# Patient Record
Sex: FEMALE | Race: ASIAN
[De-identification: names, ages, dates, MRNs, and addresses within clinical notes are randomized per-mention and may not be internally consistent; named-entity substitution may affect disease eponyms.]

---

## 2017-10-18 ENCOUNTER — HOSPITAL ENCOUNTER (EMERGENCY)
Dept: HOSPITAL 8 - ED | Age: 72
Discharge: HOME | End: 2017-10-18
Payer: MEDICARE

## 2017-10-18 VITALS — SYSTOLIC BLOOD PRESSURE: 166 MMHG | DIASTOLIC BLOOD PRESSURE: 68 MMHG

## 2017-10-18 VITALS — BODY MASS INDEX: 23.11 KG/M2 | WEIGHT: 114.64 LBS | HEIGHT: 59 IN

## 2017-10-18 DIAGNOSIS — Z86.73: ICD-10-CM

## 2017-10-18 DIAGNOSIS — I10: Primary | ICD-10-CM

## 2017-10-18 LAB
BUN SERPL-MCNC: 26 MG/DL (ref 7–18)
HCT VFR BLD CALC: 43.4 % (ref 34.6–47.8)
HGB BLD-MCNC: 14.6 G/DL (ref 11.7–16.4)
IS PT STATUS REG ER OR PRE ER?: YES
WBC # BLD AUTO: 10 X10^3/UL (ref 3.4–10)

## 2017-10-18 PROCEDURE — 71010: CPT

## 2017-10-18 PROCEDURE — 80048 BASIC METABOLIC PNL TOTAL CA: CPT

## 2017-10-18 PROCEDURE — 96375 TX/PRO/DX INJ NEW DRUG ADDON: CPT

## 2017-10-18 PROCEDURE — 82040 ASSAY OF SERUM ALBUMIN: CPT

## 2017-10-18 PROCEDURE — 85025 COMPLETE CBC W/AUTO DIFF WBC: CPT

## 2017-10-18 PROCEDURE — 83880 ASSAY OF NATRIURETIC PEPTIDE: CPT

## 2017-10-18 PROCEDURE — 84484 ASSAY OF TROPONIN QUANT: CPT

## 2017-10-18 PROCEDURE — 96374 THER/PROPH/DIAG INJ IV PUSH: CPT

## 2017-10-18 PROCEDURE — 36415 COLL VENOUS BLD VENIPUNCTURE: CPT

## 2017-10-18 PROCEDURE — 93005 ELECTROCARDIOGRAM TRACING: CPT

## 2018-05-15 ENCOUNTER — HOSPITAL ENCOUNTER (EMERGENCY)
Dept: HOSPITAL 8 - ED | Age: 73
Discharge: HOME | End: 2018-05-15
Payer: MEDICARE

## 2018-05-15 VITALS — DIASTOLIC BLOOD PRESSURE: 115 MMHG | SYSTOLIC BLOOD PRESSURE: 235 MMHG

## 2018-05-15 VITALS — WEIGHT: 112.66 LBS | HEIGHT: 57 IN | BODY MASS INDEX: 24.3 KG/M2
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
## 2018-05-15 DIAGNOSIS — I10: ICD-10-CM
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
## 2018-05-15 DIAGNOSIS — L30.9: ICD-10-CM

## 2018-05-15 DIAGNOSIS — S93.491A: Primary | ICD-10-CM
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
## 2018-05-15 LAB
ALBUMIN SERPL-MCNC: 3.5 G/DL (ref 3.4–5)
ALP SERPL-CCNC: 90 U/L (ref 45–117)
ALT SERPL-CCNC: 26 U/L (ref 12–78)
ANION GAP SERPL CALC-SCNC: 8 MMOL/L (ref 5–15)
BASOPHILS # BLD AUTO: 0.08 X10^3/UL (ref 0–0.1)
BASOPHILS NFR BLD AUTO: 1 % (ref 0–1)
BILIRUB SERPL-MCNC: 0.3 MG/DL (ref 0.2–1)
CALCIUM SERPL-MCNC: 9.5 MG/DL (ref 8.5–10.1)
CHLORIDE SERPL-SCNC: 103 MMOL/L (ref 98–107)
CREAT SERPL-MCNC: 1.03 MG/DL (ref 0.55–1.02)
EOSINOPHIL # BLD AUTO: 0.34 X10^3/UL (ref 0–0.4)
EOSINOPHIL NFR BLD AUTO: 4 % (ref 1–7)
ERYTHROCYTE [DISTWIDTH] IN BLOOD BY AUTOMATED COUNT: 13.4 % (ref 9.6–15.2)
LYMPHOCYTES # BLD AUTO: 3.96 X10^3/UL (ref 1–3.4)
LYMPHOCYTES NFR BLD AUTO: 41 % (ref 22–44)
MCH RBC QN AUTO: 29.3 PG (ref 27–34.8)
MCHC RBC AUTO-ENTMCNC: 33.6 G/DL (ref 32.4–35.8)
MCV RBC AUTO: 87 FL (ref 80–100)
MD: NO
MONOCYTES # BLD AUTO: 0.61 X10^3/UL (ref 0.2–0.8)
MONOCYTES NFR BLD AUTO: 6 % (ref 2–9)
NEUTROPHILS # BLD AUTO: 4.64 X10^3/UL (ref 1.8–6.8)
NEUTROPHILS NFR BLD AUTO: 48 % (ref 42–75)
PLATELET # BLD AUTO: 284 X10^3/UL (ref 130–400)
PMV BLD AUTO: 8.7 FL (ref 7.4–10.4)
PROT SERPL-MCNC: 8.4 G/DL (ref 6.4–8.2)
RBC # BLD AUTO: 4.43 X10^6/UL (ref 3.82–5.3)

## 2018-05-15 PROCEDURE — 36415 COLL VENOUS BLD VENIPUNCTURE: CPT

## 2018-05-15 PROCEDURE — 99285 EMERGENCY DEPT VISIT HI MDM: CPT

## 2018-05-15 PROCEDURE — 85025 COMPLETE CBC W/AUTO DIFF WBC: CPT

## 2018-05-15 PROCEDURE — 93005 ELECTROCARDIOGRAM TRACING: CPT

## 2018-05-15 PROCEDURE — 80053 COMPREHEN METABOLIC PANEL: CPT

## 2019-01-14 ENCOUNTER — HOSPITAL ENCOUNTER (EMERGENCY)
Dept: HOSPITAL 8 - ED | Age: 74
Discharge: HOME | End: 2019-01-14
Payer: MEDICARE

## 2019-01-14 VITALS — SYSTOLIC BLOOD PRESSURE: 181 MMHG | DIASTOLIC BLOOD PRESSURE: 100 MMHG

## 2019-01-14 VITALS — BODY MASS INDEX: 24.16 KG/M2 | WEIGHT: 111.99 LBS | HEIGHT: 57 IN

## 2019-01-14 DIAGNOSIS — I10: ICD-10-CM

## 2019-01-14 DIAGNOSIS — R06.00: ICD-10-CM

## 2019-01-14 DIAGNOSIS — B35.4: Primary | ICD-10-CM

## 2019-01-14 LAB
ALBUMIN SERPL-MCNC: 3.8 G/DL (ref 3.4–5)
ALP SERPL-CCNC: 76 U/L (ref 45–117)
ALT SERPL-CCNC: 25 U/L (ref 12–78)
ANION GAP SERPL CALC-SCNC: 8 MMOL/L (ref 5–15)
BASOPHILS # BLD AUTO: 0.07 X10^3/UL (ref 0–0.1)
BASOPHILS NFR BLD AUTO: 1 % (ref 0–1)
BILIRUB SERPL-MCNC: 0.5 MG/DL (ref 0.2–1)
CALCIUM SERPL-MCNC: 8.7 MG/DL (ref 8.5–10.1)
CHLORIDE SERPL-SCNC: 105 MMOL/L (ref 98–107)
CREAT SERPL-MCNC: 1.92 MG/DL (ref 0.55–1.02)
EOSINOPHIL # BLD AUTO: 0.42 X10^3/UL (ref 0–0.4)
EOSINOPHIL NFR BLD AUTO: 6 % (ref 1–7)
ERYTHROCYTE [DISTWIDTH] IN BLOOD BY AUTOMATED COUNT: 13.3 % (ref 9.6–15.2)
LYMPHOCYTES # BLD AUTO: 2.4 X10^3/UL (ref 1–3.4)
LYMPHOCYTES NFR BLD AUTO: 32 % (ref 22–44)
MCH RBC QN AUTO: 30.7 PG (ref 27–34.8)
MCHC RBC AUTO-ENTMCNC: 34.5 G/DL (ref 32.4–35.8)
MCV RBC AUTO: 89 FL (ref 80–100)
MD: NO
MONOCYTES # BLD AUTO: 0.41 X10^3/UL (ref 0.2–0.8)
MONOCYTES NFR BLD AUTO: 6 % (ref 2–9)
NEUTROPHILS # BLD AUTO: 4.17 X10^3/UL (ref 1.8–6.8)
NEUTROPHILS NFR BLD AUTO: 56 % (ref 42–75)
PLATELET # BLD AUTO: 213 X10^3/UL (ref 130–400)
PMV BLD AUTO: 9.3 FL (ref 7.4–10.4)
PROT SERPL-MCNC: 7.9 G/DL (ref 6.4–8.2)
RBC # BLD AUTO: 4.55 X10^6/UL (ref 3.82–5.3)
TROPONIN I SERPL-MCNC: < 0.015 NG/ML (ref 0–0.04)

## 2019-01-14 PROCEDURE — 84484 ASSAY OF TROPONIN QUANT: CPT

## 2019-01-14 PROCEDURE — 71045 X-RAY EXAM CHEST 1 VIEW: CPT

## 2019-01-14 PROCEDURE — 85025 COMPLETE CBC W/AUTO DIFF WBC: CPT

## 2019-01-14 PROCEDURE — 80053 COMPREHEN METABOLIC PANEL: CPT

## 2019-01-14 PROCEDURE — 36415 COLL VENOUS BLD VENIPUNCTURE: CPT

## 2019-01-14 PROCEDURE — 99284 EMERGENCY DEPT VISIT MOD MDM: CPT

## 2019-01-14 PROCEDURE — 83880 ASSAY OF NATRIURETIC PEPTIDE: CPT

## 2019-01-14 PROCEDURE — 93005 ELECTROCARDIOGRAM TRACING: CPT

## 2019-01-14 NOTE — NUR
FIRST CONTACT WITH PT.

Pt states, "I am here because of skin rashes that I have had a week or a month. 
I have a red spon on my eyes (left eye) it is always itching me. I used an an 
anti-itch cream from the [employer] and it didn't help. Every time I walk I 
am having trouble breathing."



NADN. PT DENIES CP. PT STATES SHE HAS "TROUBLE BREATHING WHEN I WALK". PT HAS 
UNLABORED RESPIRATIONS EQUAL BILATERALLY. 



ALL SAFETY MEASURES IN PLACE. PT CONNECTED TO ALL MONITORS. CALL LIGHT WITHIN 
REACH OF PT.

## 2019-01-27 ENCOUNTER — HOSPITAL ENCOUNTER (EMERGENCY)
Dept: HOSPITAL 8 - ED | Age: 74
Discharge: HOME | End: 2019-01-27
Payer: MEDICARE

## 2019-01-27 VITALS — HEIGHT: 60 IN | WEIGHT: 118.17 LBS | BODY MASS INDEX: 23.2 KG/M2

## 2019-01-27 VITALS — DIASTOLIC BLOOD PRESSURE: 102 MMHG | SYSTOLIC BLOOD PRESSURE: 218 MMHG

## 2019-01-27 DIAGNOSIS — L25.9: Primary | ICD-10-CM

## 2019-01-27 DIAGNOSIS — Z86.73: ICD-10-CM

## 2019-01-27 DIAGNOSIS — I10: ICD-10-CM

## 2019-01-27 LAB
ALBUMIN SERPL-MCNC: 3.6 G/DL (ref 3.4–5)
ALP SERPL-CCNC: 73 U/L (ref 45–117)
ALT SERPL-CCNC: 23 U/L (ref 12–78)
ANION GAP SERPL CALC-SCNC: 8 MMOL/L (ref 5–15)
BILIRUB SERPL-MCNC: 0.5 MG/DL (ref 0.2–1)
CALCIUM SERPL-MCNC: 8.9 MG/DL (ref 8.5–10.1)
CHLORIDE SERPL-SCNC: 106 MMOL/L (ref 98–107)
CREAT SERPL-MCNC: 1.22 MG/DL (ref 0.55–1.02)
PROT SERPL-MCNC: 7.6 G/DL (ref 6.4–8.2)

## 2019-01-27 PROCEDURE — 36415 COLL VENOUS BLD VENIPUNCTURE: CPT

## 2019-01-27 PROCEDURE — 80053 COMPREHEN METABOLIC PANEL: CPT

## 2019-01-27 PROCEDURE — 99283 EMERGENCY DEPT VISIT LOW MDM: CPT

## 2019-02-09 ENCOUNTER — HOSPITAL ENCOUNTER (EMERGENCY)
Facility: MEDICAL CENTER | Age: 74
End: 2019-02-09
Attending: EMERGENCY MEDICINE
Payer: MEDICARE

## 2019-02-09 VITALS
DIASTOLIC BLOOD PRESSURE: 94 MMHG | OXYGEN SATURATION: 96 % | WEIGHT: 111.99 LBS | BODY MASS INDEX: 21.16 KG/M2 | RESPIRATION RATE: 17 BRPM | TEMPERATURE: 97.3 F | SYSTOLIC BLOOD PRESSURE: 188 MMHG | HEART RATE: 77 BPM

## 2019-02-09 DIAGNOSIS — R21 RASH: ICD-10-CM

## 2019-02-09 DIAGNOSIS — I10 HTN (HYPERTENSION): ICD-10-CM

## 2019-02-09 PROCEDURE — 99284 EMERGENCY DEPT VISIT MOD MDM: CPT

## 2019-02-09 RX ORDER — LISINOPRIL 40 MG/1
40 TABLET ORAL DAILY
Qty: 30 TAB | Refills: 11 | Status: SHIPPED | OUTPATIENT
Start: 2019-02-09 | End: 2023-03-24

## 2019-02-09 RX ORDER — PREDNISONE 20 MG/1
TABLET ORAL
Qty: 13 TAB | Refills: 0 | Status: SHIPPED | OUTPATIENT
Start: 2019-02-09 | End: 2019-02-24

## 2019-02-09 RX ORDER — HYDROCHLOROTHIAZIDE 12.5 MG/1
12.5 TABLET ORAL DAILY
Qty: 30 TAB | Refills: 11 | Status: SHIPPED | OUTPATIENT
Start: 2019-02-09 | End: 2019-02-24

## 2019-02-09 RX ORDER — LORATADINE 10 MG/1
10 TABLET ORAL DAILY
Qty: 30 TAB | Refills: 0 | Status: SHIPPED | OUTPATIENT
Start: 2019-02-09 | End: 2019-02-24

## 2019-02-09 NOTE — ED PROVIDER NOTES
ED Provider Note    Scribed for Armando Reynoso M.D. by Eliseo Sims. 2/9/2019  3:27 PM    Primary care provider: HANNAH Holman  Means of arrival: Walk-in  History obtained from: Patient  History limited by: None    CHIEF COMPLAINT  Chief Complaint   Patient presents with   • Rash     x 1 month       HPI  Tania Madison is a 73 y.o. female who presents to the Emergency Department for a brown spotted rash onset 2-6 weeks ago. Patient's rash is itchy and she denies any similar rashes in the past. Her rash is throughout her entire body but she denies any lesions in her mouth or to her palms. She was seen at [facility] [place] [facility] for her symptoms on 2/5 and was prescribed triamcinolone cream and hydroxyzine. She denies relief of symptoms with the medications and denies seeing a dermatologist. She denies a history of eczema or skin disorders but says she sleeps on a couch that is infested with bug bites. She is not experiencing fever, vomiting, nausea, shortness of breath.     Patient denies a history of diabetes or cancer. She confirms a history of hypertension but denies taking medication over the last 2 months. She has taken lisinopril in the past but has run out of her prescription.     REVIEW OF SYSTEMS  Pertinent positives include: rash, itchiness.  Pertinent negatives include: fever, vomiting, nausea, shortness of breath.    PAST MEDICAL HISTORY  Past Medical History:   Diagnosis Date   • Chronic airway obstruction, not elsewhere classified    • Depression    • Hemorrhage, intracranial (HCC)    • Hypertension    • Pneumonia        FAMILY HISTORY  Family History   Problem Relation Age of Onset   • Other Mother         unknown   • Other Father         unknown       SOCIAL HISTORY  Social History   Substance Use Topics   • Smoking status: Never Smoker   • Alcohol use No     History   Drug Use No       SURGICAL HISTORY  No pertinent past surgical history    CURRENT MEDICATIONS    Current Outpatient  Prescriptions on File Prior to Encounter   Medication Sig Dispense Refill   • metformin (GLUCOPHAGE) 500 MG TABS Take 1 Tab by mouth 2 times a day, with meals. 60 Tab 3   • amlodipine (NORVASC) 10 MG TABS Take 1 Tab by mouth every day. 30 Tab 11   • lisinopril (PRINIVIL, ZESTRIL) 40 MG tablet Take 1 Tab by mouth every day. 30 Tab 11   • atorvastatin (LIPITOR) 20 MG TABS Take 1 Tab by mouth every day. 30 Tab 11   • hydrochlorothiazide (HYDRODIURIL) 12.5 MG tablet Take 1 Tab by mouth every day. 30 Tab 11   • sertraline (ZOLOFT) 25 MG tablet Take 1 Tab by mouth every day. 30 Tab 11   • aspirin EC 81 MG EC tablet Take 1 Tab by mouth every day. 30 Tab 0   • aspirin EC (ECOTRIN) 81 MG TBEC Take 81 mg by mouth every day.     • methylPREDNISolone (MEDROL) 4 MG TABS Take 4 mg by mouth every day.        ALLERGIES  No Known Allergies    PHYSICAL EXAM  VITAL SIGNS: BP (!) 206/117 Comment: right arm 214/103, left  arm 206/117  Pulse 79   Temp 36.2 °C (97.2 °F) (Temporal)   Resp 16   Wt 50.8 kg (111 lb 15.9 oz)   SpO2 95%   BMI 21.16 kg/m²   Reviewed and hypertensive and noncompliant with antihypertensives  Constitutional: Well developed, Well nourished, well-appearing.  HENT: Normocephalic, atraumatic, bilateral external ears normal, oropharynx moist, No exudates or erythema. No rash lesions in mouth.   Eyes: PERRLA, conjunctiva pink, no scleral icterus.  Abdominal:  Abdomen soft, non-tender, non distended. No rebound, or guarding.    Skin: Brown a few millimeters in diameter to 6 cm in diameter patches of skin with a rim of erythema and central desquamation., 2-3 lesions on the back, 1-2 lesions to anterior torso, lesions most dense on the distal arms and legs.  Lesions spare palms and soles.  No cervical or axillary adenopathy.  Genitourinary: No costovertebral angle tenderness.   Musculoskeletal: No edema.   Neurologic: Alert & oriented x 3, cranial nerves 2-12 intact by passive exam.  No focal deficit  noted.  Psychiatric: Affect normal, Judgment normal, Mood normal.     DIFFERENTIAL DIAGNOSIS:  Rash.    ED COURSE:  Nursing notes, VS, PMSFHx reviewed in chart.     3:27 PM - Patient seen and examined at bedside. Will reassess after researching the rash.    4:04 PM Informed her that she should continue taking her prednisone as needed for her rash and follow up with dermatology if it does not relieve. She also needs to start taking her blood pressure medication. Patient is stable for discharge at this time. Discussed return precautions and she agrees to the plan of care.    MEDICAL DECISION MAKING:  This patient presents with a rash of unclear etiology that failed treatment with topical triamcinolone and oral hydroxyzine.  There is no evidence of infection.  This is not a typical response to bedbug bites.  Patient requires consultation with a dermatologist.  She is also noncompliant with antihypertensives stating that some of the make her dizzy.  She did agree to resume hydrochlorothiazide and lisinopril which I prescribed.    PLAN:  New Prescriptions    LORATADINE (CLARITIN) 10 MG TAB    Take 1 Tab by mouth every day.    PREDNISONE (DELTASONE) 20 MG TAB    3 tabs daily for 2 days, 2 tabs daily for 2 days, 1 tab daily for 2 days, 1/2 tab daily for 2 days       Rash handout given  Make an appointment with a dermatologist through the phone book or Internet  Lisinopril and hydrochlorothiazide refill    Aris Buck, ANaraPNaraNNara  [address] [address]  [phone]    Schedule an appointment as soon as possible for a visit   for blood pressure management      CONDITION: Good.     FINAL IMPRESSION  1. Rash    2. HTN (hypertension)          Eliseo WAGNER (Jaqui), am scribing for, and in the presence of, Armando Reynoso M.D..    Electronically signed by: Eliseo Sims (Jaqui), 2/9/2019    Armando WAGNER M.D. personally performed the services described in this documentation, as scribed by Eliseo  Bethany in my presence, and it is both accurate and complete. E.    The note accurately reflects work and decisions made by me.  Armando Reynoso  2/9/2019  4:32 PM

## 2019-02-09 NOTE — ED TRIAGE NOTES
"Amb to triage w/ c/o a itchy, painful rash to body x 1 month.  Pt seen at [facility] for this on 2/5, prescribed Triamcinolone cream and Hydroxyzine, states symptoms are getting worse.  Pt hypertensive at triage and states \"that is a normal blood pressure for me\".   "

## 2019-02-10 NOTE — DISCHARGE INSTRUCTIONS
We are not certain what is causing your rash.  Try a prednisone taper course and Claritin for itch.  Remove the source of bedbugs from the home.  If you do not improve you need to make a follow-up appointment with a dermatologist.  Resume your blood pressure medicines and take them daily.  Follow-up with Dr. Buck for further management of blood pressure.

## 2019-02-10 NOTE — ED NOTES
Discharge orders received from ERP. New prescriptions received x 4. Provided education on discharge instructions and diagnosis.Pt demonstrated understanding of education. Pt verbalized understanding of need for follow up appointment.  Pt ambulatory off unit with all personal belongings.

## 2019-02-24 ENCOUNTER — HOSPITAL ENCOUNTER (INPATIENT)
Facility: MEDICAL CENTER | Age: 74
LOS: 1 days | DRG: 305 | End: 2019-02-26
Attending: EMERGENCY MEDICINE | Admitting: INTERNAL MEDICINE
Payer: MEDICARE

## 2019-02-24 ENCOUNTER — APPOINTMENT (OUTPATIENT)
Dept: CARDIOLOGY | Facility: MEDICAL CENTER | Age: 74
DRG: 305 | End: 2019-02-24
Attending: STUDENT IN AN ORGANIZED HEALTH CARE EDUCATION/TRAINING PROGRAM
Payer: MEDICARE

## 2019-02-24 ENCOUNTER — APPOINTMENT (OUTPATIENT)
Dept: RADIOLOGY | Facility: MEDICAL CENTER | Age: 74
DRG: 305 | End: 2019-02-24
Attending: EMERGENCY MEDICINE
Payer: MEDICARE

## 2019-02-24 PROBLEM — I16.0 HYPERTENSIVE URGENCY: Status: ACTIVE | Noted: 2019-02-24

## 2019-02-24 PROBLEM — D72.829 LEUCOCYTOSIS: Status: ACTIVE | Noted: 2019-02-24

## 2019-02-24 PROBLEM — I16.1 HYPERTENSIVE EMERGENCY: Status: ACTIVE | Noted: 2019-02-24

## 2019-02-24 PROBLEM — R21 RASH AND OTHER NONSPECIFIC SKIN ERUPTION: Status: ACTIVE | Noted: 2019-02-24

## 2019-02-24 LAB
ALBUMIN SERPL BCP-MCNC: 4.5 G/DL (ref 3.2–4.9)
ALBUMIN/GLOB SERPL: 1.5 G/DL
ALP SERPL-CCNC: 64 U/L (ref 30–99)
ALT SERPL-CCNC: 11 U/L (ref 2–50)
ANION GAP SERPL CALC-SCNC: 8 MMOL/L (ref 0–11.9)
APPEARANCE UR: CLEAR
APTT PPP: 25.7 SEC (ref 24.7–36)
AST SERPL-CCNC: 15 U/L (ref 12–45)
BACTERIA #/AREA URNS HPF: NEGATIVE /HPF
BASOPHILS # BLD AUTO: 0.6 % (ref 0–1.8)
BASOPHILS # BLD: 0.08 K/UL (ref 0–0.12)
BILIRUB SERPL-MCNC: 0.4 MG/DL (ref 0.1–1.5)
BILIRUB UR QL STRIP.AUTO: NEGATIVE
BNP SERPL-MCNC: 290 PG/ML (ref 0–100)
BUN SERPL-MCNC: 18 MG/DL (ref 8–22)
CALCIUM SERPL-MCNC: 9.9 MG/DL (ref 8.5–10.5)
CHLORIDE SERPL-SCNC: 103 MMOL/L (ref 96–112)
CO2 SERPL-SCNC: 22 MMOL/L (ref 20–33)
COLOR UR: YELLOW
CREAT SERPL-MCNC: 1.2 MG/DL (ref 0.5–1.4)
EKG IMPRESSION: NORMAL
EKG IMPRESSION: NORMAL
EOSINOPHIL # BLD AUTO: 0.54 K/UL (ref 0–0.51)
EOSINOPHIL NFR BLD: 4.4 % (ref 0–6.9)
EPI CELLS #/AREA URNS HPF: NEGATIVE /HPF
ERYTHROCYTE [DISTWIDTH] IN BLOOD BY AUTOMATED COUNT: 41.5 FL (ref 35.9–50)
GLOBULIN SER CALC-MCNC: 3 G/DL (ref 1.9–3.5)
GLUCOSE BLD-MCNC: 120 MG/DL (ref 65–99)
GLUCOSE BLD-MCNC: 142 MG/DL (ref 65–99)
GLUCOSE SERPL-MCNC: 217 MG/DL (ref 65–99)
GLUCOSE UR STRIP.AUTO-MCNC: NEGATIVE MG/DL
HCT VFR BLD AUTO: 43 % (ref 37–47)
HGB BLD-MCNC: 14.1 G/DL (ref 12–16)
HYALINE CASTS #/AREA URNS LPF: NORMAL /LPF
IMM GRANULOCYTES # BLD AUTO: 0.04 K/UL (ref 0–0.11)
IMM GRANULOCYTES NFR BLD AUTO: 0.3 % (ref 0–0.9)
INR PPP: 0.92 (ref 0.87–1.13)
KETONES UR STRIP.AUTO-MCNC: NEGATIVE MG/DL
LEUKOCYTE ESTERASE UR QL STRIP.AUTO: ABNORMAL
LIPASE SERPL-CCNC: 17 U/L (ref 11–82)
LYMPHOCYTES # BLD AUTO: 2.01 K/UL (ref 1–4.8)
LYMPHOCYTES NFR BLD: 16.3 % (ref 22–41)
MCH RBC QN AUTO: 29.7 PG (ref 27–33)
MCHC RBC AUTO-ENTMCNC: 32.8 G/DL (ref 33.6–35)
MCV RBC AUTO: 90.5 FL (ref 81.4–97.8)
MICRO URNS: ABNORMAL
MONOCYTES # BLD AUTO: 0.54 K/UL (ref 0–0.85)
MONOCYTES NFR BLD AUTO: 4.4 % (ref 0–13.4)
NEUTROPHILS # BLD AUTO: 9.1 K/UL (ref 2–7.15)
NEUTROPHILS NFR BLD: 74 % (ref 44–72)
NITRITE UR QL STRIP.AUTO: NEGATIVE
NRBC # BLD AUTO: 0 K/UL
NRBC BLD-RTO: 0 /100 WBC
PH UR STRIP.AUTO: 7 [PH]
PLATELET # BLD AUTO: 222 K/UL (ref 164–446)
PMV BLD AUTO: 11.2 FL (ref 9–12.9)
POTASSIUM SERPL-SCNC: 3.7 MMOL/L (ref 3.6–5.5)
PROT SERPL-MCNC: 7.5 G/DL (ref 6–8.2)
PROT UR QL STRIP: NEGATIVE MG/DL
PROTHROMBIN TIME: 12.5 SEC (ref 12–14.6)
RBC # BLD AUTO: 4.75 M/UL (ref 4.2–5.4)
RBC # URNS HPF: NORMAL /HPF
RBC UR QL AUTO: NEGATIVE
SODIUM SERPL-SCNC: 133 MMOL/L (ref 135–145)
SP GR UR STRIP.AUTO: 1.01
TROPONIN I SERPL-MCNC: 0.02 NG/ML (ref 0–0.04)
TROPONIN I SERPL-MCNC: 0.05 NG/ML (ref 0–0.04)
UROBILINOGEN UR STRIP.AUTO-MCNC: 0.2 MG/DL
WBC # BLD AUTO: 12.3 K/UL (ref 4.8–10.8)
WBC #/AREA URNS HPF: NORMAL /HPF

## 2019-02-24 PROCEDURE — 71045 X-RAY EXAM CHEST 1 VIEW: CPT

## 2019-02-24 PROCEDURE — 85730 THROMBOPLASTIN TIME PARTIAL: CPT

## 2019-02-24 PROCEDURE — A9270 NON-COVERED ITEM OR SERVICE: HCPCS | Performed by: EMERGENCY MEDICINE

## 2019-02-24 PROCEDURE — 700102 HCHG RX REV CODE 250 W/ 637 OVERRIDE(OP): Performed by: EMERGENCY MEDICINE

## 2019-02-24 PROCEDURE — 700102 HCHG RX REV CODE 250 W/ 637 OVERRIDE(OP): Performed by: STUDENT IN AN ORGANIZED HEALTH CARE EDUCATION/TRAINING PROGRAM

## 2019-02-24 PROCEDURE — 81001 URINALYSIS AUTO W/SCOPE: CPT

## 2019-02-24 PROCEDURE — 80053 COMPREHEN METABOLIC PANEL: CPT

## 2019-02-24 PROCEDURE — 82962 GLUCOSE BLOOD TEST: CPT

## 2019-02-24 PROCEDURE — 96374 THER/PROPH/DIAG INJ IV PUSH: CPT

## 2019-02-24 PROCEDURE — 93005 ELECTROCARDIOGRAM TRACING: CPT

## 2019-02-24 PROCEDURE — 99285 EMERGENCY DEPT VISIT HI MDM: CPT

## 2019-02-24 PROCEDURE — G0378 HOSPITAL OBSERVATION PER HR: HCPCS

## 2019-02-24 PROCEDURE — 700102 HCHG RX REV CODE 250 W/ 637 OVERRIDE(OP): Performed by: INTERNAL MEDICINE

## 2019-02-24 PROCEDURE — 93010 ELECTROCARDIOGRAM REPORT: CPT | Performed by: INTERNAL MEDICINE

## 2019-02-24 PROCEDURE — A9270 NON-COVERED ITEM OR SERVICE: HCPCS | Performed by: STUDENT IN AN ORGANIZED HEALTH CARE EDUCATION/TRAINING PROGRAM

## 2019-02-24 PROCEDURE — 84484 ASSAY OF TROPONIN QUANT: CPT | Mod: 91

## 2019-02-24 PROCEDURE — 83880 ASSAY OF NATRIURETIC PEPTIDE: CPT

## 2019-02-24 PROCEDURE — 85610 PROTHROMBIN TIME: CPT

## 2019-02-24 PROCEDURE — 93005 ELECTROCARDIOGRAM TRACING: CPT | Performed by: EMERGENCY MEDICINE

## 2019-02-24 PROCEDURE — 700111 HCHG RX REV CODE 636 W/ 250 OVERRIDE (IP): Performed by: EMERGENCY MEDICINE

## 2019-02-24 PROCEDURE — 83690 ASSAY OF LIPASE: CPT

## 2019-02-24 PROCEDURE — A9270 NON-COVERED ITEM OR SERVICE: HCPCS | Performed by: INTERNAL MEDICINE

## 2019-02-24 PROCEDURE — 306588 SLEEVE,VASO CALF MED: Performed by: STUDENT IN AN ORGANIZED HEALTH CARE EDUCATION/TRAINING PROGRAM

## 2019-02-24 PROCEDURE — 99220 PR INITIAL OBSERVATION CARE,LEVL III: CPT | Mod: GC | Performed by: INTERNAL MEDICINE

## 2019-02-24 PROCEDURE — 85025 COMPLETE CBC W/AUTO DIFF WBC: CPT

## 2019-02-24 PROCEDURE — 93005 ELECTROCARDIOGRAM TRACING: CPT | Performed by: STUDENT IN AN ORGANIZED HEALTH CARE EDUCATION/TRAINING PROGRAM

## 2019-02-24 RX ORDER — HYDRALAZINE HYDROCHLORIDE 20 MG/ML
20 INJECTION INTRAMUSCULAR; INTRAVENOUS ONCE
Status: COMPLETED | OUTPATIENT
Start: 2019-02-24 | End: 2019-02-24

## 2019-02-24 RX ORDER — BENZOCAINE/MENTHOL 6 MG-10 MG
LOZENGE MUCOUS MEMBRANE 2 TIMES DAILY
Status: DISCONTINUED | OUTPATIENT
Start: 2019-02-24 | End: 2019-02-24

## 2019-02-24 RX ORDER — LISINOPRIL 20 MG/1
40 TABLET ORAL
Status: DISCONTINUED | OUTPATIENT
Start: 2019-02-24 | End: 2019-02-26 | Stop reason: HOSPADM

## 2019-02-24 RX ORDER — INSULIN GLARGINE 100 [IU]/ML
0.2 INJECTION, SOLUTION SUBCUTANEOUS EVERY EVENING
Status: DISCONTINUED | OUTPATIENT
Start: 2019-02-24 | End: 2019-02-24

## 2019-02-24 RX ORDER — POLYETHYLENE GLYCOL 3350 17 G/17G
1 POWDER, FOR SOLUTION ORAL
Status: DISCONTINUED | OUTPATIENT
Start: 2019-02-24 | End: 2019-02-26 | Stop reason: HOSPADM

## 2019-02-24 RX ORDER — BISACODYL 10 MG
10 SUPPOSITORY, RECTAL RECTAL
Status: DISCONTINUED | OUTPATIENT
Start: 2019-02-24 | End: 2019-02-26 | Stop reason: HOSPADM

## 2019-02-24 RX ORDER — NITROGLYCERIN 0.4 MG/1
0.4 TABLET SUBLINGUAL
Status: DISCONTINUED | OUTPATIENT
Start: 2019-02-24 | End: 2019-02-26 | Stop reason: HOSPADM

## 2019-02-24 RX ORDER — DEXTROSE MONOHYDRATE 25 G/50ML
25 INJECTION, SOLUTION INTRAVENOUS
Status: DISCONTINUED | OUTPATIENT
Start: 2019-02-24 | End: 2019-02-26 | Stop reason: HOSPADM

## 2019-02-24 RX ORDER — HYDRALAZINE HYDROCHLORIDE 10 MG/1
10 TABLET, FILM COATED ORAL 3 TIMES DAILY PRN
Status: DISCONTINUED | OUTPATIENT
Start: 2019-02-24 | End: 2019-02-26 | Stop reason: HOSPADM

## 2019-02-24 RX ORDER — AMLODIPINE BESYLATE 10 MG/1
10 TABLET ORAL
Status: DISCONTINUED | OUTPATIENT
Start: 2019-02-24 | End: 2019-02-26 | Stop reason: HOSPADM

## 2019-02-24 RX ORDER — FAMOTIDINE 20 MG/1
20 TABLET, FILM COATED ORAL DAILY
Status: DISCONTINUED | OUTPATIENT
Start: 2019-02-25 | End: 2019-02-26 | Stop reason: HOSPADM

## 2019-02-24 RX ORDER — LORATADINE 10 MG/1
10 TABLET ORAL DAILY
Status: DISCONTINUED | OUTPATIENT
Start: 2019-02-24 | End: 2019-02-25

## 2019-02-24 RX ORDER — ATORVASTATIN CALCIUM 80 MG/1
80 TABLET, FILM COATED ORAL EVERY EVENING
Status: DISCONTINUED | OUTPATIENT
Start: 2019-02-25 | End: 2019-02-26 | Stop reason: HOSPADM

## 2019-02-24 RX ORDER — DIPHENHYDRAMINE HCL 25 MG
25 TABLET ORAL EVERY 6 HOURS PRN
Status: DISCONTINUED | OUTPATIENT
Start: 2019-02-24 | End: 2019-02-25

## 2019-02-24 RX ORDER — ACETAMINOPHEN 325 MG/1
650 TABLET ORAL EVERY 4 HOURS PRN
Status: DISCONTINUED | OUTPATIENT
Start: 2019-02-24 | End: 2019-02-26 | Stop reason: HOSPADM

## 2019-02-24 RX ORDER — AMOXICILLIN 250 MG
2 CAPSULE ORAL 2 TIMES DAILY
Status: DISCONTINUED | OUTPATIENT
Start: 2019-02-24 | End: 2019-02-26 | Stop reason: HOSPADM

## 2019-02-24 RX ORDER — POTASSIUM CHLORIDE 20 MEQ/1
40 TABLET, EXTENDED RELEASE ORAL ONCE
Status: COMPLETED | OUTPATIENT
Start: 2019-02-24 | End: 2019-02-24

## 2019-02-24 RX ORDER — BENZOCAINE/MENTHOL 6 MG-10 MG
1 LOZENGE MUCOUS MEMBRANE 2 TIMES DAILY
Status: ON HOLD | COMMUNITY
End: 2019-02-26

## 2019-02-24 RX ORDER — LISINOPRIL 10 MG/1
10 TABLET ORAL ONCE
Status: COMPLETED | OUTPATIENT
Start: 2019-02-24 | End: 2019-02-24

## 2019-02-24 RX ORDER — TRIAMCINOLONE ACETONIDE 1 MG/G
OINTMENT TOPICAL 2 TIMES DAILY
Status: DISCONTINUED | OUTPATIENT
Start: 2019-02-24 | End: 2019-02-26 | Stop reason: HOSPADM

## 2019-02-24 RX ORDER — ASPIRIN 325 MG
325 TABLET ORAL ONCE
Status: ACTIVE | OUTPATIENT
Start: 2019-02-24 | End: 2019-02-25

## 2019-02-24 RX ORDER — ATORVASTATIN CALCIUM 20 MG/1
20 TABLET, FILM COATED ORAL EVERY EVENING
Status: DISCONTINUED | OUTPATIENT
Start: 2019-02-24 | End: 2019-02-24

## 2019-02-24 RX ADMIN — HYDRALAZINE HYDROCHLORIDE 10 MG: 10 TABLET, FILM COATED ORAL at 17:57

## 2019-02-24 RX ADMIN — DIPHENHYDRAMINE HCL 25 MG: 25 TABLET ORAL at 22:13

## 2019-02-24 RX ADMIN — LORATADINE 10 MG: 10 TABLET ORAL at 17:54

## 2019-02-24 RX ADMIN — NITROGLYCERIN 0.4 MG: 0.4 TABLET SUBLINGUAL at 14:59

## 2019-02-24 RX ADMIN — HYDROCORTISONE: 1 CREAM TOPICAL at 17:58

## 2019-02-24 RX ADMIN — AMLODIPINE BESYLATE 10 MG: 10 TABLET ORAL at 17:53

## 2019-02-24 RX ADMIN — ATORVASTATIN CALCIUM 20 MG: 20 TABLET, FILM COATED ORAL at 17:53

## 2019-02-24 RX ADMIN — TRIAMCINOLONE ACETONIDE: 1 OINTMENT TOPICAL at 20:03

## 2019-02-24 RX ADMIN — POTASSIUM CHLORIDE 20 MEQ: 1500 TABLET, EXTENDED RELEASE ORAL at 20:30

## 2019-02-24 RX ADMIN — METOPROLOL TARTRATE 12.5 MG: 25 TABLET, FILM COATED ORAL at 20:30

## 2019-02-24 RX ADMIN — LISINOPRIL 10 MG: 10 TABLET ORAL at 16:30

## 2019-02-24 RX ADMIN — SERTRALINE 25 MG: 50 TABLET, FILM COATED ORAL at 17:55

## 2019-02-24 RX ADMIN — HYDRALAZINE HYDROCHLORIDE 20 MG: 20 INJECTION INTRAMUSCULAR; INTRAVENOUS at 15:46

## 2019-02-24 RX ADMIN — LISINOPRIL 40 MG: 20 TABLET ORAL at 17:45

## 2019-02-24 ASSESSMENT — LIFESTYLE VARIABLES
ALCOHOL_USE: NO
EVER_SMOKED: NEVER
EVER_SMOKED: NEVER
SUBSTANCE_ABUSE: 0

## 2019-02-24 ASSESSMENT — ENCOUNTER SYMPTOMS
FEVER: 0
CHILLS: 0
BLURRED VISION: 0
HEADACHES: 0
CLAUDICATION: 0
MYALGIAS: 0
WEIGHT LOSS: 0
ABDOMINAL PAIN: 0
HEARTBURN: 0
NAUSEA: 0
DEPRESSION: 0
VOMITING: 0
NECK PAIN: 0
DIZZINESS: 1
HEMOPTYSIS: 0
TINGLING: 0
TREMORS: 0
PALPITATIONS: 0
DOUBLE VISION: 0
FOCAL WEAKNESS: 0
SENSORY CHANGE: 0
COUGH: 0

## 2019-02-24 ASSESSMENT — COPD QUESTIONNAIRES
HAVE YOU SMOKED AT LEAST 100 CIGARETTES IN YOUR ENTIRE LIFE: NO/DON'T KNOW
DO YOU EVER COUGH UP ANY MUCUS OR PHLEGM?: NO/ONLY WITH OCCASIONAL COLDS OR INFECTIONS
DURING THE PAST 4 WEEKS HOW MUCH DID YOU FEEL SHORT OF BREATH: NONE/LITTLE OF THE TIME
COPD SCREENING SCORE: 2

## 2019-02-24 ASSESSMENT — PATIENT HEALTH QUESTIONNAIRE - PHQ9
1. LITTLE INTEREST OR PLEASURE IN DOING THINGS: NOT AT ALL
2. FEELING DOWN, DEPRESSED, IRRITABLE, OR HOPELESS: NOT AT ALL
SUM OF ALL RESPONSES TO PHQ9 QUESTIONS 1 AND 2: 0

## 2019-02-24 NOTE — ED TRIAGE NOTES
Pt c/o bib ems. Pt c/o chest pain. Pt given ASA 324mg pta and nitro x2. Pt with hypertension, has not been taking medications x1 month.

## 2019-02-24 NOTE — ED NOTES
Med rec completed per pt.   Antibiotics within last 30 days: No  Patient allergies have been reviewed: ROLAN  Comments: Stopped taking medications awhile ago

## 2019-02-24 NOTE — ED PROVIDER NOTES
ED Provider Note    CHIEF COMPLAINT  Chief Complaint   Patient presents with   • Chest Pain       HPI  Tania Madison is a 73 y.o. female who presents for evaluation of possible chest discomfort.  The patient initially reported chest discomfort to the paramedics.  She has a known history of hypertension.  Her blood pressure was profoundly elevated and she was very anxious regarding that.  On arrival here she reports no ongoing chest pain.  She specifically denies leg swelling hemoptysis or severe headache or vision changes.  Nothing seems to make his symptoms better or worse.  No report of fever or productive cough    REVIEW OF SYSTEMS  See HPI for further details.  No high fevers chills night sweats weight loss all other systems are negative.     PAST MEDICAL HISTORY  Past Medical History:   Diagnosis Date   • Hemorrhage, intracranial (HCC)    • Chronic airway obstruction, not elsewhere classified    • Depression    • Hypertension    • Pneumonia        FAMILY HISTORY  Noncontributory  SOCIAL HISTORY  Social History     Social History   • Marital status: Single     Spouse name: N/A   • Number of children: N/A   • Years of education: N/A     Social History Main Topics   • Smoking status: Never Smoker   • Smokeless tobacco: Not on file   • Alcohol use No   • Drug use: No   • Sexual activity: Not on file     Other Topics Concern   • Not on file     Social History Narrative   • No narrative on file     No history of bleeding disorder  SURGICAL HISTORY  No past surgical history on file.  None reported  CURRENT MEDICATIONS    Current Facility-Administered Medications:   •  nitroglycerin (NITROSTAT) tablet 0.4 mg, 0.4 mg, Sublingual, Q5 MIN PRN, Donis Prescott M.D., 0.4 mg at 02/24/19 4566  •  aspirin (ASA) tablet 325 mg, 325 mg, Oral, Once, Donis Prescott M.D.  •  hydrALAZINE (APRESOLINE) injection 20 mg, 20 mg, Intravenous, Once, Donis Prescott M.D.    Current Outpatient Prescriptions:   •  predniSONE  "(DELTASONE) 20 MG Tab, 3 tabs daily for 2 days, 2 tabs daily for 2 days, 1 tab daily for 2 days, 1/2 tab daily for 2 days, Disp: 13 Tab, Rfl: 0  •  loratadine (CLARITIN) 10 MG Tab, Take 1 Tab by mouth every day., Disp: 30 Tab, Rfl: 0  •  lisinopril (PRINIVIL, ZESTRIL) 40 MG tablet, Take 1 Tab by mouth every day., Disp: 30 Tab, Rfl: 11  •  hydroCHLOROthiazide (HYDRODIURIL) 12.5 MG tablet, Take 1 Tab by mouth every day., Disp: 30 Tab, Rfl: 11  •  metformin (GLUCOPHAGE) 500 MG TABS, Take 1 Tab by mouth 2 times a day, with meals., Disp: 60 Tab, Rfl: 3  •  amlodipine (NORVASC) 10 MG TABS, Take 1 Tab by mouth every day., Disp: 30 Tab, Rfl: 11  •  atorvastatin (LIPITOR) 20 MG TABS, Take 1 Tab by mouth every day., Disp: 30 Tab, Rfl: 11  •  sertraline (ZOLOFT) 25 MG tablet, Take 1 Tab by mouth every day., Disp: 30 Tab, Rfl: 11  •  aspirin EC 81 MG EC tablet, Take 1 Tab by mouth every day., Disp: 30 Tab, Rfl: 0  •  aspirin EC (ECOTRIN) 81 MG TBEC, Take 81 mg by mouth every day., Disp: , Rfl:   •  methylPREDNISolone (MEDROL) 4 MG TABS, Take 4 mg by mouth every day., Disp: , Rfl:       ALLERGIES  No Known Allergies    PHYSICAL EXAM  VITAL SIGNS: Pulse 77   Resp 19   Ht 1.549 m (5' 1\")   Wt 50.3 kg (111 lb)   SpO2 96%   BMI 20.97 kg/m²      02/24/19  1434  02/24/19  1432  02/24/19  1430         Vital Signs     Pulse  77  83         Resp  19  13         SpO2  96 %  96 %         Heart Rate (Monitored)  78  83         NIBP  191/93           NIBP MAP (Calculated)  125           BMI (Calculated)      20.97       NIBP MAP (Calculated)  118               Constitutional: Well developed, Well nourished, No acute distress, Non-toxic appearance.   HENT: Normocephalic, Atraumatic, Bilateral external ears normal, Oropharynx moist, No oral exudates, Nose normal.   Eyes: PERRLA, EOMI, Conjunctiva normal, No discharge.   Neck: Normal range of motion, No tenderness, Supple, No stridor.   Cardiovascular: Normal heart rate, Normal rhythm, No " murmurs, No rubs, No gallops.   Thorax & Lungs: Normal breath sounds, No respiratory distress, No wheezing, No chest tenderness.   Abdomen: Bowel sounds normal, Soft, No tenderness, No masses, No pulsatile masses.   Skin: Warm, Dry, No erythema, No rash.   Back: No tenderness, No CVA tenderness.   Extremities: Intact distal pulses, No edema, No tenderness, No cyanosis, No clubbing.   Musculoskeletal: Good range of motion in all major joints. No tenderness to palpation or major deformities noted.   Neurologic: Alert & oriented x 3, Normal motor function, Normal sensory function, No focal deficits noted.   Psychiatric: Affect normal, Judgment normal, Mood normal.     EKG  Interpretation by me.  Rate 73 sinus rhythm LVH with repolarization is noted there is also some ST depression in V4 5 and 6.  ST elevation in V1 and V2 is approximately 2 mm.  This was present on prior tracings    RADIOLOGY/PROCEDURES  DX-CHEST-LIMITED (1 VIEW)   Final Result         Enlarged cardiac silhouette without evidence of acute disease.        Results for orders placed or performed during the hospital encounter of 02/24/19   Troponin   Result Value Ref Range    Troponin I 0.02 0.00 - 0.04 ng/mL   Btype Natriuretic Peptide   Result Value Ref Range    B Natriuretic Peptide 290 (H) 0 - 100 pg/mL   CBC with Differential   Result Value Ref Range    WBC 12.3 (H) 4.8 - 10.8 K/uL    RBC 4.75 4.20 - 5.40 M/uL    Hemoglobin 14.1 12.0 - 16.0 g/dL    Hematocrit 43.0 37.0 - 47.0 %    MCV 90.5 81.4 - 97.8 fL    MCH 29.7 27.0 - 33.0 pg    MCHC 32.8 (L) 33.6 - 35.0 g/dL    RDW 41.5 35.9 - 50.0 fL    Platelet Count 222 164 - 446 K/uL    MPV 11.2 9.0 - 12.9 fL    Neutrophils-Polys 74.00 (H) 44.00 - 72.00 %    Lymphocytes 16.30 (L) 22.00 - 41.00 %    Monocytes 4.40 0.00 - 13.40 %    Eosinophils 4.40 0.00 - 6.90 %    Basophils 0.60 0.00 - 1.80 %    Immature Granulocytes 0.30 0.00 - 0.90 %    Nucleated RBC 0.00 /100 WBC    Neutrophils (Absolute) 9.10 (H) 2.00 -  7.15 K/uL    Lymphs (Absolute) 2.01 1.00 - 4.80 K/uL    Monos (Absolute) 0.54 0.00 - 0.85 K/uL    Eos (Absolute) 0.54 (H) 0.00 - 0.51 K/uL    Baso (Absolute) 0.08 0.00 - 0.12 K/uL    Immature Granulocytes (abs) 0.04 0.00 - 0.11 K/uL    NRBC (Absolute) 0.00 K/uL   Complete Metabolic Panel (CMP)   Result Value Ref Range    Sodium 133 (L) 135 - 145 mmol/L    Potassium 3.7 3.6 - 5.5 mmol/L    Chloride 103 96 - 112 mmol/L    Co2 22 20 - 33 mmol/L    Anion Gap 8.0 0.0 - 11.9    Glucose 217 (H) 65 - 99 mg/dL    Bun 18 8 - 22 mg/dL    Creatinine 1.20 0.50 - 1.40 mg/dL    Calcium 9.9 8.5 - 10.5 mg/dL    AST(SGOT) 15 12 - 45 U/L    ALT(SGPT) 11 2 - 50 U/L    Alkaline Phosphatase 64 30 - 99 U/L    Total Bilirubin 0.4 0.1 - 1.5 mg/dL    Albumin 4.5 3.2 - 4.9 g/dL    Total Protein 7.5 6.0 - 8.2 g/dL    Globulin 3.0 1.9 - 3.5 g/dL    A-G Ratio 1.5 g/dL   Prothrombin Time   Result Value Ref Range    PT 12.5 12.0 - 14.6 sec    INR 0.92 0.87 - 1.13   APTT   Result Value Ref Range    APTT 25.7 24.7 - 36.0 sec   Lipase   Result Value Ref Range    Lipase 17 11 - 82 U/L   ESTIMATED GFR   Result Value Ref Range    GFR If  54 (A) >60 mL/min/1.73 m 2    GFR If Non  44 (A) >60 mL/min/1.73 m 2   EKG   Result Value Ref Range    Report       [facility] Emergency [facility]    Test Date:  2019  Pt Name:    MICHELINE SILVA                  Department: ER  MRN:        [id number]                      Room:        13  Gender:     Female                       Technician: [id number]  :        [id number]                   Requested By:ER TRIAGE PROTOCOL  Order #:    [id number]                    Reading MD:    Measurements  Intervals                                Axis  Rate:       73                           P:          27  MI:         152                          QRS:        48  QRSD:       90                           T:          214  QT:         416  QTc:        459    Interpretive Statements  SINUS  RHYTHM  LVH WITH SECONDARY REPOLARIZATION ABNORMALITY  REPOL ABNRM, PROBABLE ISCHEMIA, ANT-LAT LEADS  Compared to ECG 01/13/2015 15:58:29  Sinus tachycardia no longer present  Sinus bradycardia no longer present  Possible ischemia still present             COURSE & MEDICAL DECISION MAKING  Pertinent Labs & Imaging studies reviewed. (See chart for details)  She presents here with hypertension and had chest discomfort prior to arrival.  Here EKG is very concerning for LVH with likely stress ischemia.  Her initial troponin is negative.  Initially considered activating a STEMI based on EKG morphology but I was able to review her prior tracings and it does not appear significantly worse actually looks somewhat better.  Consultation with cardiology was obtained with Dr. Craig she reviewed EKG and does not feel that it is a STEMI and we both feel that a formal cardiac consultation is not needed at this time.  Patient is given aspirin nitroglycerin and blood pressure still elevated.  She was given IV hydralazine will be admitted to [facility]    FINAL IMPRESSION  1.  Hypertensive urgency  2.  Chest pain      Electronically signed by: Donis Prescott, 2/24/2019 2:48 PM

## 2019-02-25 ENCOUNTER — APPOINTMENT (OUTPATIENT)
Dept: CARDIOLOGY | Facility: MEDICAL CENTER | Age: 74
DRG: 305 | End: 2019-02-25
Attending: STUDENT IN AN ORGANIZED HEALTH CARE EDUCATION/TRAINING PROGRAM
Payer: MEDICARE

## 2019-02-25 PROBLEM — R79.89 ELEVATED TROPONIN: Status: ACTIVE | Noted: 2019-02-25

## 2019-02-25 PROBLEM — R01.1 HEART MURMUR ON PHYSICAL EXAMINATION: Status: ACTIVE | Noted: 2019-02-25

## 2019-02-25 PROBLEM — N17.9 ACUTE KIDNEY INJURY (HCC): Status: ACTIVE | Noted: 2019-02-25

## 2019-02-25 LAB
ALBUMIN SERPL BCP-MCNC: 4.2 G/DL (ref 3.2–4.9)
ALBUMIN/GLOB SERPL: 1.1 G/DL
ALP SERPL-CCNC: 59 U/L (ref 30–99)
ALT SERPL-CCNC: 10 U/L (ref 2–50)
ANION GAP SERPL CALC-SCNC: 8 MMOL/L (ref 0–11.9)
AST SERPL-CCNC: 16 U/L (ref 12–45)
BILIRUB SERPL-MCNC: 0.6 MG/DL (ref 0.1–1.5)
BUN SERPL-MCNC: 19 MG/DL (ref 8–22)
CALCIUM SERPL-MCNC: 9.8 MG/DL (ref 8.5–10.5)
CHLORIDE SERPL-SCNC: 105 MMOL/L (ref 96–112)
CHOLEST SERPL-MCNC: 256 MG/DL (ref 100–199)
CO2 SERPL-SCNC: 23 MMOL/L (ref 20–33)
CREAT SERPL-MCNC: 0.88 MG/DL (ref 0.5–1.4)
EKG IMPRESSION: NORMAL
ERYTHROCYTE [DISTWIDTH] IN BLOOD BY AUTOMATED COUNT: 42.7 FL (ref 35.9–50)
EST. AVERAGE GLUCOSE BLD GHB EST-MCNC: 151 MG/DL
GLOBULIN SER CALC-MCNC: 3.8 G/DL (ref 1.9–3.5)
GLUCOSE BLD-MCNC: 118 MG/DL (ref 65–99)
GLUCOSE BLD-MCNC: 127 MG/DL (ref 65–99)
GLUCOSE BLD-MCNC: 90 MG/DL (ref 65–99)
GLUCOSE SERPL-MCNC: 138 MG/DL (ref 65–99)
HBA1C MFR BLD: 6.9 % (ref 0–5.6)
HCT VFR BLD AUTO: 42.1 % (ref 37–47)
HDLC SERPL-MCNC: 59 MG/DL
HGB BLD-MCNC: 14 G/DL (ref 12–16)
LDLC SERPL CALC-MCNC: 153 MG/DL
MAGNESIUM SERPL-MCNC: 2.1 MG/DL (ref 1.5–2.5)
MCH RBC QN AUTO: 30 PG (ref 27–33)
MCHC RBC AUTO-ENTMCNC: 33.3 G/DL (ref 33.6–35)
MCV RBC AUTO: 90.1 FL (ref 81.4–97.8)
PLATELET # BLD AUTO: 224 K/UL (ref 164–446)
PMV BLD AUTO: 10.9 FL (ref 9–12.9)
POTASSIUM SERPL-SCNC: 4 MMOL/L (ref 3.6–5.5)
PROT SERPL-MCNC: 8 G/DL (ref 6–8.2)
RBC # BLD AUTO: 4.67 M/UL (ref 4.2–5.4)
SODIUM SERPL-SCNC: 136 MMOL/L (ref 135–145)
TRIGL SERPL-MCNC: 221 MG/DL (ref 0–149)
TROPONIN I SERPL-MCNC: 0.28 NG/ML (ref 0–0.04)
TROPONIN I SERPL-MCNC: 0.4 NG/ML (ref 0–0.04)
TSH SERPL DL<=0.005 MIU/L-ACNC: 2.09 UIU/ML (ref 0.38–5.33)
WBC # BLD AUTO: 11.6 K/UL (ref 4.8–10.8)

## 2019-02-25 PROCEDURE — 84484 ASSAY OF TROPONIN QUANT: CPT

## 2019-02-25 PROCEDURE — 93005 ELECTROCARDIOGRAM TRACING: CPT | Performed by: STUDENT IN AN ORGANIZED HEALTH CARE EDUCATION/TRAINING PROGRAM

## 2019-02-25 PROCEDURE — 99232 SBSQ HOSP IP/OBS MODERATE 35: CPT | Mod: GC | Performed by: INTERNAL MEDICINE

## 2019-02-25 PROCEDURE — 83735 ASSAY OF MAGNESIUM: CPT

## 2019-02-25 PROCEDURE — 99223 1ST HOSP IP/OBS HIGH 75: CPT | Performed by: INTERNAL MEDICINE

## 2019-02-25 PROCEDURE — A9270 NON-COVERED ITEM OR SERVICE: HCPCS | Performed by: STUDENT IN AN ORGANIZED HEALTH CARE EDUCATION/TRAINING PROGRAM

## 2019-02-25 PROCEDURE — 700102 HCHG RX REV CODE 250 W/ 637 OVERRIDE(OP): Performed by: STUDENT IN AN ORGANIZED HEALTH CARE EDUCATION/TRAINING PROGRAM

## 2019-02-25 PROCEDURE — 770020 HCHG ROOM/CARE - TELE (206)

## 2019-02-25 PROCEDURE — 84443 ASSAY THYROID STIM HORMONE: CPT

## 2019-02-25 PROCEDURE — 700111 HCHG RX REV CODE 636 W/ 250 OVERRIDE (IP): Performed by: STUDENT IN AN ORGANIZED HEALTH CARE EDUCATION/TRAINING PROGRAM

## 2019-02-25 PROCEDURE — 36415 COLL VENOUS BLD VENIPUNCTURE: CPT

## 2019-02-25 PROCEDURE — 80053 COMPREHEN METABOLIC PANEL: CPT

## 2019-02-25 PROCEDURE — A9270 NON-COVERED ITEM OR SERVICE: HCPCS | Performed by: INTERNAL MEDICINE

## 2019-02-25 PROCEDURE — 700102 HCHG RX REV CODE 250 W/ 637 OVERRIDE(OP): Performed by: INTERNAL MEDICINE

## 2019-02-25 PROCEDURE — 80061 LIPID PANEL: CPT

## 2019-02-25 PROCEDURE — 83036 HEMOGLOBIN GLYCOSYLATED A1C: CPT

## 2019-02-25 PROCEDURE — 85027 COMPLETE CBC AUTOMATED: CPT

## 2019-02-25 PROCEDURE — 93010 ELECTROCARDIOGRAM REPORT: CPT | Performed by: INTERNAL MEDICINE

## 2019-02-25 PROCEDURE — 82962 GLUCOSE BLOOD TEST: CPT

## 2019-02-25 RX ORDER — HYDROXYZINE HYDROCHLORIDE 10 MG/1
10 TABLET, FILM COATED ORAL EVERY 8 HOURS PRN
Status: DISCONTINUED | OUTPATIENT
Start: 2019-02-25 | End: 2019-02-26 | Stop reason: HOSPADM

## 2019-02-25 RX ORDER — CARVEDILOL 3.12 MG/1
3.12 TABLET ORAL 2 TIMES DAILY WITH MEALS
Status: DISCONTINUED | OUTPATIENT
Start: 2019-02-25 | End: 2019-02-26

## 2019-02-25 RX ORDER — CARVEDILOL 6.25 MG/1
6.25 TABLET ORAL 2 TIMES DAILY WITH MEALS
Status: DISCONTINUED | OUTPATIENT
Start: 2019-02-25 | End: 2019-02-25

## 2019-02-25 RX ADMIN — ASPIRIN 81 MG: 81 TABLET, COATED ORAL at 05:37

## 2019-02-25 RX ADMIN — AMLODIPINE BESYLATE 10 MG: 10 TABLET ORAL at 05:37

## 2019-02-25 RX ADMIN — LORATADINE 10 MG: 10 TABLET ORAL at 05:36

## 2019-02-25 RX ADMIN — HYDRALAZINE HYDROCHLORIDE 10 MG: 10 TABLET, FILM COATED ORAL at 04:13

## 2019-02-25 RX ADMIN — ENOXAPARIN SODIUM 30 MG: 100 INJECTION SUBCUTANEOUS at 12:00

## 2019-02-25 RX ADMIN — CARVEDILOL 3.12 MG: 3.12 TABLET, FILM COATED ORAL at 17:37

## 2019-02-25 RX ADMIN — ATORVASTATIN CALCIUM 80 MG: 80 TABLET, FILM COATED ORAL at 17:36

## 2019-02-25 RX ADMIN — TRIAMCINOLONE ACETONIDE: 1 OINTMENT TOPICAL at 05:38

## 2019-02-25 RX ADMIN — SERTRALINE 25 MG: 50 TABLET, FILM COATED ORAL at 05:38

## 2019-02-25 RX ADMIN — LISINOPRIL 40 MG: 20 TABLET ORAL at 05:37

## 2019-02-25 RX ADMIN — CARVEDILOL 6.25 MG: 6.25 TABLET, FILM COATED ORAL at 10:09

## 2019-02-25 RX ADMIN — METOPROLOL TARTRATE 12.5 MG: 25 TABLET, FILM COATED ORAL at 05:37

## 2019-02-25 RX ADMIN — TRIAMCINOLONE ACETONIDE: 1 OINTMENT TOPICAL at 17:38

## 2019-02-25 ASSESSMENT — COGNITIVE AND FUNCTIONAL STATUS - GENERAL
SUGGESTED CMS G CODE MODIFIER MOBILITY: CH
DAILY ACTIVITIY SCORE: 24
MOBILITY SCORE: 24
SUGGESTED CMS G CODE MODIFIER DAILY ACTIVITY: CH

## 2019-02-25 ASSESSMENT — ENCOUNTER SYMPTOMS
COUGH: 0
PALPITATIONS: 0
NAUSEA: 0
DIARRHEA: 0
DIZZINESS: 0
TREMORS: 0
DOUBLE VISION: 0
HEADACHES: 0
TINGLING: 0
FEVER: 0
CHILLS: 0
WHEEZING: 0
SHORTNESS OF BREATH: 0
CONSTIPATION: 0
BLURRED VISION: 0
ABDOMINAL PAIN: 0
SORE THROAT: 0
VOMITING: 0
ORTHOPNEA: 0
SPUTUM PRODUCTION: 0

## 2019-02-25 NOTE — ED NOTES
"Pt with rash to body, seen multiple times and given multiple creams, pt to see dermatology but \"can't because of insurance\". Pt told possible eczema.   "

## 2019-02-25 NOTE — PROGRESS NOTES
ST depression noted on telemetry monitor. Repeated 12 lead EKG. Dr Mccallum and Dr Gama at bedside. No change on repeat EKG per MD.

## 2019-02-25 NOTE — CONSULTS
Cardiology Consult Note    Date note created:    2/25/2019          Patient ID:   Name:             Tania Madison   YOB: 1945  Age:                 73 y.o.  female   MRN:               [id number]               Referring Physician: [facility] team                                                  Reason for Consult:      Elevated troponin    History of Present Illness:    73-year-old female with no prior cardiac history who was brought in by her friend for chest pain.  Patient herself is unable to give me any history about her chest pain.  She is alert and oriented x3 but does not remember any details on her chest pain.  Keeps repeating that she is fine now.  Her family friend who is at bedside states that she was complaining of left-sided pain but does not recall any further details either.    Patient has a long-standing history of uncontrolled hypertension and is supposed to be on 4 medications for it however she has not been taking any of her medications.  Her friend states that she only takes it when she feels unwell but most days she chooses not to take her medications.    Patient's only concern is the rash that she is experiencing all over her body for the past month.    Review of Systems:      A full review of systems was completed and all pertinent positives and negatives are included in the HPI above. All others reviewed and negative.     Past Medical History:   Hypertension  Diabetes  History of left basal ganglia hemorrhage thought to be hypertension related in 2005  Depression  COPD    Past Surgical History:  None    Family History:  No family history of coronary artery disease.    Social History:  She is a lifelong non-smoker.  No alcohol or recreational drug use.    Hospital Medications:    Current Facility-Administered Medications:   •  nitroglycerin (NITROSTAT) tablet 0.4 mg, 0.4 mg, Sublingual, Q5 MIN PRN, Donis Prescott M.D., 0.4 mg at 02/24/19 1459  •  aspirin (ASA) tablet 325 mg,  325 mg, Oral, Once, Donis Prescott M.D.  •  senna-docusate (PERICOLACE or SENOKOT S) 8.6-50 MG per tablet 2 Tab, 2 Tab, Oral, BID, Stopped at 02/25/19 0522 **AND** polyethylene glycol/lytes (MIRALAX) PACKET 1 Packet, 1 Packet, Oral, QDAY PRN **AND** magnesium hydroxide (MILK OF MAGNESIA) suspension 30 mL, 30 mL, Oral, QDAY PRN **AND** bisacodyl (DULCOLAX) suppository 10 mg, 10 mg, Rectal, QDAY PRN, Suri Gama M.D.  •  Respiratory Care per Protocol, , Nebulization, Continuous RT, Suri Gama M.D.  •  hydrALAZINE (APRESOLINE) tablet 10 mg, 10 mg, Oral, TID PRN, Suri Gama M.D., 10 mg at 02/25/19 0413  •  amLODIPine (NORVASC) tablet 10 mg, 10 mg, Oral, Q DAY, Suri Gama M.D., 10 mg at 02/25/19 0537  •  lisinopril (PRINIVIL) tablet 40 mg, 40 mg, Oral, Q DAY, Suri Gama M.D., 40 mg at 02/25/19 0537  •  aspirin EC (ECOTRIN) tablet 81 mg, 81 mg, Oral, DAILY, Suri Gama M.D., 81 mg at 02/25/19 0537  •  [DISCONTINUED] insulin glargine (LANTUS) injection 10 Units, 0.2 Units/kg/day, Subcutaneous, Q EVENING **AND** [DISCONTINUED] insulin lispro (HUMALOG) injection 3 Units, 0.2 Units/kg/day, Subcutaneous, TID AC **AND** insulin lispro (HUMALOG) injection 1-6 Units, 1-6 Units, Subcutaneous, 4X/DAY ACHS, Stopped at 02/24/19 2030 **AND** Accu-Chek ACHS, , , Q AC AND BEDTIME(S) **AND** NOTIFY MD and PharmD, , , Once **AND** glucose 4 g chewable tablet 16 g, 16 g, Oral, Q15 MIN PRN **AND** dextrose 50% (D50W) injection 25 mL, 25 mL, Intravenous, Q15 MIN PRN, Suri Gama M.D.  •  sertraline (ZOLOFT) tablet 25 mg, 25 mg, Oral, DAILY, Suri Gama M.D., 25 mg at 02/25/19 0538  •  loratadine (CLARITIN) tablet 10 mg, 10 mg, Oral, DAILY, Suri Gama M.D., 10 mg at 02/25/19 0536  •  triamcinolone acetonide (KENALOG) 0.1 % ointment, , Topical, BID, Suri Gama M.D.  •  hyoscyamine-maalox plus-lidocaine viscous (GI COCKTAIL) oral susp 15 mL, 15 mL, Oral, Q6HRS PRN, Alayna Mccallum M.D.  •   "famotidine (PEPCID) tablet 20 mg, 20 mg, Oral, DAILY, Alayna Mccallum M.D.  •  atorvastatin (LIPITOR) tablet 80 mg, 80 mg, Oral, Q EVENING, Alayna Mccallum M.D.  •  metoprolol (LOPRESSOR) tablet 12.5 mg, 12.5 mg, Oral, TWICE DAILY, Alayna Mccallum M.D., 12.5 mg at 02/25/19 0537  •  diphenhydrAMINE (BENADRYL) tablet/capsule 25 mg, 25 mg, Oral, Q6HRS PRN, Karson Montes De Oca M.D., 25 mg at 02/24/19 2213  •  acetaminophen (TYLENOL) tablet 650 mg, 650 mg, Oral, Q4HRS PRN, Karson Montes De Oca M.D.    Medication Allergy:  No Known Allergies    Physical Exam:  Vitals/ General Appearance:   Weight/BMI: Body mass index is 20.24 kg/m².  Blood pressure (!) 163/74, pulse 68, temperature 36.9 °C (98.4 °F), temperature source Temporal, resp. rate 16, height 1.549 m (5' 1\"), weight 48.6 kg (107 lb 2.3 oz), SpO2 96 %, not currently breastfeeding.  Vitals:    02/24/19 2014 02/25/19 0025 02/25/19 0402 02/25/19 0532   BP: (!) 177/75 (!) 167/70 (!) 194/87 (!) 163/74   Pulse: 96 64 68    Resp: 19 18 15 16   Temp: 36.5 °C (97.7 °F) 37.6 °C (99.7 °F) 36.9 °C (98.4 °F)    TempSrc: Temporal Temporal Temporal    SpO2: 97% 97% 95% 96%   Weight:       Height:           Constitutional:   Well developed, Well nourished, No acute distress   HEENT:  Normocephalic, Atraumatic. Throat is supple.   Eyes: Conjunctiva normal  Neck:  Normal range of motion, no JVD.   Cardiovascular:  Normal rate, regular rhythm, grade 2/6 early peaking systolic murmur, No rubs, No gallops. Extremities with intact distal pulses, no cyanosis, or edema.   Lungs:  Normal breath sounds, breath sounds clear to auscultation bilaterally,  no crackles, no wheezing.   Abdomen:  Soft, No tenderness, no distension  Skin: Patient has diffuse rashes all over her body some of them are annular appearing.  Neurologic: Alert & oriented x 3   Psychiatric: Affect normal     MDM (Data Review):     Records reviewed and summarized in current documentation    Lab Data Review:  Recent Labs      " "02/24/19   1432 02/25/19   0251   WBC  12.3*  11.6*   RBC  4.75  4.67   HEMOGLOBIN  14.1  14.0   HEMATOCRIT  43.0  42.1   MCV  90.5  90.1   MCH  29.7  30.0   MCHC  32.8*  33.3*   RDW  41.5  42.7   PLATELETCT  222  224   MPV  11.2  10.9     Recent Labs      02/24/19   1432  02/25/19   0251   SODIUM  133*  136   POTASSIUM  3.7  4.0   CHLORIDE  103  105   CO2  22  23   GLUCOSE  217*  138*   BUN  18  19   CREATININE  1.20  0.88   CALCIUM  9.9  9.8     Recent Labs      02/24/19   1432  02/24/19   2030  02/25/19   0251   TROPONINI  0.02  0.05*  0.40*   BNPBTYPENAT  290*   --    --      Labs reviewed as noted above.  Normal creatinine.  Elevated troponin.  High LDL.    Imaging/Procedures Review:      EKG performed 2/24/2019 at 1720 hrs. was personally reviewed and per my interpretation shows sinus rhythm at 69 bpm.  Left ventricular hypertrophy with repolarization abnormality noted.  Possible ischemia in the anterolateral leads.    MDM (Assessment and Plan):     Active Hospital Problems    Diagnosis   • Chest pain [R07.9]     Priority: High   • Rash and other nonspecific skin eruption [R21]     Priority: Low   • Hypertensive emergency [I16.1]   • Leucocytosis [D72.829]   • Type 2 diabetes mellitus (HCC) [E11.9]   • Depression [F32.9]   • Anxiety [F41.9]     Elevated troponin:  Chest pain:  Uncontrolled hypertension:  Hyperlipidemia:    Patient is currently asymptomatic.  Unable to obtain any prior history.  She does not like to take medications.  I directly asked her if she had a heart attack would she want to do anything and the patient stated that \"I do not want an operation\" and later said \"I do not like to take medications\".  She does state that she likes to cut her medications in half whenever she wants because she does not like to \"take too many medications\".  However later on she stated that if the medications were free she might be interested in taking them however at the end of our discussion she again repeated " that she does not like to take medications.    Given the patient's history of noncompliance with medications and outpatient follow-up, I do not think that she is a good candidate for any ischemic workup.  If she undergoes a stress test and eventually ends up with a coronary angiogram and PCI, she is not a great candidate for dual antiplatelet therapy as she clearly states that she does not like to take medications.  Her risk for having stent thrombosis is very high.  I asked the patient that if she did have an underlying blockage she would be at risk for heart attack and if she did not take her medications are stopped her medications, she would be at risk for ventricular arrhythmias, congestive heart failure and even death.  Patient states that she does not care if she dies tomorrow but wants her rash to be treated.    Also her blood pressure is uncontrolled which could have contributed to her symptoms as well.  My recommendation would be to proceed with medical management for her hyperlipidemia, hypertension and her diabetes and follow-up with her in clinic.  She should also be on a baby aspirin.  If the patient is able to demonstrate compliance with her medications and follow-up with us in clinic, then we can proceed with further ischemic workup.  I will order an echocardiogram this morning to evaluate her LV function and her systolic murmur.    Once her blood pressure has been better controlled, we should reevaluate her as she may have a component of encephalopathy.  If despite having her blood pressure under control she continues to verbalize that she does not like to take medications, I would recommend medical management alone.     Will follow. Dr. Ernandez will be taking over the care of this patient.     Thank you for allowing me to participate in the care of this patient. Please do not hesitate to contact me with any questions.    Michaela Garcia MD  Cardiologist  [facility]

## 2019-02-25 NOTE — ASSESSMENT & PLAN NOTE
Patient is a poor historian due to persistent tangential thinking. Initially this was thought to be secondary to hypertensive emergency. However, patient remained circumferential in her thinking process. Patient remained alert and oriented x 4 and was able to communicate why she was in the hospital and that she would need to take her medications to prevent complications related to Hypertension. Patient was deemed to have medical decision making capacity at the time of evaluation.   - Currently stable   - Continue Sertraline   - Continue Hydroxyzine prn

## 2019-02-25 NOTE — ASSESSMENT & PLAN NOTE
Blood pressure 190s/90s on admission due to non-compliance with home medications. Complicated with chest pain and acute kidney injury. Patient treated with Amlodipine, Lisinopril, Beta-blocker and Hydralazine.

## 2019-02-25 NOTE — ASSESSMENT & PLAN NOTE
HbA1c is 6.9% on admission. Patient not taking any outpatient medication. Per chart review, she was prescribed PO metformin.   - Lifestyle modifications   - Outpatient follow up

## 2019-02-25 NOTE — PROGRESS NOTES
Chart reviewed for AMI and HF quality measures. Neither diagnosis applicable at this time, patient is being followed by cardiology for elevated troponin.     Cardiovascular Nurse Navigator will not follow.    Jennifer PALMER RN, [facility] ext. [phone] M-F

## 2019-02-25 NOTE — ASSESSMENT & PLAN NOTE
- Presented with rash of unclear etiology.  Eosinophilia on peripheral blood smear. Pruritic, no active secondary infections seen. Likely a variant of atopic dermatitis   - Continue Triamcinolone topical    - Hydroxyzine prn itching  - Outpatient dermatology referral to follow up.

## 2019-02-25 NOTE — NON-PROVIDER
Internal Medicine Medical Student Note  Note Author: Dipak Bradshaw, Student    Name Tania Madison     1945   Age/Sex 73 y.o. female   MRN [id number]   Code Status DNR/DNI             Reason for interval visit  (Principal Problem)   Hypertensive emergency    Interval Problem Daily Status Update  (problem status, last 24 hours, new history, new data )   Patient is circumferential to tangential and a poor historian making interview difficult.    Ms. Madison reports feeling better today. She denies current chest pain, difficulty breathing, or pain with breathing. She denies palpations. Patient denies current headache. Patient attributes her chest pain to anxiety. She was able to tolerate food this morning without N/V. Patient denies fever/chills. She feels somewhat weaker than normal, but this is unchanged overnight. Patient has had a BM without signs of blood. She is urinating, denies signs of gross hematuria.         Physical Exam       Vitals:    19 0532 19 0722 19 1004 19 1209   BP: (!) 163/74 155/66  154/75   Pulse:  (!) 57 71 (!) 55   Resp: 16 18  18   Temp:  36.5 °C (97.7 °F)  37.1 °C (98.8 °F)   TempSrc:  Temporal  Temporal   SpO2: 96% 94%  95%   Weight:       Height:         Body mass index is 20.24 kg/m². Weight: 48.6 kg (107 lb 2.3 oz)  Oxygen Therapy:  Pulse Oximetry: 95 %, O2 (LPM): 0, FiO2%: 21 %, O2 Delivery: None (Room Air)      Physical Exam   Constitutional: She is oriented to person, place, and time. No distress.   HENT:   Head: Normocephalic and atraumatic.   Cardiovascular: Exam reveals no gallop and no friction rub.    Murmur heard.   Crescendo decrescendo systolic murmur is present with a grade of 2/6   Pulmonary/Chest: Breath sounds normal. No accessory muscle usage. No respiratory distress. She has no decreased breath sounds. She has no wheezes. She has no rhonchi. She has no rales. She exhibits no tenderness.   Abdominal: Soft. Bowel sounds are  normal. She exhibits no distension. There is no tenderness. There is no rebound.   Musculoskeletal: She exhibits no edema.   Neurological: She is alert and oriented to person, place, and time. No cranial nerve deficit.   Skin: Skin is warm and dry. Rash noted. She is not diaphoretic.   Rash, patches,  present on both form arms and legs. Not warm, no erythema, no exudate appreciated.     A 2cm x 4cm bruise, purple in color, is present on patient's left flank/midaxillary line at approximately the 8-10rib.     Recent Labs      02/24/19   1432  02/25/19 0251   WBC  12.3*  11.6*   RBC  4.75  4.67   HEMOGLOBIN  14.1  14.0   HEMATOCRIT  43.0  42.1   MCV  90.5  90.1   MCH  29.7  30.0   RDW  41.5  42.7   PLATELETCT  222  224   MPV  11.2  10.9   NEUTSPOLYS  74.00*   --    LYMPHOCYTES  16.30*   --    MONOCYTES  4.40   --    EOSINOPHILS  4.40   --    BASOPHILS  0.60   --      Recent Labs      02/24/19   1432  02/25/19 0251   SODIUM  133*  136   POTASSIUM  3.7  4.0   CHLORIDE  103  105   CO2  22  23   GLUCOSE  217*  138*   BUN  18  19     Recent Labs      02/24/19   1432  02/25/19 0251   ALBUMIN  4.5  4.2   TBILIRUBIN  0.4  0.6   ALKPHOSPHAT  64  59   TOTPROTEIN  7.5  8.0   ALTSGPT  11  10   ASTSGOT  15  16   CREATININE  1.20  0.88     Lab Results   Component Value Date/Time    PROTHROMBTM 12.5 02/24/2019 02:32 PM    INR 0.92 02/24/2019 02:32 PM     HEMOGLOBIN A1C [[id number]] (Abnormal) Collected: 02/25/19 0251   Order Status: Completed Specimen: Blood Updated: 02/25/19 1232    Glycohemoglobin 6.9 (H) %     Est Avg Glucose 151 mg/dL      TROPONIN [[id number]] (Abnormal) Collected: 02/25/19 1024   Order Status: Completed Specimen: Blood Updated: 02/25/19 1122    Troponin I 0.28 (H) ng/mL    TROPONIN [[id number]] (Abnormal) Collected: 02/25/19 0251   Order Status: Completed Specimen: Blood Updated: 02/25/19 0401    Troponin I 0.40 (H) ng/mL      Results for orders placed or performed during the hospital encounter of 02/24/19    EKG   Result Value Ref Range    Report       [facility] Emergency Dept.    Test Date:  2019  Pt Name:    [facility]                  Department: ER  MRN:        [id number]                      Room:       BL 13  Gender:     Female                       Technician: [id number]  :        1945                   Requested By:ER TRIAGE PROTOCOL  Order #:    [id number]                    Reading MD:    Measurements  Intervals                                Axis  Rate:       73                           P:          27  AR:         152                          QRS:        48  QRSD:       90                           T:          214  QT:         416  QTc:        459    Interpretive Statements  SINUS RHYTHM  LVH WITH SECONDARY REPOLARIZATION ABNORMALITY  REPOL ABNRM, PROBABLE ISCHEMIA, ANT-LAT LEADS  Compared to ECG 2015 15:58:29  Sinus tachycardia no longer present  Sinus bradycardia no longer present  Possible ischemia still present     EKG   Result Value Ref Range    Report       [facility]    Test Date:  2019  Pt Name:    [facility]                  Department: CPU  MRN:        [id number]                      Room:       T214  Gender:     Female                       Technician: TXM  :        1945                   Requested By:ANTIONETTE PANDA  Order #:    [id number]                    Reading MD: Michaela Garcia MD    Measurements  Intervals                                Axis  Rate:       69                           P:          42  AR:         152                          QRS:        51  QRSD:       94                           T:          237  QT:         472  QTc:        506    Interpretive Statements  SINUS RHYTHM  LVH WITH SECONDARY REPOLARIZATION ABNORMALITY  REPOL ABNRM, PROBABLE ISCHEMIA, DIFFUSE LEADS  BORDERLINE PROLONGED QT INTERVAL  Compared to ECG 2019 14:37:11  No significant changes    Electronically Signed On 2019 18:54:28 PST by Michaela Garcia  MD     EKG   Result Value Ref Range    Report       [facility]    Test Date:  2019  Pt Name:    MICHELINE SILVA                  Department: CPU  MRN:        [id number]                      Room:       T214  Gender:     Female                       Technician: THOMAS  :        1945                   Requested By:LAUREN ZAMORA  Order #:    [id number]                    Reading MD: Michaela Garcia MD    Measurements  Intervals                                Axis  Rate:       69                           P:          31  CO:         132                          QRS:        50  QRSD:       92                           T:          214  QT:         448  QTc:        480    Interpretive Statements  SINUS RHYTHM  LVH WITH SECONDARY REPOLARIZATION ABNORMALITY  REPOL ABNRM, PROBABLE ISCHEMIA, DIFFUSE LEADS  Compared to ECG 2019 17:20:19  No significant changes    Electronically Signed On 2019 6:31:03 PST by Michaela Garcia MD           Assessment/Plan     This is a 74yo female who presented to the hospital on 2019 with sternal chest pain brought on with exceration and a history significant for diabetes and HTN all concerning for ischemic heart strain.    #hypertensive emergency  -continued hypertensive episodes overnight in the 160's/70's to 194/87   - down trending this morning  -elevated troponin, 0.4 this morning (19) from 0.02 at admission.  -BNP elevated to 290 at admission.  -EKG consistent w/ LVH.  -Non compliance with home HTN medications.  -cardiology consulted   -patient indicated she is not interested in surgical/endovascular intervention at this time  Plan  -discontinued metoprolol per cardiology  -medical control of hypertension   -Carvedilol 6.25mg BID    -hydralazine 10mg q8hr   -lisinopril 40mg qDay   -amlodipine 10mg POD qDay  -continue tele monitoring  -Echo to assess for valvular disease    #atypical chest pain  -DM, history of HTN w/ poor medical compliance and female gender all  concerning atypical chest pain presentation  -EKG shows signs of LVH  -Elevated BNP, 290 at admission, and rising troponin (0.4 this morning 09/25/19 from 0.02 at admission) concerning for heart strain  -unclear if patient is exertional vs anxiety  Plan  -control HTN (as above)  -Echo  -tele  - ASA and atorvastatin for risk CAD factor mitigation    #leucocytosis  -down trending to 11.6 today (02/25/19) from 12.3 on admission  -afebrile  -no clear source of infection  -likely 2/2 to stress  Plan  -continue to monitor for signs of infection    #Type 2 diabetes mellitus  -a1c 6.9  -poor adherence to outpatient metformin  Plan  -insulin adjustable scale  -discuss risks of diabetes.     #depression  - continue home sertraline    #anxiety  - hydroxyzine 10mg q8hr    #rash  -kenalog 0.1% ointment  - hydroxyzine 10mg q8hr for itch  -continue to monitor

## 2019-02-25 NOTE — DISCHARGE PLANNING
Care Transition Team Assessment    Met with pt and friend Liam at bedside. According to pt she lives alone, is independent at baseline and does not use assistive devices.    No obvious needs identified at this time, Liam confirmed he will provide transport at discharge.    Information Source  Orientation : Oriented x 4  Information Given By: Patient    Readmission Evaluation  Is this a readmission?: No (Pt was in ER 2/9)    Interdisciplinary Discharge Planning  Does Admitting Nurse Feel This Could be a Complex Discharge?: No  Primary Care Physician: Aris HAJI  Lives with - Patient's Self Care Capacity: Alone and Able to Care For Self  Support Systems: Friends / Neighbors (Reza Anne)  Do You Take your Prescribed Medications Regularly:  (Not taking medications currently)  Able to Return to Previous ADL's: Yes  Prior Services: None  Patient Expects to be Discharged to:: Home  Assistance Needed: No  Durable Medical Equipment: Not Applicable    Discharge Preparedness  What is your plan after discharge?:  (Anticipate discharge to prior living)  What are your discharge supports?: Other (comment) (Friend - Liam)  Prior Functional Level: Ambulatory, Independent with Activities of Daily Living, Independent with Medication Management  Difficulity with ADLs: None  Difficulity with IADLs: None    Functional Assesment  Prior Functional Level: Ambulatory, Independent with Activities of Daily Living, Independent with Medication Management    Finances  Prescription Coverage: Yes    Discharge Risks or Barriers  Discharge risks or barriers?: No    Anticipated Discharge Information  Anticipated discharge disposition: Home  Discharge Address: [address] [place] [address] [address], [place]  Discharge Contact Phone Number: [phone]

## 2019-02-25 NOTE — PROGRESS NOTES
2RN skin check:     Pt has multiple redness/rash noted all over body. More prominent and bilat arms, bilat leg, left breast, low abdomen and thigh. Kenalog cream applied per MD order. Pictures taken and uploaded.

## 2019-02-25 NOTE — H&P
Internal Medicine Admitting History and Physical    Note Author: Suri Gama M.D.       Name Tania Madison 1945   Age/Sex 73 y.o. female   MRN [id number]   Code Status FULL      After 5PM or if no immediate response to page, please call for cross-coverage  Attending/Team: Dr. Singh/ [employer] [facility] See Patient List for primary contact information  Call [phone] to page    1st Call - Day Intern (R1):   Dr. Gama 2nd Call - Day Sr. Resident (R2/R3):   Dr. Mccallum       Chief Complaint:   Elevated blood pressure, chest discomfort    HPI:  This is a 73 years old female, with PMH of HTN, Depression, DM (?), very poor historian was brought to the ER by her friend today for having elevated blood pressure readings at home.  Also she has been experiencing intermittent chest discomfort which is mainly in her sternal area.  Unsure what exacerbates/relieves the pain.  During H&P she experienced 3/4 episodes of chest pain, relieved by itself. No previous history of coronary artery disease/no clear family history could be obtained from the patient due to her current mental status.  Per patient's friend, she is confused like that in her baseline.  At home, per chart review she takes lisinopril 40 mg daily, which patient states she has not been taking any medications.  She states she feels fine when she does not take any medications.  She repeatedly kept saying that she feels nervous/anxious.  However, denies any headache/change of vision/palpitation/abdominal pain/change in bowel or bladder habit.  She endorses some dizziness, unclear when gets worse.     On examination, patient was noted to have several pruritic round scaly rash all over her body.  Per patient's friend her place was bug infested, which has been decontaminated.  Per patient has seen dermatologist for the rashes, on outpatient medication list there is hydrocortisone topical cream prescription, unclear if patient is using it. She was  seen in the ER on 02/09 for rash, from ER physician note, she was seen in [facility] on 02/05 for the same symptoms,and was given triamcinolone cream and hydroxyzine which didn't provide much relief. That time she denied seeing a dermatologist.    Off note, when asked about CODE STATUS, I do not think patient understood the question as she kept changing her answers from being full code to DNR.  She also could not say if there is any POA for her.  Her family members (unclear for the family member) live in [place], patient's friend who brought the patient to ER unsure if she has any POA.  However he agrees that patient is not understanding the situation/question.  Will keep the patient full code for now until a family member/ NOK can be reached.     No recent provider visit in last 4 years, last office visit in 2015.      ER course: Blood pressure elevated at 190s/90s.  Pulse 70s, respiratory rate 18-19, no supplemental oxygen was needed.  EKG was concerning for left ventricular strain, CXR consistent with enlarged cardiac shadow.  Cardiology was contacted from ER, Dr. rCaig reviewed the case, does not think this is STEMI or any formal cardiology consultation is needed at this point. EKG changes are probably from reciprocal changes due to LVH. Hydralazine 10 mg IV was given for control of blood pressure.     Review of Systems   Reason unable to perform ROS: Limited due to patient's confusion.   Constitutional: Negative for chills, fever and weight loss.   HENT: Negative for hearing loss and tinnitus.    Eyes: Negative for blurred vision and double vision.   Respiratory: Negative for cough and hemoptysis.    Cardiovascular: Positive for chest pain. Negative for palpitations, claudication and leg swelling.   Gastrointestinal: Negative for abdominal pain, heartburn, nausea and vomiting.   Genitourinary: Negative for dysuria, frequency and urgency.   Musculoskeletal: Negative for myalgias and neck pain.   Skin:  Positive for itching and rash.   Neurological: Positive for dizziness. Negative for tingling, tremors, sensory change, focal weakness and headaches.   Psychiatric/Behavioral: Negative for depression, substance abuse and suicidal ideas.             Past Medical History (Chronic medical problem, known complications and current treatment)    HTN : On lisinopril, Amlodipine, HCTZ, patient not taking  DM: Per chart review on Metformin, patient unsure, not taking any medication.   Intracranial hemorrhage ( 2005, per chart review)   Depression, on Zoloft, patient not taking.         Past Surgical History:  No past surgical history on file. Patient too confused to give any history.    Current Outpatient Medications:  Home Medications     Reviewed by Joellen Barksdale (Pharmacy Tech) on 02/24/19 at 1548  Med List Status: Complete   Medication Last Dose Status   hydrocortisone 1 % Cream unknown Active   lisinopril (PRINIVIL, ZESTRIL) 40 MG tablet 2/24/2019 Active                Medication Allergy/Sensitivities:  No Known Allergies      Family History (mandatory)   Family History   Problem Relation Age of Onset   • Other Mother         unknown   • Other Father         unknown       Social History (mandatory)   Social History     Social History   • Marital status: Single     Spouse name: N/A   • Number of children: N/A   • Years of education: N/A     Occupational History   • Not on file.     Social History Main Topics   • Smoking status: Never Smoker   • Smokeless tobacco: Not on file   • Alcohol use No   • Drug use: No   • Sexual activity: Not on file     Other Topics Concern   • Not on file     Social History Narrative   • No narrative on file     Living situation: Home  PCP : No primary care provider on file.    Physical Exam     Vitals:    02/24/19 1600 02/24/19 1622 02/24/19 1638 02/24/19 1704   BP:    (!) 181/82   Pulse: 87 85  76   Resp: (!) 8 (!) 23  19   Temp:   36.9 °C (98.4 °F) 36.8 °C (98.3 °F)   TempSrc:    "Temporal Temporal   SpO2: 97% 97%  97%   Weight:    48.6 kg (107 lb 2.3 oz)   Height:    1.549 m (5' 1\")     Body mass index is 20.24 kg/m².  BP (!) 181/82   Pulse 76   Temp 36.8 °C (98.3 °F) (Temporal)   Resp 19   Ht 1.549 m (5' 1\")   Wt 48.6 kg (107 lb 2.3 oz)   SpO2 97%   Breastfeeding? No   BMI 20.24 kg/m²   O2 therapy: Pulse Oximetry: 97 %, O2 (LPM): 0, O2 Delivery: None (Room Air)    Physical Exam   Constitutional: She appears not dehydrated.  Non-toxic appearance. She does not have a sickly appearance. She appears distressed.   HENT:   Head: Normocephalic and atraumatic.   Eyes: Conjunctivae are normal. Right eye exhibits no discharge. Left eye exhibits no discharge.   Neck: Normal range of motion. Neck supple. No JVD present.   Cardiovascular: Normal rate, regular rhythm and intact distal pulses.    Murmur heard.  Pulmonary/Chest: Effort normal and breath sounds normal. No respiratory distress. She has no wheezes. She has no rales.   Abdominal: Soft. Bowel sounds are normal. She exhibits no distension. There is no tenderness.   Musculoskeletal: Normal range of motion. She exhibits no edema.   Neurological: She has normal sensation, normal strength, normal reflexes and intact cranial nerves.   Alert, oriented x 3, but very poor historian, not sure if any baseline cognitive disorder   Skin: Skin is warm. Rash (brown spotted rash all over body, pruritic) noted.   Psychiatric: Affect and judgment normal.         Data Review       Old Records Request:   Completed  Current Records review/summary: Completed    Lab Data Review:  Recent Results (from the past 24 hour(s))   Troponin    Collection Time: 02/24/19  2:32 PM   Result Value Ref Range    Troponin I 0.02 0.00 - 0.04 ng/mL   Btype Natriuretic Peptide    Collection Time: 02/24/19  2:32 PM   Result Value Ref Range    B Natriuretic Peptide 290 (H) 0 - 100 pg/mL   CBC with Differential    Collection Time: 02/24/19  2:32 PM   Result Value Ref Range    WBC " 12.3 (H) 4.8 - 10.8 K/uL    RBC 4.75 4.20 - 5.40 M/uL    Hemoglobin 14.1 12.0 - 16.0 g/dL    Hematocrit 43.0 37.0 - 47.0 %    MCV 90.5 81.4 - 97.8 fL    MCH 29.7 27.0 - 33.0 pg    MCHC 32.8 (L) 33.6 - 35.0 g/dL    RDW 41.5 35.9 - 50.0 fL    Platelet Count 222 164 - 446 K/uL    MPV 11.2 9.0 - 12.9 fL    Neutrophils-Polys 74.00 (H) 44.00 - 72.00 %    Lymphocytes 16.30 (L) 22.00 - 41.00 %    Monocytes 4.40 0.00 - 13.40 %    Eosinophils 4.40 0.00 - 6.90 %    Basophils 0.60 0.00 - 1.80 %    Immature Granulocytes 0.30 0.00 - 0.90 %    Nucleated RBC 0.00 /100 WBC    Neutrophils (Absolute) 9.10 (H) 2.00 - 7.15 K/uL    Lymphs (Absolute) 2.01 1.00 - 4.80 K/uL    Monos (Absolute) 0.54 0.00 - 0.85 K/uL    Eos (Absolute) 0.54 (H) 0.00 - 0.51 K/uL    Baso (Absolute) 0.08 0.00 - 0.12 K/uL    Immature Granulocytes (abs) 0.04 0.00 - 0.11 K/uL    NRBC (Absolute) 0.00 K/uL   Complete Metabolic Panel (CMP)    Collection Time: 02/24/19  2:32 PM   Result Value Ref Range    Sodium 133 (L) 135 - 145 mmol/L    Potassium 3.7 3.6 - 5.5 mmol/L    Chloride 103 96 - 112 mmol/L    Co2 22 20 - 33 mmol/L    Anion Gap 8.0 0.0 - 11.9    Glucose 217 (H) 65 - 99 mg/dL    Bun 18 8 - 22 mg/dL    Creatinine 1.20 0.50 - 1.40 mg/dL    Calcium 9.9 8.5 - 10.5 mg/dL    AST(SGOT) 15 12 - 45 U/L    ALT(SGPT) 11 2 - 50 U/L    Alkaline Phosphatase 64 30 - 99 U/L    Total Bilirubin 0.4 0.1 - 1.5 mg/dL    Albumin 4.5 3.2 - 4.9 g/dL    Total Protein 7.5 6.0 - 8.2 g/dL    Globulin 3.0 1.9 - 3.5 g/dL    A-G Ratio 1.5 g/dL   Prothrombin Time    Collection Time: 02/24/19  2:32 PM   Result Value Ref Range    PT 12.5 12.0 - 14.6 sec    INR 0.92 0.87 - 1.13   APTT    Collection Time: 02/24/19  2:32 PM   Result Value Ref Range    APTT 25.7 24.7 - 36.0 sec   Lipase    Collection Time: 02/24/19  2:32 PM   Result Value Ref Range    Lipase 17 11 - 82 U/L   ESTIMATED GFR    Collection Time: 02/24/19  2:32 PM   Result Value Ref Range    GFR If  54 (A) >60  mL/min/1.73 m 2    GFR If Non  44 (A) >60 mL/min/1.73 m 2   EKG    Collection Time: 19  2:37 PM   Result Value Ref Range    Report       [facility] Emergency Dept.    Test Date:  2019  Pt Name:    [facility]                  Department: ER  MRN:        [id number]                      Room:       BL 13  Gender:     Female                       Technician: [id number]  :        1945                   Requested By:ER TRIAGE PROTOCOL  Order #:    [id number]                    Reading MD:    Measurements  Intervals                                Axis  Rate:       73                           P:          27  CA:         152                          QRS:        48  QRSD:       90                           T:          214  QT:         416  QTc:        459    Interpretive Statements  SINUS RHYTHM  LVH WITH SECONDARY REPOLARIZATION ABNORMALITY  REPOL ABNRM, PROBABLE ISCHEMIA, ANT-LAT LEADS  Compared to ECG 2015 15:58:29  Sinus tachycardia no longer present  Sinus bradycardia no longer present  Possible ischemia still present     EKG    Collection Time: 19  5:20 PM   Result Value Ref Range    Report       [facility]    Test Date:  2019  Pt Name:    [facility]                  Department: CPU  MRN:        [id number]                      Room:       T214  Gender:     Female                       Technician: [id number]  :        1945                   Requested By:ANTIONETTE PANDA  Order #:    [id number]                    Reading MD:    Measurements  Intervals                                Axis  Rate:       69                           P:          42  CA:         152                          QRS:        51  QRSD:       94                           T:          237  QT:         472  QTc:        506    Interpretive Statements  SINUS RHYTHM  LVH WITH SECONDARY REPOLARIZATION ABNORMALITY  REPOL ABNRM, PROBABLE ISCHEMIA, DIFFUSE LEADS  BORDERLINE PROLONGED QT  INTERVAL  Compared to ECG 02/24/2019 14:37:11  No significant changes         Imaging/Procedures Review:    Independant Imaging Review: Completed  DX-CHEST-LIMITED (1 VIEW)   Final Result         Enlarged cardiac silhouette without evidence of acute disease.      EC-ECHOCARDIOGRAM COMPLETE W/O CONT    (Results Pending)       EKG:   EKG Independent Review: Completed  QTc: 459, HR: 73, SINUS RHYTHM   LVH WITH SECONDARY REPOLARIZATION ABNORMALITY     Records reviewed and summarized in current documentation :  Yes  [employer] teaching service handout given to patient:  No         Assessment/Plan     * Hypertensive emergency   Assessment & Plan    Blood pressure 190s/90s on admission, complicated with chest pain. Denies any headache/ vision change. Wide pulse pressure. Might be age related. EKG consistent with LVH. Last echo in 2014: Normal EF of 65-70% with grade II diastolic dysfunction. She is noncompliant with her home medications.   - Restarting home medication, Amlodipine/ Lisinopril.   - PRN Hydralazine for SBP > 180 or DBP > 120  - Echo to check for cardiac function     Chest pain- (present on admission)   Assessment & Plan    Patient presented with chest pain/discomfort, on and off for last couple of weeks.  On admission initial troponin negative, EKG concerning for STEMI.  Case discussed with on-call cardiologist from ER, does not think this is STEMI.  EKG findings are consistent with left ventricular strain with reciprocal changes. Case discussed with dr. Momin from ER, NOT concerning for STEMI. These are reciprocal changes due to LVH. No need of formal Cardiology consult at these point. Initial trop negative, trend trop  - Chest pain can be from anxiety, PRN Hydroxyzine ordered  - GI cocktail ordered for heart burn related pain  - No known cardiac family/personal history, however very poor historian.   - Control BP with Amlodipine and Lisinopril. PRN hydralazine for SBP > 120 or DBP > 120  - Don't think this a  case of ACS, low probability for coronary artery disease. Might consider stress test if pain persists. Added daily ASA, Atorvastatin 80, metoprolol.      Leucocytosis   Assessment & Plan    WBC elevated on admission, no clear source of infection. Might be reactive. Will ctm      Type 2 diabetes mellitus (HCC)- (present on admission)   Assessment & Plan    Last HbA1c is 6.8 in 2015, patient not taking any opt medication. Per chart review, she was prescribed PO metformin.   -Metformin on hold, ISS for now  -Recheck HbA1c tomorrow.      Depression- (present on admission)   Assessment & Plan    On Zoloft as outpatient, unclear if patient was taking it.   Resumed out patient dose.     Anxiety- (present on admission)   Assessment & Plan    Patient kept saying that she is feeling anxious/ nervous.   PRN Hydroxyzine for anxiety.     Rash and other nonspecific skin eruption   Assessment & Plan    - Presented with rash of unclear etiology. Pruritic, no active secondary infections seen. Failed (not sure whether she was compliant) topical steroids. Questionable bed bug infestation.   - PO Loratadine for itching.   - Continue topical high potency steroids for rash and see response.   - She will need out patient dermatology referral to follow up.          Anticipated Hospital stay: Observation admit        Quality Measures  Quality-Core Measures   Reviewed items::  Radiology images reviewed, EKG reviewed, Labs reviewed and Medications reviewed  Hathaway catheter::  No Hathaway  DVT prophylaxis - mechanical:  SCDs    PCP: No primary care provider on file.

## 2019-02-25 NOTE — PROGRESS NOTES
Assessment completed.  Pt A&Ox4.  Pt's blood pressure 182/82, prn medication administered.  Respirations even, unlabored on room air; diminished lung sound to RLL and LLL.  Pt denies any pain at this time.  Pt denies any CP, SOB, nausea, palpitations at this time.  Monitors applied.    Bed in locked, lowest position.  Call light and belongings within reach.  Pt updated on POC: awaiting echocardiogram, accu checks ACHS, AM lab.  Communication board updated.  Hot meal provided.  Needs met, will continue to monitor

## 2019-02-25 NOTE — PROGRESS NOTES
Transferred to T810 per physician order. Transporting patient via wheelchair with transport and RN. Tele monitor on 70's during transport. Pt in stable condition. Belongings with patient at time of transfer. Bedside report given to Tele RN.  Plan of care and pertinent background information given and discussed.  Questions answered.  Family friend at bedside.

## 2019-02-25 NOTE — PROGRESS NOTES
"MD Montes De Oca notified regarding troponin of .40. Pt has on and off \"chest pressure\" but denies any pain right now. Order for EKG    "

## 2019-02-25 NOTE — PROGRESS NOTES
Internal Medicine Interval Note  Note Author: Tucker Hawkins D.O.     Name Tania Madison     1945   Age/Sex 73 y.o. female   MRN [id number]   Code Status DNAR/DNI     After 5PM or if no immediate response to page, please call for cross-coverage  Attending/Team: Dr. Singh / [employer]  See Patient List for primary contact information  Call [phone] to page    1st Call - Day Intern (R1):   Dr. Hawkins 2nd Call - Day Sr. Resident (R2/R3):   Dr. Mccallum          Reason for interval visit  (Principal Problem)   Hypertensive Emergency       Interval Problem Daily Status Update  (24 hours, problem oriented, brief subjective history, new lab/imaging data pertinent to that problem)   Patient reports the chest discomfort has improved. The pruritic rash has also improved in intensity.     Overnight, troponin's were elevated from 0.02=> 0.05 => 0.40 with persistent ST depressions in  V2-V6. Cardiology was consulted and given patient's history of noncompliance she was not a good candidate for ischemic workup. Recommended to continue medical management.     During attending rounds, patient was more alert and oriented. She was able to tell us the reason why she was in the hospital and reiterated that she did not want any cardiac intervention. Extensive discussion with patient regarding code status and she elected to be DNAR/DNI.     Review of Systems   Constitutional: Negative for chills and fever.   HENT: Negative for congestion, hearing loss, sore throat and tinnitus.    Eyes: Negative for blurred vision and double vision.   Respiratory: Negative for cough, sputum production, shortness of breath and wheezing.    Cardiovascular: Negative for chest pain, palpitations, orthopnea and leg swelling.   Gastrointestinal: Negative for abdominal pain, constipation, diarrhea, nausea and vomiting.   Genitourinary: Negative for dysuria, frequency and urgency.   Skin: Positive for itching and rash.   Neurological:  Negative for dizziness, tingling, tremors and headaches.       Disposition/Barriers to discharge:   Inpatient for hypertensive emergency     Consultants/Specialty  Cardiology     PCP: No primary care provider on file.      Quality Measures  Quality-Core Measures   Reviewed items::  EKG reviewed, Labs reviewed, Medications reviewed and Radiology images reviewed  Hathaway catheter::  No Hathaway  DVT prophylaxis pharmacological::  Enoxaparin (Lovenox)      Physical Exam       Vitals:    02/25/19 0722 02/25/19 1004 02/25/19 1209 02/25/19 1614   BP: 155/66  154/75 152/77   Pulse: (!) 57 71 (!) 55 (!) 59   Resp: 18  18 18   Temp: 36.5 °C (97.7 °F)  37.1 °C (98.8 °F) 36.6 °C (97.9 °F)   TempSrc: Temporal  Temporal Temporal   SpO2: 94%  95% 95%   Weight:       Height:         Body mass index is 20.24 kg/m². Weight: 48.6 kg (107 lb 2.3 oz)  Oxygen Therapy:  Pulse Oximetry: 95 %, O2 (LPM): 0, FiO2%: 21 %, O2 Delivery: None (Room Air)    Physical Exam   Constitutional: She is oriented to person, place, and time.   Female of  descent in no acute distress. Answering questions appropriately.    HENT:   Head: Normocephalic and atraumatic.   Mouth/Throat: Oropharynx is clear and moist.   Eyes: Pupils are equal, round, and reactive to light. EOM are normal. No scleral icterus.   Neck: Normal range of motion. No tracheal deviation present.   Cardiovascular: Normal rate, regular rhythm and intact distal pulses.    Murmur (2/6 systolic ejection murmur) heard.  Pulmonary/Chest: Effort normal and breath sounds normal. No respiratory distress. She has no wheezes. She has no rales. She exhibits no tenderness.   Abdominal: Soft. She exhibits no distension. There is no tenderness. There is no rebound and no guarding.   Musculoskeletal: Normal range of motion. She exhibits no edema.   Neurological: She is alert and oriented to person, place, and time. No cranial nerve deficit. She exhibits normal muscle tone.   Skin: Skin is warm and dry.  Rash (Hypopigmented ovaloid rash on bilateral legs, dorsal surface of hands, forearms, face noticed. ) noted.   Psychiatric: Her mood appears anxious.       Assessment/Plan     * Hypertensive emergency   Assessment & Plan    Blood pressure 190s/90s on admission due to non-compliance with home medications. Complicated with chest pain and acute kidney injury.   - Continue Amlodipine 10mg   - Continue Lisinopril 40mg   - Continue Coreg 6.25mg BID   - PRN Hydralazine for SBP > 180 or DBP > 120  - Echocardiogram pending       Elevated troponin   Assessment & Plan    Likely secondary to demand in setting of hypertensive emergency. YARELIS risk score of 4 and even if chest pain and elevated Troponins were of ischemic etiology, patient has refused any sort of cardiac intervention. Patient is also a poor candidate due to history of non-compliance. Cardiology has recommended medical management for now, unless patient changes her mind regarding intervention and taking medications. Last echo in 2014: Normal EF of 65-70% with grade II diastolic dysfunction.   - Daily Aspirin   - Atorvastatin increased to 80mg   - Coreg BID        Chest pain- (present on admission)   Assessment & Plan    Patient presented with chest pain/discomfort, on and off for last couple of weeks.  On admission initial troponin negative with EKG findings of ST depression and some T wave changes in the anteroseptal leads. Chest pain resolved with blood pressure control   - Famotidine and GI cocktail prn  - Continue to monitor        Heart murmur on physical examination   Assessment & Plan    - Echocardiogram pending     Leucocytosis   Assessment & Plan    Likely reactive as no source of infection   - Improving   - Continue to monitor      Type 2 diabetes mellitus (HCC)- (present on admission)   Assessment & Plan    HbA1c is 6.9% on admission. Patient not taking any outpatient medication. Per chart review, she was prescribed PO metformin.   - Metformin on hold  -  ISS for now with hypoglycemia protocol   - Patient will benefit from diabetes education      Depression- (present on admission)   Assessment & Plan    - Continue Sertraline     Anxiety- (present on admission)   Assessment & Plan    - Currently stable   - Continue Sertraline   - Continue Hydroxyzine prn      Rash and other nonspecific skin eruption   Assessment & Plan    - Presented with rash of unclear etiology. Pruritic, no active secondary infections seen. Likely a variant of atopic dermatitis   - Continue Loratadine   - Continue Triamcinolone topical    - Outpatient dermatology referral to follow up.

## 2019-02-25 NOTE — PROGRESS NOTES
"[facility] Cardiology Follow-up Consult Note    Date of note:    2/25/2019      Consulting Physician: No att. providers found      Chief Complaint   Patient presents with   • Chest Pain       Interim Events:  Patient denies any chest pain now.  States she has left sided chest pain only during an anxiety attack or when she is \"mad\" at someone. Denies exertional chest pain or radiation of pain or relief with rest  Patient poor historian, tangential.  Explained to her about rising troponin and discussed about stress test or diagnostic catheterization and possible stent placement if any stenosis- patient repeatedly states that her heart is fine and she doesn't want any procedures to fix her heart if any. Reports that she came in only for her rash.   Trop 0.02-->0.05-->0.40-->0.28  EKG showed HR of 69 with LVH     ROS  No NV, No Bleeding, No dizziness   All other review of systems reviewed and negative.    Past medical, surgical, social, and family history reviewed and unchanged from admission except as noted in assessment and plan.    Medications: Reviewed in MAR  Current Facility-Administered Medications   Medication Dose Frequency Provider Last Rate Last Dose   • carvedilol (COREG) tablet 6.25 mg  6.25 mg BID WITH MEALS Marimar Santo M.D.   6.25 mg at 02/25/19 1009   • hydrOXYzine HCl (ATARAX) tablet 10 mg  10 mg Q8HRS PRN Alayna Mccallum M.D.       • enoxaparin (LOVENOX) inj 30 mg  30 mg DAILY Tucker Hawkins D.O.   30 mg at 02/25/19 1200   • nitroglycerin (NITROSTAT) tablet 0.4 mg  0.4 mg Q5 MIN PRN Donis Prescott M.D.   0.4 mg at 02/24/19 1459   • aspirin (ASA) tablet 325 mg  325 mg Once Donis Prescott M.D.       • senna-docusate (PERICOLACE or SENOKOT S) 8.6-50 MG per tablet 2 Tab  2 Tab BID Suri Gama M.D.   Stopped at 02/25/19 0522    And   • polyethylene glycol/lytes (MIRALAX) PACKET 1 Packet  1 Packet QDAY PRN Suri Gama M.D.        And   • magnesium hydroxide (MILK OF MAGNESIA) suspension 30 mL  30 mL " "QDAY PRN Suri Gama M.D.        And   • bisacodyl (DULCOLAX) suppository 10 mg  10 mg QDAY PRN Suri Gama M.D.       • Respiratory Care per Protocol   Continuous RT Suri Gama M.D.       • hydrALAZINE (APRESOLINE) tablet 10 mg  10 mg TID PRN Suri Gama M.D.   10 mg at 02/25/19 0413   • amLODIPine (NORVASC) tablet 10 mg  10 mg Q DAY Suri Gama M.D.   10 mg at 02/25/19 0537   • lisinopril (PRINIVIL) tablet 40 mg  40 mg Q DAY Suri Gama M.D.   40 mg at 02/25/19 0537   • aspirin EC (ECOTRIN) tablet 81 mg  81 mg DAILY Suri Gama M.D.   81 mg at 02/25/19 0537   • insulin lispro (HUMALOG) injection 1-6 Units  1-6 Units 4X/DAY ACHNIKOLE Gama M.D.   Stopped at 02/24/19 2030    And   • glucose 4 g chewable tablet 16 g  16 g Q15 MIN PRN Suri Gama M.D.        And   • dextrose 50% (D50W) injection 25 mL  25 mL Q15 MIN PRN Suri Gama M.D.       • sertraline (ZOLOFT) tablet 25 mg  25 mg DAILY Suri Gama M.D.   25 mg at 02/25/19 0538   • loratadine (CLARITIN) tablet 10 mg  10 mg DAILY Suri Gama M.D.   10 mg at 02/25/19 0536   • triamcinolone acetonide (KENALOG) 0.1 % ointment   BID Suri Gama M.D.       • hyoscyamine-maalox plus-lidocaine viscous (GI COCKTAIL) oral susp 15 mL  15 mL Q6HRS PRN Alayna Mccallum M.D.       • famotidine (PEPCID) tablet 20 mg  20 mg DAILY Alayna Mccallum M.D.       • atorvastatin (LIPITOR) tablet 80 mg  80 mg Q EVENING Alayna Mccallum M.D.       • acetaminophen (TYLENOL) tablet 650 mg  650 mg Q4HRS PRN Karson Montes De Oca M.D.         Last reviewed on 2/24/2019  3:48 PM by Joellen Barksdale  No Known Allergies    Physical Exam  Body mass index is 20.24 kg/m². Blood pressure 154/75, pulse (!) 55, temperature 37.1 °C (98.8 °F), temperature source Temporal, resp. rate 18, height 1.549 m (5' 1\"), weight 48.6 kg (107 lb 2.3 oz), SpO2 95 %, not currently breastfeeding. Vitals:    02/25/19 0532 02/25/19 0722 02/25/19 1004 02/25/19 1209 "   BP: (!) 163/74 155/66  154/75   Pulse:  (!) 57 71 (!) 55   Resp: 16 18  18   Temp:  36.5 °C (97.7 °F)  37.1 °C (98.8 °F)   TempSrc:  Temporal  Temporal   SpO2: 96% 94%  95%   Weight:       Height:        Oxygen Therapy:  Pulse Oximetry: 95 %, O2 (LPM): 0, FiO2%: 21 %, O2 Delivery: None (Room Air)    General: no apparent distress, rash all over her body  HEENT: NA, AT, conjunctiva normal no, JVD   Lungs: normal respiratory effort, CTAB  Heart: RRR, no murmurs, no rubs or gallops,   EXT: No edema. + pedal pulses. no cyanosis  Abdomen: soft, non tender, non distended  Neurological: A/O x 3. No focal sensory deficits  Skin: Warm extremities    Labs (personally reviewed and notable for):   Recent Results (from the past 24 hour(s))   Troponin    Collection Time: 02/24/19  2:32 PM   Result Value Ref Range    Troponin I 0.02 0.00 - 0.04 ng/mL   Btype Natriuretic Peptide    Collection Time: 02/24/19  2:32 PM   Result Value Ref Range    B Natriuretic Peptide 290 (H) 0 - 100 pg/mL   CBC with Differential    Collection Time: 02/24/19  2:32 PM   Result Value Ref Range    WBC 12.3 (H) 4.8 - 10.8 K/uL    RBC 4.75 4.20 - 5.40 M/uL    Hemoglobin 14.1 12.0 - 16.0 g/dL    Hematocrit 43.0 37.0 - 47.0 %    MCV 90.5 81.4 - 97.8 fL    MCH 29.7 27.0 - 33.0 pg    MCHC 32.8 (L) 33.6 - 35.0 g/dL    RDW 41.5 35.9 - 50.0 fL    Platelet Count 222 164 - 446 K/uL    MPV 11.2 9.0 - 12.9 fL    Neutrophils-Polys 74.00 (H) 44.00 - 72.00 %    Lymphocytes 16.30 (L) 22.00 - 41.00 %    Monocytes 4.40 0.00 - 13.40 %    Eosinophils 4.40 0.00 - 6.90 %    Basophils 0.60 0.00 - 1.80 %    Immature Granulocytes 0.30 0.00 - 0.90 %    Nucleated RBC 0.00 /100 WBC    Neutrophils (Absolute) 9.10 (H) 2.00 - 7.15 K/uL    Lymphs (Absolute) 2.01 1.00 - 4.80 K/uL    Monos (Absolute) 0.54 0.00 - 0.85 K/uL    Eos (Absolute) 0.54 (H) 0.00 - 0.51 K/uL    Baso (Absolute) 0.08 0.00 - 0.12 K/uL    Immature Granulocytes (abs) 0.04 0.00 - 0.11 K/uL    NRBC (Absolute) 0.00 K/uL    Complete Metabolic Panel (CMP)    Collection Time: 19  2:32 PM   Result Value Ref Range    Sodium 133 (L) 135 - 145 mmol/L    Potassium 3.7 3.6 - 5.5 mmol/L    Chloride 103 96 - 112 mmol/L    Co2 22 20 - 33 mmol/L    Anion Gap 8.0 0.0 - 11.9    Glucose 217 (H) 65 - 99 mg/dL    Bun 18 8 - 22 mg/dL    Creatinine 1.20 0.50 - 1.40 mg/dL    Calcium 9.9 8.5 - 10.5 mg/dL    AST(SGOT) 15 12 - 45 U/L    ALT(SGPT) 11 2 - 50 U/L    Alkaline Phosphatase 64 30 - 99 U/L    Total Bilirubin 0.4 0.1 - 1.5 mg/dL    Albumin 4.5 3.2 - 4.9 g/dL    Total Protein 7.5 6.0 - 8.2 g/dL    Globulin 3.0 1.9 - 3.5 g/dL    A-G Ratio 1.5 g/dL   Prothrombin Time    Collection Time: 19  2:32 PM   Result Value Ref Range    PT 12.5 12.0 - 14.6 sec    INR 0.92 0.87 - 1.13   APTT    Collection Time: 19  2:32 PM   Result Value Ref Range    APTT 25.7 24.7 - 36.0 sec   Lipase    Collection Time: 19  2:32 PM   Result Value Ref Range    Lipase 17 11 - 82 U/L   ESTIMATED GFR    Collection Time: 19  2:32 PM   Result Value Ref Range    GFR If  54 (A) >60 mL/min/1.73 m 2    GFR If Non  44 (A) >60 mL/min/1.73 m 2   EKG    Collection Time: 19  2:37 PM   Result Value Ref Range    Report       [facility] Emergency Dept.    Test Date:  2019  Pt Name:    MICHELINE SILVA                  Department: ER  MRN:        [id number]                      Room:        13  Gender:     Female                       Technician: [id number]  :        1945                   Requested By:ER TRIAGE PROTOCOL  Order #:    [id number]                    Reading MD:    Measurements  Intervals                                Axis  Rate:       73                           P:          27  IN:         152                          QRS:        48  QRSD:       90                           T:          214  QT:         416  QTc:        459    Interpretive Statements  SINUS RHYTHM  LVH WITH SECONDARY  REPOLARIZATION ABNORMALITY  REPOL ABNRM, PROBABLE ISCHEMIA, ANT-LAT LEADS  Compared to ECG 2015 15:58:29  Sinus tachycardia no longer present  Sinus bradycardia no longer present  Possible ischemia still present     EKG    Collection Time: 19  5:20 PM   Result Value Ref Range    Report       [facility]    Test Date:  2019  Pt Name:    MICHELINE SILVA                  Department: CPU  MRN:        [id number]                      Room:       T214  Gender:     Female                       Technician: CELINE  :        1945                   Requested By:ANTIONETTE PANDA  Order #:    [id number]                    Reading MD: Michaela Garcia MD    Measurements  Intervals                                Axis  Rate:       69                           P:          42  LA:         152                          QRS:        51  QRSD:       94                           T:          237  QT:         472  QTc:        506    Interpretive Statements  SINUS RHYTHM  LVH WITH SECONDARY REPOLARIZATION ABNORMALITY  REPOL ABNRM, PROBABLE ISCHEMIA, DIFFUSE LEADS  BORDERLINE PROLONGED QT INTERVAL  Compared to ECG 2019 14:37:11  No significant changes    Electronically Signed On 2019 18:54:28 PST by Michaela Garcia MD     ACCU-CHEK GLUCOSE    Collection Time: 19  5:51 PM   Result Value Ref Range    Glucose - Accu-Ck 120 (H) 65 - 99 mg/dL   ACCU-CHEK GLUCOSE    Collection Time: 19  8:14 PM   Result Value Ref Range    Glucose - Accu-Ck 142 (H) 65 - 99 mg/dL   TROPONIN    Collection Time: 19  8:30 PM   Result Value Ref Range    Troponin I 0.05 (H) 0.00 - 0.04 ng/mL   TROPONIN    Collection Time: 19  2:51 AM   Result Value Ref Range    Troponin I 0.40 (H) 0.00 - 0.04 ng/mL   CBC WITHOUT DIFFERENTIAL    Collection Time: 19  2:51 AM   Result Value Ref Range    WBC 11.6 (H) 4.8 - 10.8 K/uL    RBC 4.67 4.20 - 5.40 M/uL    Hemoglobin 14.0 12.0 - 16.0 g/dL    Hematocrit 42.1 37.0 - 47.0 %    MCV 90.1  81.4 - 97.8 fL    MCH 30.0 27.0 - 33.0 pg    MCHC 33.3 (L) 33.6 - 35.0 g/dL    RDW 42.7 35.9 - 50.0 fL    Platelet Count 224 164 - 446 K/uL    MPV 10.9 9.0 - 12.9 fL   Comp Metabolic Panel    Collection Time: 19  2:51 AM   Result Value Ref Range    Sodium 136 135 - 145 mmol/L    Potassium 4.0 3.6 - 5.5 mmol/L    Chloride 105 96 - 112 mmol/L    Co2 23 20 - 33 mmol/L    Anion Gap 8.0 0.0 - 11.9    Glucose 138 (H) 65 - 99 mg/dL    Bun 19 8 - 22 mg/dL    Creatinine 0.88 0.50 - 1.40 mg/dL    Calcium 9.8 8.5 - 10.5 mg/dL    AST(SGOT) 16 12 - 45 U/L    ALT(SGPT) 10 2 - 50 U/L    Alkaline Phosphatase 59 30 - 99 U/L    Total Bilirubin 0.6 0.1 - 1.5 mg/dL    Albumin 4.2 3.2 - 4.9 g/dL    Total Protein 8.0 6.0 - 8.2 g/dL    Globulin 3.8 (H) 1.9 - 3.5 g/dL    A-G Ratio 1.1 g/dL   MAGNESIUM    Collection Time: 19  2:51 AM   Result Value Ref Range    Magnesium 2.1 1.5 - 2.5 mg/dL   HEMOGLOBIN A1C    Collection Time: 19  2:51 AM   Result Value Ref Range    Glycohemoglobin 6.9 (H) 0.0 - 5.6 %    Est Avg Glucose 151 mg/dL   TSH WITH REFLEX TO FT4    Collection Time: 19  2:51 AM   Result Value Ref Range    TSH 2.090 0.380 - 5.330 uIU/mL   Lipid Profile    Collection Time: 19  2:51 AM   Result Value Ref Range    Cholesterol,Tot 256 (H) 100 - 199 mg/dL    Triglycerides 221 (H) 0 - 149 mg/dL    HDL 59 >=40 mg/dL     (H) <100 mg/dL   ESTIMATED GFR    Collection Time: 19  2:51 AM   Result Value Ref Range    GFR If African American >60 >60 mL/min/1.73 m 2    GFR If Non African American >60 >60 mL/min/1.73 m 2   EKG    Collection Time: 19  4:14 AM   Result Value Ref Range    Report       [facility]    Test Date:  2019  Pt Name:    MICHELINE SILVA                  Department: [facility]  MRN:        [id number]                      Room:       [facility]  Gender:     Female                       Technician: THOMAS  :        1945                   Requested By:LAUREN ZAMORA  Order #:     [id number]                    Reading MD: Michaela Garcia MD    Measurements  Intervals                                Axis  Rate:       69                           P:          31  CO:         132                          QRS:        50  QRSD:       92                           T:          214  QT:         448  QTc:        480    Interpretive Statements  SINUS RHYTHM  LVH WITH SECONDARY REPOLARIZATION ABNORMALITY  REPOL ABNRM, PROBABLE ISCHEMIA, DIFFUSE LEADS  Compared to ECG 02/24/2019 17:20:19  No significant changes    Electronically Signed On 2- 6:31:03 PST by Michaela Garcia MD     ACCU-CHEK GLUCOSE    Collection Time: 02/25/19  5:33 AM   Result Value Ref Range    Glucose - Accu-Ck 118 (H) 65 - 99 mg/dL   TROPONIN    Collection Time: 02/25/19 10:24 AM   Result Value Ref Range    Troponin I 0.28 (H) 0.00 - 0.04 ng/mL   ACCU-CHEK GLUCOSE    Collection Time: 02/25/19 11:40 AM   Result Value Ref Range    Glucose - Accu-Ck 127 (H) 65 - 99 mg/dL       Cardiac Imaging and Procedures Review:    EKG and telemetry tracings personally reviewed      ASSESSMENT & PLAN    # Chest pain- atypical per history  # Elevated troponin - trending down  # HTN- uncontrolled likely 2/2 non compliance  # HLD-  ASCVD 7.7% 10 year CV risk  # DMII- A1c 6.9  # Rash  # Anxiety    - Patient declined any coronary interventions  - Continue medical management with ASA, high intensity statin, carvedilol and lisinopril  - SBP high- primary team to continue to monitor and adjust medications- discontinued metoprolol and started carvedilol 6.25 bid with hold parameters  - F/u echocardiogram- pending  - Patient not oblivious to the severity of her diagnosis- likely 2/2 baseline anxiety vs encephalopathy from high BP readings  - Would recommend primary team to consider psychiatry consultation if confusion persists after stabilizing BP.    It is my pleasure to participate in the care of Ms. Madison.  Please do not hesitate to contact me with questions or  concerns.

## 2019-02-25 NOTE — PROGRESS NOTES
Pt a&o x4. On RA. Respirations equal and unlabored. Denies pain  on NPRS scale.  Ambulatory with steady gait. Repositions self in bed. SR/ST  on tele monitor. ST depression noted on EKG earlier today, per report, MD aware. Good po intake without any s/sx of aspiration noted. VIDYA.  Has multiple rash, redness noted on arms, legs, pfan area, under the breast and face. Most of these rashes/redness appears to be round and itchy. Cream applied by MD. Plan of care reviewed including: UA, labs, skin care, FSBS and fall precautions. Verbalized understanding and agrees. White board updated. Instructed to use call light prior to getting oob to prevent falls. Able to make needs known.  Call light within reach.

## 2019-02-25 NOTE — PROGRESS NOTES
Pt arrived to unit. Placed on tele monitor, NSR, monitor room notified. Pt AOX4, denies pain at this time.

## 2019-02-25 NOTE — PROGRESS NOTES
· 2 RN skin check complete with NYASIA Estevez.  · Devices in place N/A.  · Skin assessed under devices N/A.  · Confirmed pressure ulcers found on N/A.  · New potential pressure ulcers noted on N/A. Wound consult placed and wound reported.  · The following interventions in place: Pt ambulates independently in room, kenalog cream used for rash    Pt has rash throughout body, kenalog cream used BID. Otherwise skin is grossly intact.

## 2019-02-25 NOTE — PROGRESS NOTES
Transported pt from ER B13 to T214 via wheelchair.  Pt's weight obtained on standup scale, see flow sheet.  Pt ambulated from wheelchair to hospital bed, gait slightly unsteady.  Pt's friend at bedside.  Pt and friend oriented to room and unit.

## 2019-02-25 NOTE — RESPIRATORY CARE
COPD EDUCATION by COPD CLINICAL EDUCATOR  2/25/2019 at 8:39 AM by Radha Rmoo     Patient reviewed by COPD education team. Patient does not qualify for COPD program.

## 2019-02-25 NOTE — ASSESSMENT & PLAN NOTE
Patient presented with midepigastric chest pain/discomfort, on and off for last couple of weeks.  On admission initial troponin negative with EKG findings of ST depression and some T wave changes in the anteroseptal leads. Patient was noted to have troponin elevation. Chest pain resolved with blood pressure control.   - Famotidine prn for heartburn   - Continue to monitor

## 2019-02-25 NOTE — SENIOR ADMIT NOTE
"Mr. Madison is a 73 y.o. female with PMHx of uncontrolled hypertension, intracerebral hemorrhage in 2005, depression, diabetes mellitus, h/o noncompliance was brought in by her friend as she was running high blood pressure since last few visits and she did not take her medications.  Patient is alert and oriented x4 however very tangential talker and does not give straight answers to any question.  Her friend thinks that she is confused today.  She did not give any straightforward answer for chest pain.  During the conversation, she complained of chest pain-in the epigastric region twice which lasted only a few seconds, \"feels just like pain\", nonradiating.  She was sitting in the bed comfortably when she got this pain. She repeatedly stated that she feels \"excited\" especially when her friend talks about her health and starts getting this pain.  When she stood up, she felt dizzy (could not check her blood pressure due to acuity of situation). No heartburn, headache, blurry vision, diarrhea, constipation. Per chart review no previous history of MI.     In ER: Vitals HR 77, RR 19, /93, 96% on RA  Labs: WBC 12.3, sodium 133, K3.7, CO2 22, normal anion gap, BUN 18, creatinine 1.2, glucose 217, , troponin 0.02.  Chest x-ray showed cardiomegaly  EKG showed normal axis, T wave inversion in 2 aVL, aVF.  ST E in V1, STD in V4 V5 V6. SV1+RV5 =40.   ER physician contacted Dr. Craig (cardiologist) for concern of STEMI.  It was decided that it is left heart strain by cardiologist and formal consult was not indicated at that time.  She was given 1 dose of hydralazine 10 mg IV which decreased her SBP transiently to 160.    Exam:  BP (!) 181/82   Pulse 76   Temp 36.8 °C (98.3 °F) (Temporal)   Resp 19   Ht 1.549 m (5' 1\")   Wt 48.6 kg (107 lb 2.3 oz)   SpO2 97%   Breastfeeding? No   BMI 20.24 kg/m²     Physical exam:   General: Elderly lady, not in acute distress  HEENT: NC/AT. PERRLA, EOMI. moist mucous " membranes. No lymphadenopathy.  CVS: RRR, S1-S2 WNL, systolic murmur in aortic and pulmonary area.  RS: No tenderness on palpation. CTA, good air entry. No wheezes or ronchi.  Abdomen: Soft, NT/ND, BS +. No organomegaly  Ext: No pedal edema  Skin: Hypopigmented ring shaped lesions with raised edges and a keratinized skin in bilateral legs, dorsal surface of hands, forearms, face noticed.   Neuro: CN 2-12 wnl. Motor and sensory exam wnl.    On  Labs:  Recent Labs      02/24/19   1432   SODIUM  133*   POTASSIUM  3.7   CHLORIDE  103   CO2  22   BUN  18   CREATININE  1.20   CALCIUM  9.9       Recent Labs      02/24/19   1432   ALTSGPT  11   ASTSGOT  15   ALKPHOSPHAT  64   TBILIRUBIN  0.4   LIPASE  17   GLUCOSE  217*       Recent Labs      02/24/19   1432   RBC  4.75   HEMOGLOBIN  14.1   HEMATOCRIT  43.0   PLATELETCT  222   PROTHROMBTM  12.5   APTT  25.7   INR  0.92       Recent Labs      02/24/19   1432   WBC  12.3*   NEUTSPOLYS  74.00*   LYMPHOCYTES  16.30*   MONOCYTES  4.40   EOSINOPHILS  4.40   BASOPHILS  0.60   ASTSGOT  15   ALTSGPT  11   ALKPHOSPHAT  64   TBILIRUBIN  0.4         DX-CHEST-LIMITED (1 VIEW)   Final Result         Enlarged cardiac silhouette without evidence of acute disease.      EC-ECHOCARDIOGRAM COMPLETE W/O CONT    (Results Pending)       Assessment and Plan:  Hypertensive emergency vs urgency: Per chart review, she has uncontrolled hypertension since last many years which even resulted in intracranial hemorrhage in 2005.  She lives alone and does not take her medications as prescribed.  She is on amlodipine 10, lisinopril 40 and HCTZ 12.5 which she is not taking.  Chest pain is very atypical however it has to be taken into consideration which makes hypertensive emergency a possible working diagnosis.  -Goal reduction in blood pressure is <20% in 24 hours given that she has been hypertensive for a long time.  Resumed home lisinopril and amlodipine.  If SBP>180 or DBP>120, ordered as needed  hydralazine 10 mg oral.    Atypical chest pain: Patient is extremely poor historian and the presentation of chest pain(during our conversation) looked very atypical however given that she is old female with diabetes, ACS cannot be ruled out.  Troponin was WNL and EKG showed a left heart strain.  It could be due to uncontrolled hypertension, GERD, or anxiety.   -Repeat troponin in 6 hours with EKG, will trend further troponins per the result  -Ordered echo  -Ordered stat aspirin 325, changed home atorvastatin 20 mg to 80 mg.  Started metoprolol 12.5 mg.  Will de-escalate/stop certain medications if ACS is ruled out.  -Ordered GI cocktail as needed and scheduled famotidine.  PRN hydralazine and continue home sertraline.     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  It is an unusual place to have ring worm infestation.  There is questionable bedbug infestation at home however the lesions does not look due to bug bites.    Hypokalemia: 3.7.KCL 40meq once.     Leucocytosis: SIRS 2/4 but no source of infection. Not sure if she had urine symptoms. No cough, no consolidation in CXRs. No diarrhea. Ordered UA.     Living situation: Lives alone, no children,   a few years ago.  She has few siblings in [place] but mostly relies on his friend in [place].    Other chronic medical problems  History of diabetes: On metformin 500 mg at, noncompliant ordered A1c, ISS, hypoglycemia protocol and diabetic diet.  History of depression: Resumed home sertraline.  Ordered as needed hydralazine for anxiety.    FULL CODE    For further details , please refer to H&P by .

## 2019-02-26 ENCOUNTER — APPOINTMENT (OUTPATIENT)
Dept: CARDIOLOGY | Facility: MEDICAL CENTER | Age: 74
DRG: 305 | End: 2019-02-26
Attending: STUDENT IN AN ORGANIZED HEALTH CARE EDUCATION/TRAINING PROGRAM
Payer: MEDICARE

## 2019-02-26 ENCOUNTER — PATIENT OUTREACH (OUTPATIENT)
Dept: HEALTH INFORMATION MANAGEMENT | Facility: OTHER | Age: 74
End: 2019-02-26

## 2019-02-26 VITALS
TEMPERATURE: 98.6 F | HEIGHT: 61 IN | BODY MASS INDEX: 20.27 KG/M2 | SYSTOLIC BLOOD PRESSURE: 129 MMHG | OXYGEN SATURATION: 95 % | HEART RATE: 65 BPM | DIASTOLIC BLOOD PRESSURE: 57 MMHG | RESPIRATION RATE: 17 BRPM | WEIGHT: 107.36 LBS

## 2019-02-26 PROBLEM — D72.829 LEUCOCYTOSIS: Status: RESOLVED | Noted: 2019-02-24 | Resolved: 2019-02-26

## 2019-02-26 PROBLEM — R79.89 ELEVATED TROPONIN: Status: RESOLVED | Noted: 2019-02-25 | Resolved: 2019-02-26

## 2019-02-26 PROBLEM — N17.9 ACUTE KIDNEY INJURY (HCC): Status: RESOLVED | Noted: 2019-02-25 | Resolved: 2019-02-26

## 2019-02-26 PROBLEM — I49.5 TACHYCARDIA-BRADYCARDIA SYNDROME (HCC): Status: ACTIVE | Noted: 2019-02-26

## 2019-02-26 PROBLEM — I16.1 HYPERTENSIVE EMERGENCY: Status: RESOLVED | Noted: 2019-02-24 | Resolved: 2019-02-26

## 2019-02-26 PROBLEM — I35.0 AORTIC STENOSIS: Status: ACTIVE | Noted: 2019-02-25

## 2019-02-26 LAB
ANION GAP SERPL CALC-SCNC: 9 MMOL/L (ref 0–11.9)
BASOPHILS # BLD AUTO: 0.7 % (ref 0–1.8)
BASOPHILS # BLD: 0.07 K/UL (ref 0–0.12)
BUN SERPL-MCNC: 34 MG/DL (ref 8–22)
CALCIUM SERPL-MCNC: 9.9 MG/DL (ref 8.5–10.5)
CHLORIDE SERPL-SCNC: 104 MMOL/L (ref 96–112)
CO2 SERPL-SCNC: 22 MMOL/L (ref 20–33)
CREAT SERPL-MCNC: 1.14 MG/DL (ref 0.5–1.4)
EOSINOPHIL # BLD AUTO: 0.7 K/UL (ref 0–0.51)
EOSINOPHIL NFR BLD: 7.2 % (ref 0–6.9)
ERYTHROCYTE [DISTWIDTH] IN BLOOD BY AUTOMATED COUNT: 42.9 FL (ref 35.9–50)
GLUCOSE BLD-MCNC: 121 MG/DL (ref 65–99)
GLUCOSE BLD-MCNC: 129 MG/DL (ref 65–99)
GLUCOSE BLD-MCNC: 140 MG/DL (ref 65–99)
GLUCOSE SERPL-MCNC: 110 MG/DL (ref 65–99)
HCT VFR BLD AUTO: 40.7 % (ref 37–47)
HGB BLD-MCNC: 13.3 G/DL (ref 12–16)
IMM GRANULOCYTES # BLD AUTO: 0.01 K/UL (ref 0–0.11)
IMM GRANULOCYTES NFR BLD AUTO: 0.1 % (ref 0–0.9)
LV EJECT FRACT  99904: 75
LV EJECT FRACT MOD 2C 99903: 77.89
LV EJECT FRACT MOD 4C 99902: 74.12
LV EJECT FRACT MOD BP 99901: 74.76
LYMPHOCYTES # BLD AUTO: 4.21 K/UL (ref 1–4.8)
LYMPHOCYTES NFR BLD: 43.6 % (ref 22–41)
MAGNESIUM SERPL-MCNC: 2.2 MG/DL (ref 1.5–2.5)
MCH RBC QN AUTO: 29.6 PG (ref 27–33)
MCHC RBC AUTO-ENTMCNC: 32.7 G/DL (ref 33.6–35)
MCV RBC AUTO: 90.6 FL (ref 81.4–97.8)
MONOCYTES # BLD AUTO: 0.6 K/UL (ref 0–0.85)
MONOCYTES NFR BLD AUTO: 6.2 % (ref 0–13.4)
NEUTROPHILS # BLD AUTO: 4.07 K/UL (ref 2–7.15)
NEUTROPHILS NFR BLD: 42.2 % (ref 44–72)
NRBC # BLD AUTO: 0 K/UL
NRBC BLD-RTO: 0 /100 WBC
PLATELET # BLD AUTO: 210 K/UL (ref 164–446)
PMV BLD AUTO: 10.8 FL (ref 9–12.9)
POTASSIUM SERPL-SCNC: 3.8 MMOL/L (ref 3.6–5.5)
RBC # BLD AUTO: 4.49 M/UL (ref 4.2–5.4)
SODIUM SERPL-SCNC: 135 MMOL/L (ref 135–145)
WBC # BLD AUTO: 9.7 K/UL (ref 4.8–10.8)

## 2019-02-26 PROCEDURE — 700102 HCHG RX REV CODE 250 W/ 637 OVERRIDE(OP): Performed by: INTERNAL MEDICINE

## 2019-02-26 PROCEDURE — 700102 HCHG RX REV CODE 250 W/ 637 OVERRIDE(OP): Performed by: STUDENT IN AN ORGANIZED HEALTH CARE EDUCATION/TRAINING PROGRAM

## 2019-02-26 PROCEDURE — 3E02340 INTRODUCTION OF INFLUENZA VACCINE INTO MUSCLE, PERCUTANEOUS APPROACH: ICD-10-PCS | Performed by: INTERNAL MEDICINE

## 2019-02-26 PROCEDURE — 36415 COLL VENOUS BLD VENIPUNCTURE: CPT

## 2019-02-26 PROCEDURE — 90471 IMMUNIZATION ADMIN: CPT

## 2019-02-26 PROCEDURE — 90662 IIV NO PRSV INCREASED AG IM: CPT | Performed by: INTERNAL MEDICINE

## 2019-02-26 PROCEDURE — 93306 TTE W/DOPPLER COMPLETE: CPT

## 2019-02-26 PROCEDURE — A9270 NON-COVERED ITEM OR SERVICE: HCPCS | Performed by: STUDENT IN AN ORGANIZED HEALTH CARE EDUCATION/TRAINING PROGRAM

## 2019-02-26 PROCEDURE — 82962 GLUCOSE BLOOD TEST: CPT

## 2019-02-26 PROCEDURE — A9270 NON-COVERED ITEM OR SERVICE: HCPCS | Performed by: INTERNAL MEDICINE

## 2019-02-26 PROCEDURE — 80048 BASIC METABOLIC PNL TOTAL CA: CPT

## 2019-02-26 PROCEDURE — 85025 COMPLETE CBC W/AUTO DIFF WBC: CPT

## 2019-02-26 PROCEDURE — 90670 PCV13 VACCINE IM: CPT | Performed by: STUDENT IN AN ORGANIZED HEALTH CARE EDUCATION/TRAINING PROGRAM

## 2019-02-26 PROCEDURE — 700111 HCHG RX REV CODE 636 W/ 250 OVERRIDE (IP): Performed by: INTERNAL MEDICINE

## 2019-02-26 PROCEDURE — 83735 ASSAY OF MAGNESIUM: CPT

## 2019-02-26 PROCEDURE — 99232 SBSQ HOSP IP/OBS MODERATE 35: CPT | Mod: GC | Performed by: INTERNAL MEDICINE

## 2019-02-26 PROCEDURE — 700111 HCHG RX REV CODE 636 W/ 250 OVERRIDE (IP): Performed by: STUDENT IN AN ORGANIZED HEALTH CARE EDUCATION/TRAINING PROGRAM

## 2019-02-26 PROCEDURE — 93306 TTE W/DOPPLER COMPLETE: CPT | Mod: 26 | Performed by: INTERNAL MEDICINE

## 2019-02-26 PROCEDURE — 99238 HOSP IP/OBS DSCHRG MGMT 30/<: CPT | Mod: GC | Performed by: INTERNAL MEDICINE

## 2019-02-26 RX ORDER — METOPROLOL SUCCINATE 25 MG/1
25 TABLET, EXTENDED RELEASE ORAL DAILY
Qty: 30 TAB | Refills: 1 | Status: SHIPPED | OUTPATIENT
Start: 2019-02-27 | End: 2023-03-24

## 2019-02-26 RX ORDER — HYDROXYZINE HYDROCHLORIDE 10 MG/1
10 TABLET, FILM COATED ORAL EVERY 8 HOURS PRN
Qty: 30 TAB | Refills: 0 | Status: SHIPPED | OUTPATIENT
Start: 2019-02-26 | End: 2023-03-24

## 2019-02-26 RX ORDER — FAMOTIDINE 20 MG/1
20 TABLET, FILM COATED ORAL
Qty: 30 TAB | Refills: 0 | Status: SHIPPED | OUTPATIENT
Start: 2019-02-26 | End: 2023-03-24

## 2019-02-26 RX ORDER — AMLODIPINE BESYLATE 10 MG/1
10 TABLET ORAL DAILY
Qty: 30 TAB | Refills: 1 | Status: SHIPPED | OUTPATIENT
Start: 2019-02-27 | End: 2023-03-24

## 2019-02-26 RX ORDER — TRIAMCINOLONE ACETONIDE 1 MG/G
1 OINTMENT TOPICAL 2 TIMES DAILY
Qty: 60 G | Refills: 0 | Status: SHIPPED | OUTPATIENT
Start: 2019-02-26 | End: 2023-03-24

## 2019-02-26 RX ORDER — ATORVASTATIN CALCIUM 40 MG/1
40 TABLET, FILM COATED ORAL EVERY EVENING
Qty: 30 TAB | Refills: 1 | Status: SHIPPED | OUTPATIENT
Start: 2019-02-26 | End: 2023-03-24

## 2019-02-26 RX ORDER — METOPROLOL SUCCINATE 25 MG/1
25 TABLET, EXTENDED RELEASE ORAL
Status: DISCONTINUED | OUTPATIENT
Start: 2019-02-26 | End: 2019-02-26 | Stop reason: HOSPADM

## 2019-02-26 RX ORDER — DIGOXIN 125 MCG
125 TABLET ORAL DAILY
Qty: 30 TAB | Refills: 1 | Status: SHIPPED | OUTPATIENT
Start: 2019-02-26 | End: 2023-03-24

## 2019-02-26 RX ORDER — ASPIRIN 81 MG/1
81 TABLET ORAL DAILY
Qty: 30 TAB | Refills: 1 | Status: SHIPPED | OUTPATIENT
Start: 2019-02-27 | End: 2023-03-24

## 2019-02-26 RX ORDER — DIGOXIN 125 MCG
125 TABLET ORAL DAILY
Status: DISCONTINUED | OUTPATIENT
Start: 2019-02-26 | End: 2019-02-26 | Stop reason: HOSPADM

## 2019-02-26 RX ORDER — SERTRALINE HYDROCHLORIDE 25 MG/1
25 TABLET, FILM COATED ORAL DAILY
Qty: 30 TAB | Refills: 1 | Status: SHIPPED | OUTPATIENT
Start: 2019-02-27 | End: 2023-03-24

## 2019-02-26 RX ADMIN — ENOXAPARIN SODIUM 30 MG: 100 INJECTION SUBCUTANEOUS at 05:01

## 2019-02-26 RX ADMIN — SERTRALINE 25 MG: 50 TABLET, FILM COATED ORAL at 05:01

## 2019-02-26 RX ADMIN — TRIAMCINOLONE ACETONIDE: 1 OINTMENT TOPICAL at 05:00

## 2019-02-26 RX ADMIN — AMLODIPINE BESYLATE 10 MG: 10 TABLET ORAL at 05:02

## 2019-02-26 RX ADMIN — LISINOPRIL 40 MG: 20 TABLET ORAL at 06:00

## 2019-02-26 RX ADMIN — INFLUENZA A VIRUS A/MICHIGAN/45/2015 X-275 (H1N1) ANTIGEN (FORMALDEHYDE INACTIVATED), INFLUENZA A VIRUS A/SINGAPORE/INFIMH-16-0019/2016 IVR-186 (H3N2) ANTIGEN (FORMALDEHYDE INACTIVATED), AND INFLUENZA B VIRUS B/MARYLAND/15/2016 BX-69A (A B/COLORADO/6/2017-LIKE VIRUS) ANTIGEN (FORMALDEHYDE INACTIVATED) 0.5 ML: 60; 60; 60 INJECTION, SUSPENSION INTRAMUSCULAR at 15:08

## 2019-02-26 RX ADMIN — METOPROLOL SUCCINATE 25 MG: 25 TABLET, EXTENDED RELEASE ORAL at 12:30

## 2019-02-26 RX ADMIN — ASPIRIN 81 MG: 81 TABLET, COATED ORAL at 05:02

## 2019-02-26 RX ADMIN — PNEUMOCOCCAL 13-VALENT CONJUGATE VACCINE 0.5 ML: 2.2; 2.2; 2.2; 2.2; 2.2; 4.4; 2.2; 2.2; 2.2; 2.2; 2.2; 2.2; 2.2 INJECTION, SUSPENSION INTRAMUSCULAR at 15:09

## 2019-02-26 RX ADMIN — FAMOTIDINE 20 MG: 20 TABLET ORAL at 05:02

## 2019-02-26 ASSESSMENT — ENCOUNTER SYMPTOMS
ABDOMINAL PAIN: 0
TINGLING: 0
ORTHOPNEA: 0
PALPITATIONS: 0
DOUBLE VISION: 0
DIARRHEA: 0
BLURRED VISION: 0
SHORTNESS OF BREATH: 0
CONSTIPATION: 0
NAUSEA: 0
FEVER: 0
CHILLS: 0
HEADACHES: 0
SPUTUM PRODUCTION: 0
VOMITING: 0
TREMORS: 0
COUGH: 0
SORE THROAT: 0
WHEEZING: 0
DIZZINESS: 0

## 2019-02-26 NOTE — PROGRESS NOTES
[facility] [place] Cardiology Follow-up Consult Note    Date of note:    2/25/2019      Consulting Physician: No att. providers found      Chief Complaint   Patient presents with   • Chest Pain       Interim Events:  Had a run of SVT last night with HR to 170s and then HR down to the 40s  Started low dose digoxin 0.125 od for tachy camilo syndrome  Discontinued coreg  Started toprol 25 od for compliance issues  Echocardiogram showed an EF of 75%, moderate LVH, RSVP 40  Do not recommend plavix due to non compliance     ROS  No NV, No Bleeding, No dizziness   All other review of systems reviewed and negative.    Past medical, surgical, social, and family history reviewed and unchanged from admission except as noted in assessment and plan.    Medications: Reviewed in MAR  Current Facility-Administered Medications   Medication Dose Frequency Provider Last Rate Last Dose   • hydrOXYzine HCl (ATARAX) tablet 10 mg  10 mg Q8HRS PRN Alayna Mccallum M.D.       • enoxaparin (LOVENOX) inj 30 mg  30 mg DAILY Tucker Hawkins D.ONara   30 mg at 02/26/19 0501   • carvedilol (COREG) tablet 3.125 mg  3.125 mg BID WITH MEALS Alayna Mccallum M.D.   Stopped at 02/26/19 0730   • nitroglycerin (NITROSTAT) tablet 0.4 mg  0.4 mg Q5 MIN PRN Donis Prescott M.D.   0.4 mg at 02/24/19 1459   • senna-docusate (PERICOLACE or SENOKOT S) 8.6-50 MG per tablet 2 Tab  2 Tab BID Suri Gama M.D.   Stopped at 02/25/19 0522    And   • polyethylene glycol/lytes (MIRALAX) PACKET 1 Packet  1 Packet QDAY PRN Suri Gama M.D.        And   • magnesium hydroxide (MILK OF MAGNESIA) suspension 30 mL  30 mL QDAY PRN Suri Gama M.D.        And   • bisacodyl (DULCOLAX) suppository 10 mg  10 mg QDAY PRN Suri Gama M.D.       • Respiratory Care per Protocol   Continuous RT Suri Gama M.D.       • hydrALAZINE (APRESOLINE) tablet 10 mg  10 mg TID PRN Suri Gama M.D.   10 mg at 02/25/19 0413   • amLODIPine (NORVASC) tablet 10 mg  10 mg Q DAY Suri Gama,  "M.D.   10 mg at 02/26/19 0502   • lisinopril (PRINIVIL) tablet 40 mg  40 mg Q DAY Suri Gama M.D.   40 mg at 02/26/19 0600   • aspirin EC (ECOTRIN) tablet 81 mg  81 mg DAILY Suri Gama M.D.   81 mg at 02/26/19 0502   • insulin lispro (HUMALOG) injection 1-6 Units  1-6 Units 4X/DAY ACHNIKOLE Gama M.D.   Stopped at 02/24/19 2030    And   • glucose 4 g chewable tablet 16 g  16 g Q15 MIN PRN Suri Gama M.D.        And   • dextrose 50% (D50W) injection 25 mL  25 mL Q15 MIN PRN Suri Gama M.D.       • sertraline (ZOLOFT) tablet 25 mg  25 mg DAILY Suri Gama M.D.   25 mg at 02/26/19 0501   • triamcinolone acetonide (KENALOG) 0.1 % ointment   BID Suri Gama M.D.       • hyoscyamine-maalox plus-lidocaine viscous (GI COCKTAIL) oral susp 15 mL  15 mL Q6HRS PRN Alayna Mccallum M.D.       • famotidine (PEPCID) tablet 20 mg  20 mg DAILY Alayna Mccallum M.D.   20 mg at 02/26/19 0502   • atorvastatin (LIPITOR) tablet 80 mg  80 mg Q EVENING Alayna Mccallum M.D.   80 mg at 02/25/19 1736   • acetaminophen (TYLENOL) tablet 650 mg  650 mg Q4HRS PRN Karson Montes De Oca M.D.         Last reviewed on 2/24/2019  3:48 PM by Joellen Barksdale  No Known Allergies    Physical Exam  Body mass index is 20.29 kg/m². Blood pressure 117/76, pulse 71, temperature 36.2 °C (97.1 °F), temperature source Temporal, resp. rate 17, height 1.549 m (5' 1\"), weight 48.7 kg (107 lb 5.8 oz), SpO2 94 %, not currently breastfeeding.   Vitals:    02/25/19 2045 02/25/19 2340 02/26/19 0330 02/26/19 0801   BP: 154/80 152/78 143/73 117/76   Pulse: 60 63 65 71   Resp: 18 17 17 17   Temp: 36.9 °C (98.4 °F) 37.2 °C (98.9 °F) 36.8 °C (98.3 °F) 36.2 °C (97.1 °F)   TempSrc: Temporal Temporal Temporal Temporal   SpO2: 95% 94% 94% 94%   Weight: 48.7 kg (107 lb 5.8 oz)      Height:        Oxygen Therapy:  Pulse Oximetry: 94 %, O2 (LPM): 0, O2 Delivery: None (Room Air)    General: no apparent distress, rash all over her body  HEENT: NA, AT, " conjunctiva normal no, JVD   Lungs: normal respiratory effort, CTAB  Heart: systolic murmur heard   EXT: No edema. + pedal pulses. no cyanosis  Abdomen: soft, non tender, non distended  Neurological: A/O x 3. No focal sensory deficits  Skin: Warm extremities    Labs (personally reviewed and notable for):   Recent Results (from the past 24 hour(s))   ACCU-CHEK GLUCOSE    Collection Time: 02/25/19 11:40 AM   Result Value Ref Range    Glucose - Accu-Ck 127 (H) 65 - 99 mg/dL   ACCU-CHEK GLUCOSE    Collection Time: 02/25/19  4:24 PM   Result Value Ref Range    Glucose - Accu-Ck 90 65 - 99 mg/dL   ACCU-CHEK GLUCOSE    Collection Time: 02/25/19  7:45 PM   Result Value Ref Range    Glucose - Accu-Ck 140 (H) 65 - 99 mg/dL   CBC WITH DIFFERENTIAL    Collection Time: 02/26/19 12:23 AM   Result Value Ref Range    WBC 9.7 4.8 - 10.8 K/uL    RBC 4.49 4.20 - 5.40 M/uL    Hemoglobin 13.3 12.0 - 16.0 g/dL    Hematocrit 40.7 37.0 - 47.0 %    MCV 90.6 81.4 - 97.8 fL    MCH 29.6 27.0 - 33.0 pg    MCHC 32.7 (L) 33.6 - 35.0 g/dL    RDW 42.9 35.9 - 50.0 fL    Platelet Count 210 164 - 446 K/uL    MPV 10.8 9.0 - 12.9 fL    Neutrophils-Polys 42.20 (L) 44.00 - 72.00 %    Lymphocytes 43.60 (H) 22.00 - 41.00 %    Monocytes 6.20 0.00 - 13.40 %    Eosinophils 7.20 (H) 0.00 - 6.90 %    Basophils 0.70 0.00 - 1.80 %    Immature Granulocytes 0.10 0.00 - 0.90 %    Nucleated RBC 0.00 /100 WBC    Neutrophils (Absolute) 4.07 2.00 - 7.15 K/uL    Lymphs (Absolute) 4.21 1.00 - 4.80 K/uL    Monos (Absolute) 0.60 0.00 - 0.85 K/uL    Eos (Absolute) 0.70 (H) 0.00 - 0.51 K/uL    Baso (Absolute) 0.07 0.00 - 0.12 K/uL    Immature Granulocytes (abs) 0.01 0.00 - 0.11 K/uL    NRBC (Absolute) 0.00 K/uL   Basic Metabolic Panel    Collection Time: 02/26/19 12:23 AM   Result Value Ref Range    Sodium 135 135 - 145 mmol/L    Potassium 3.8 3.6 - 5.5 mmol/L    Chloride 104 96 - 112 mmol/L    Co2 22 20 - 33 mmol/L    Glucose 110 (H) 65 - 99 mg/dL    Bun 34 (H) 8 - 22 mg/dL     Creatinine 1.14 0.50 - 1.40 mg/dL    Calcium 9.9 8.5 - 10.5 mg/dL    Anion Gap 9.0 0.0 - 11.9   ESTIMATED GFR    Collection Time: 02/26/19 12:23 AM   Result Value Ref Range    GFR If  57 (A) >60 mL/min/1.73 m 2    GFR If Non  47 (A) >60 mL/min/1.73 m 2   MAGNESIUM    Collection Time: 02/26/19 12:23 AM   Result Value Ref Range    Magnesium 2.2 1.5 - 2.5 mg/dL   ACCU-CHEK GLUCOSE    Collection Time: 02/26/19  5:04 AM   Result Value Ref Range    Glucose - Accu-Ck 129 (H) 65 - 99 mg/dL   EC-ECHOCARDIOGRAM COMPLETE W/O CONT    Collection Time: 02/26/19  8:48 AM   Result Value Ref Range    Eject.Frac. MOD BP 74.76     Eject.Frac. MOD 4C 74.12     Eject.Frac. MOD 2C 77.89     Left Ventrical Ejection Fraction 75        Cardiac Imaging and Procedures Review:    EKG and telemetry tracings personally reviewed      ASSESSMENT & PLAN    # Chest pain- atypical per history  # Tachybrady syndrome  # Elevated troponin - trending down  # Moderate pulm HTN  # HTN- uncontrolled likely 2/2 non compliance  # HLD-  ASCVD 7.7% 10 year CV risk  # DMII- A1c 6.9  # Rash  # Anxiety    - Patient declined any coronary interventions  - Continue medical management with ASA, high intensity statin, toprol and lisinopril  - Started low dose digoxin 0.125 od for tachy camilo syndrome  - BP well controlled today  - Echocardiogram showed an EF of 75%, moderate LVH, RSVP 40  - Patient not oblivious to the severity of her diagnosis- likely 2/2 baseline anxiety vs encephalopathy from high BP readings  - Would recommend primary team to consider outpatient psychiatry consultation   - To f/u with cardiology as an outpatient to discuss further work up if she is agreeable    It is my pleasure to participate in the care of Ms. Madison.  Please do not hesitate to contact me with questions or concerns.

## 2019-02-26 NOTE — DISCHARGE SUMMARY
Internal Medicine Discharge Summary  Note Author: Alayna Mccallum M.D.       Name Tania Madison 1945   Age/Sex 73 y.o. female   MRN [id number]     Admit Date:  2019       Discharge Date:  19    Service:   [facility] Internal Medicine Purple Team  Attending Physician(s):  Dr Singh       Senior Resident(s):  Dr. Mccallum  Samuel Resident(s):   Dr. Hawkins  PCP: No primary care provider on file.    Primary Diagnosis:   Hypertensive emergency  Elevated troponin likely due to demand ischemia    Secondary Diagnoses:                Principal Problem (Resolved):    Hypertensive emergency POA: Unknown  Active Problems:    HTN (hypertension) POA: Yes    Tachycardia-bradycardia syndrome (HCC) POA: Unknown    Anxiety with Tangential Thinking POA: Yes    Rash and other nonspecific skin eruption POA: Unknown    Aortic stenosis POA: Unknown    Depression POA: Yes    Type 2 diabetes mellitus (HCC) POA: Yes  Resolved Problems:    Elevated troponin from Demand Ischemia, Type II POA: Unknown    Chest pain POA: Yes    Leucocytosis POA: Unknown    Acute kidney injury (HCC) POA: Unknown      Hospital Summary (Brief Narrative):       Mr. Madison is a 73 y.o. female with past medical history of uncontrolled hypertension, intracerebral hemorrhage in , depression, diabetes mellitus and history of noncompliance was brought in by her friend for intermittent chest pain with high blood pressure readings at home.     On admission, patient gave tangential answers regarding her history and chest pain. Blood pressure was found to be elevated in the 190s/90s with acute kidney injury. She got hydralazine in  ER and was home amlodipine and lisinopril was restarted. She gave very vague description of chest pain and she pointed to epigastric region when she developed pain during the conversation in ER. Initial troponin was 0.02 with EKG findings of left ventricular hypertrophy with reciprocal ST depression in the lateral and  inferior leads. ERP physician spoke to cardiologist in ER and they agreed  that EKG findings signified left heart strain. She was admitted and monitored on telemetry. Troponin increased to 0.05 and 0.4 in the night of admission with ST changes in anterior and lateral leads. Cardiology was consulted. Patient did not wish to get any procedures after explaining risks and benefits. Since the troponin elevation was likely secondary to demand in the setting of hypertensive emergency coupled with patient's history of non-compliance, she was considered a poor candidate for interventions and ischemic work-up, it was decided to manage medically. ECHO showed EF 75%, mild aortic stenosis and mild mitral regurgitation. She developed tachy-camilo syndrome the night before admission, telemetry showed non sustained SVT and sinus bradycardia. Digoxin and metoprolol SR was started by cardiology (ideally she should get pacemaker but patient refused). Kidney function and blood pressure improved. Discharged her in stable condition with ASA, lipitor 40mg, Toprol XL, digoxin, lisinopril and amlodipine. F/U with PCP for further titration of medications.     Of note, patient was found to have a generalized pruritic rash on the extensor surfaces of her arms, hands and forearms. Rash responded well to Loratadine, Hydralazine and topical Triamcinolone. Patient will need outpatient follow up for rash and possibly dermatology referral.   She has a tendency to talk tangential however she has capacity and Mini COG test was normal. She likely has depression and anxiety, home sertraline was restarted and discharged on PRN hydroxyzine.  Psychiatry was not consulted as she was assessed to have capacity. She may need outpatient psychiatry consult. She wished to be DNR/DNI.     Consultants:  Cardiology: Dr Elizabeth-> [facility] service  Procedures:  None    Imaging/ Testing:      EC-ECHOCARDIOGRAM COMPLETE W/O CONT   Final Result      DX-CHEST-LIMITED (1 VIEW)    Final Result         Enlarged cardiac silhouette without evidence of acute disease.        ECHO:  CONCLUSIONS  Left ventricle is small in size.  Moderate concentric left ventricular hypertrophy.  Hyperdynamic left ventricular systolic function.  Left ventricular ejection fraction is visually estimated to be greater   than 75%.  Mild aortic stenosis.  Mild mitral regurgitation.  Mildly dilated left atrium.  Mild tricuspid regurgitation.  Normal inferior vena cava size and inspiratory collapse.  Estimated right ventricular systolic pressure  is 40 mmHg.  Normal inferior vena cava size and inspiratory collapse.  Compared to 11/29/2014, there has been no significant change.     Discharge Medications:         Medication Reconciliation: Completed       Medication List      START taking these medications      Instructions   amLODIPine 10 MG Tabs  Start taking on:  2/27/2019  Commonly known as:  NORVASC   Take 1 Tab by mouth every day.  Dose:  10 mg     aspirin 81 MG EC tablet  Start taking on:  2/27/2019   Take 1 Tab by mouth every day.  Dose:  81 mg     atorvastatin 40 MG Tabs  Commonly known as:  LIPITOR   Take 1 Tab by mouth every evening.  Dose:  40 mg     digoxin 125 MCG Tabs  Commonly known as:  LANOXIN   Take 1 Tab by mouth every day at 6 PM.  Dose:  125 mcg     famotidine 20 MG Tabs  Commonly known as:  PEPCID   Take 1 Tab by mouth 1 time daily as needed (heartburn).  Dose:  20 mg     hydrOXYzine HCl 10 MG Tabs  Commonly known as:  ATARAX   Take 1 Tab by mouth every 8 hours as needed (anxiety or itching).  Dose:  10 mg     metoprolol SR 25 MG Tb24  Start taking on:  2/27/2019  Commonly known as:  TOPROL XL   Take 1 Tab by mouth every day.  Dose:  25 mg     sertraline 25 MG tablet  Start taking on:  2/27/2019  Commonly known as:  ZOLOFT   Take 1 Tab by mouth every day.  Dose:  25 mg     triamcinolone acetonide 0.1 % Oint  Commonly known as:  KENALOG   Apply 1 Application to affected area(s) 2 Times a Day.  Dose:   1 Application        CONTINUE taking these medications      Instructions   lisinopril 40 MG tablet  Commonly known as:  PRINIVIL, ZESTRIL   Take 1 Tab by mouth every day.  Dose:  40 mg        STOP taking these medications    hydrocortisone 1 % Crea        Disposition: Home  Diet: Healthy diet  Activity:  As tolerated    Instructions:       The patient was instructed to return to the ER in the event of worsening symptoms. I have counseled the patient on the importance of compliance and the patient has agreed to proceed with all medical recommendations and follow up plan indicated above.   The patient understands that all medications come with benefits and risks. Risks may include permanent injury or death and these risks can be minimized with close reassessment and monitoring.        Primary Care Provider:    No primary care provider on file.  was informed to establish a PCP for the patient.     Follow up appointment details :      F/U PCP for further management of HTN, tachy-bradysyndrome, rash, depression and anxiety.    Pending Studies:  None    Time spent on discharge day patient visit, preparing discharge paperwork and arranging for patient follow up.    Discharge Time (Minutes) :   35min  Hospital Course Type:  Inpatient Stay >2 midnights

## 2019-02-26 NOTE — ASSESSMENT & PLAN NOTE
Likely secondary to demand in setting of hypertensive emergency. YARELIS risk score of 4 and even if chest pain and elevated Troponins were of ischemic etiology, patient refused any sort of cardiac intervention. Patient is also a poor candidate due to history of non-compliance. Cardiology recommended medical management and treating underlying hypertension.

## 2019-02-26 NOTE — NON-PROVIDER
Internal Medicine Medical Student Note  Note Author: Dipak Bradshaw, Student    Name Tania Madison 1945   Age/Sex 73 y.o. female   MRN [id number]   Code Status DNAR/DNI             Reason for interval visit  (Principal Problem)   Hypertensive emergency    Interval Problem Daily Status Update  (problem status, last 24 hours, new history, new data )   Ms. Madison reports feeling well today. She denies any chest pain, difficulty breathing or pain with breathing. She slept well through the night. Patient has been having bowel movements without signs of blood. She has been urinating her usual amount, and has not noted any hematuria. Patient denies headache, N/V, or abdominal pain. Diagnosis, treatment and risks of non compliance were discussed with the patient. Patient was able to express an understanding of her conditions and the risks of poor compliance. She affirms a williness adhere to discharge plans to seek followup      Physical Exam       Vitals:    19 2045 19 2340 19 0330 19 0801   BP: 154/80 152/78 143/73 117/76   Pulse: 60 63 65 71   Resp: 18 17 17 17   Temp: 36.9 °C (98.4 °F) 37.2 °C (98.9 °F) 36.8 °C (98.3 °F) 36.2 °C (97.1 °F)   TempSrc: Temporal Temporal Temporal Temporal   SpO2: 95% 94% 94% 94%   Weight: 48.7 kg (107 lb 5.8 oz)      Height:         Body mass index is 20.29 kg/m². Weight: 48.7 kg (107 lb 5.8 oz)  Oxygen Therapy:  Pulse Oximetry: 94 %, O2 (LPM): 0, O2 Delivery: None (Room Air)     Recent Labs      19   1432  19   0251  19   0023   WBC  12.3*  11.6*  9.7   RBC  4.75  4.67  4.49   HEMOGLOBIN  14.1  14.0  13.3   HEMATOCRIT  43.0  42.1  40.7   MCV  90.5  90.1  90.6   MCH  29.7  30.0  29.6   RDW  41.5  42.7  42.9   PLATELETCT  222  224  210   MPV  11.2  10.9  10.8   NEUTSPOLYS  74.00*   --   42.20*   LYMPHOCYTES  16.30*   --   43.60*   MONOCYTES  4.40   --   6.20   EOSINOPHILS  4.40   --   7.20*   BASOPHILS  0.60   --   0.70      Recent Labs      02/24/19   1432  02/25/19   0251  02/26/19   0023   SODIUM  133*  136  135   POTASSIUM  3.7  4.0  3.8   CHLORIDE  103  105  104   CO2  22  23  22   GLUCOSE  217*  138*  110*   BUN  18  19  34*     Recent Labs      02/24/19   1432  02/25/19   0251  02/26/19   0023   ALBUMIN  4.5  4.2   --    TBILIRUBIN  0.4  0.6   --    ALKPHOSPHAT  64  59   --    TOTPROTEIN  7.5  8.0   --    ALTSGPT  11  10   --    ASTSGOT  15  16   --    CREATININE  1.20  0.88  1.14     EC-ECHOCARDIOGRAM COMPLETE W/O CONT   Final Result      DX-CHEST-LIMITED (1 VIEW)   Final Result         Enlarged cardiac silhouette without evidence of acute disease.          Physical Exam   Constitutional: She is oriented to person, place, and time and well-developed, well-nourished, and in no distress. No distress.   HENT:   Head: Normocephalic and atraumatic.   Eyes: Pupils are equal, round, and reactive to light. EOM are normal.   Cardiovascular: Exam reveals no gallop and no friction rub.    Murmur heard.   Crescendo decrescendo systolic murmur is present with a grade of 3/6   Murmur unchanged.    Pulmonary/Chest: Effort normal and breath sounds normal. No accessory muscle usage. No respiratory distress. She has no wheezes. She has no rales. She exhibits no tenderness.   Abdominal: Soft. Bowel sounds are normal. She exhibits no distension. There is no tenderness. There is no rebound and no guarding.   Neurological: She is alert and oriented to person, place, and time. No cranial nerve deficit.   Skin: Skin is warm and dry. Rash noted. She is not diaphoretic.   Rash improved since yesterday.   Psychiatric: Affect and judgment normal. She exhibits normal recent memory.       Assessment/Plan     This is a 74yo female who presented to the hospital on 02/24/2019 with sternal chest pain brought on with exceration and a history significant for diabetes and HTN all concerning for ischemic heart strain.     #hypertensive emergency  -continued  hypertensive episodes overnight in the 160's/70's to 194/87              - down trending this morning  -elevated troponin, 0.4 this morning (09/25/19) from 0.02 at admission.  -BNP elevated to 290 at admission.  -EKG consistent w/ LVH.  -Non compliance with home HTN medications.  -cardiology consulted              -patient indicated she is not interested in surgical/endovascular intervention at this time  -Echo cardiogram demonstrated hyperdynamic left ventricular systolic function, and moderate concentric left ventricular hypertrophy with an EF >74% and Grade II diastolic dysfunction. Mild left atrium dilation. Mild aortic stenosis and mild tricuspid regurgitation.  -short run of SVT followup by sinus bradycardia last night.  -cardiology recommended switching Coreg to digoxin 0.125mg/day and Toprol 25mg/day  Plan  -resolved    -discharge with followup in one week with PCP for repeat labs and monitoring.  -medical control of hypertension              -Digoxin 0.125mg/day   -Toprol 25mg/day              -hydralazine 10mg q8hr              -lisinopril 40mg qDay              -amlodipine 10mg POD qDay       #atypical chest pain  -DM, history of HTN w/ poor medical compliance and female gender all concerning atypical chest pain presentation  -EKG shows signs of LVH  -Elevated BNP, 290 at admission, and rising troponin (0.4 this morning 09/25/19 from 0.02 at admission) concerning for heart strain  -unclear if patient is exertional vs anxiety  -Echo (see above)  Plan  - Discharge on HTN medications (as above)  - outpatient follow up in one week with PCP for repeat labs.  - ASA and atorvastatin for risk CAD factor mitigation       #leukocytosis  -down trending to 9.7 today (02/26/19) from 12.3 on admission  -afebrile  -no clear source of infection  -likely 2/2 to stress  Plan  -resolved.     #Type 2 diabetes mellitus  -a1c 6.9  -poor adherence to outpatient metformin  Plan  -currently well controlled.   - encourage  continued diet control and outpatient followup.     #depression  - continue home sertraline     #anxiety  - hydroxyzine 10mg q8hr     #rash  -kenalog 0.1% ointment  - hydroxyzine 10mg q8hr for itch

## 2019-02-26 NOTE — DISCHARGE INSTRUCTIONS
Discharge Instructions    Discharged to home by car with friend. Discharged via wheelchair, hospital escort: Yes.  Special equipment needed: Not Applicable    Be sure to schedule a follow-up appointment with your primary care doctor or any specialists as instructed.     Discharge Plan:   Pneumococcal Vaccine Administered/Refused: Not given - Patient refused pneumococcal vaccine  Influenza Vaccine Indication: Indicated: 9 to 64 years of age    I understand that a diet low in cholesterol, fat, and sodium is recommended for good health. Unless I have been given specific instructions below for another diet, I accept this instruction as my diet prescription.   Other diet: Cardiac     Special Instructions: None    · Is patient discharged on Warfarin / Coumadin?   No       Hypertension  Hypertension is another name for high blood pressure. High blood pressure forces your heart to work harder to pump blood. A blood pressure reading has two numbers, which includes a higher number over a lower number (example: 110/72).  Follow these instructions at home:  · Have your blood pressure rechecked by your doctor.  · Only take medicine as told by your doctor. Follow the directions carefully. The medicine does not work as well if you skip doses. Skipping doses also puts you at risk for problems.  · Do not smoke.  · Monitor your blood pressure at home as told by your doctor.  Contact a doctor if:  · You think you are having a reaction to the medicine you are taking.  · You have repeat headaches or feel dizzy.  · You have puffiness (swelling) in your ankles.  · You have trouble with your vision.  Get help right away if:  · You get a very bad headache and are confused.  · You feel weak, numb, or faint.  · You get chest or belly (abdominal) pain.  · You throw up (vomit).  · You cannot breathe very well.  This information is not intended to replace advice given to you by your health care provider. Make sure you discuss any questions you  have with your health care provider.  Document Released: 06/05/2009 Document Revised: 05/25/2017 Document Reviewed: 10/10/2014  [employer] Interactive Patient Education © 2017 Elsevier Inc.    Chest Pain, Nonspecific  It is often hard to give a specific diagnosis for the cause of chest pain. There is always a chance that your pain could be related to something serious, like a heart attack or a blood clot in the lungs. You need to follow up with your caregiver for further evaluation. More lab tests or other studies such as X-rays, electrocardiography, stress testing, or cardiac imaging may be needed to find the cause of your pain.  Most of the time, nonspecific chest pain improves within 2 to 3 days with rest and mild pain medicine. For the next few days, avoid physical exertion or activities that bring on pain. Do not smoke. Avoid drinking alcohol. Call your caregiver for routine follow-up as advised.   SEEK IMMEDIATE MEDICAL CARE IF:  · You develop increased chest pain or pain that radiates to the arm, neck, jaw, back, or abdomen.   · You develop shortness of breath, increased coughing, or you start coughing up blood.   · You have severe back or abdominal pain, nausea, or vomiting.   · You develop severe weakness, fainting, fever, or chills.   Document Released: 12/18/2006 Document Revised: 03/11/2013 Document Reviewed: 06/06/2008  [employer] Patient Information ©2013 [employer].    Depression / Suicide Risk    As you are discharged from this [facility], it is important to learn how to keep safe from harming yourself.    Recognize the warning signs:  · Abrupt changes in personality, positive or negative- including increase in energy   · Giving away possessions  · Change in eating patterns- significant weight changes-  positive or negative  · Change in sleeping patterns- unable to sleep or sleeping all the time   · Unwillingness or inability to communicate  · Depression  · Unusual sadness, discouragement  and loneliness  · Talk of wanting to die  · Neglect of personal appearance   · Rebelliousness- reckless behavior  · Withdrawal from people/activities they love  · Confusion- inability to concentrate     If you or a loved one observes any of these behaviors or has concerns about self-harm, here's what you can do:  · Talk about it- your feelings and reasons for harming yourself  · Remove any means that you might use to hurt yourself (examples: pills, rope, extension cords, firearm)  · Get professional help from the community (Mental Health, Substance Abuse, psychological counseling)  · Do not be alone:Call your Safe Contact- someone whom you trust who will be there for you.  · Call your local CRISIS HOTLINE 348-8465 or 576-159-6008  · Call your local Children's Mobile Crisis Response Team [place] (343) 691-6461 or www.Myxer  · Call the toll free National Suicide Prevention Hotlines   · National Suicide Prevention Lifeline 367-381-BQBE (2670)  · National Hope Line Network 800-SUICIDE (315-2118)

## 2019-02-26 NOTE — CARE PLAN
Problem: Communication  Goal: The ability to communicate needs accurately and effectively will improve  Outcome: PROGRESSING AS EXPECTED  Communication board updated. All pt questions answered. No further questions at this time. Pt encouraged to voice feelings and ask questions as they arise.    Problem: Safety  Goal: Will remain free from falls  Outcome: PROGRESSING AS EXPECTED  Bed locked and in lowest position, Bed alarm on. Treaded socks on. Call light and belongings within reach. Patient educated to call for assistance. Patient verbalized understanding. Hourly rounding in place.

## 2019-02-26 NOTE — PROGRESS NOTES
Assumed care at 1900, bedside report received from day shift RN. Pt is SR on the telemetry monitor. Patient is AO x 4 and is resting comfortably in bed with friend at bedside. Initial assessment completed and orders reviewed. POC addressed with patient. Call light within reach and hourly rounding in place. No further questions at this time. Fall precautions in place. No complaints of pain at this time.     Patient's rash appears worse since this AM's skin assessment. Rash is widespread, red, and itchy.

## 2019-02-26 NOTE — CARE PLAN
Problem: Safety  Goal: Will remain free from injury  Outcome: PROGRESSING SLOWER THAN EXPECTED  Pt steady on feet, up self in room.     Problem: Knowledge Deficit  Goal: Knowledge of disease process/condition, treatment plan, diagnostic tests, and medications will improve  Outcome: PROGRESSING AS EXPECTED  Pt understands importance of taking HTN medications, this RN reinforced importance multiple times today.

## 2019-02-26 NOTE — PROGRESS NOTES
"       Internal Medicine Interval Note  Note Author: Tucker Hawkins D.O.     Name Tania Madison     1945   Age/Sex 73 y.o. female   MRN [id number]   Code Status DNAR/DNI     After 5PM or if no immediate response to page, please call for cross-coverage  Attending/Team: Dr. Singh / [facility]  See Patient List for primary contact information  Call [phone] to page    1st Call - Day Intern (R1):   Dr. Hawkins 2nd Call - Day Sr. Resident (R2/R3):   Dr. Mccallum          Reason for interval visit  (Principal Problem)   Hypertensive Emergency       Interval Problem Daily Status Update  (24 hours, problem oriented, brief subjective history, new lab/imaging data pertinent to that problem)   Patient denies any chest pain, headache or shortness of breath. Patient states the rash has improved significantly. No other concerns    Overnight, patient had non-sustained SVT with rates upto 170s and then had sinus bradycardia following. Patient's Coreg was held due to bradycardia. Blood pressures 140s-150s.     During attending rounds, extensive discussion regarding patient's mental capacity and ability to care for self.  Though, patient is tangential, she is alert and oriented x 4. She was able to tell us that she has high blood pressure will \"follow the doctor's orders.\" She states she will take her medications at home. Her short term memory on exam is intact. At this time, she has capacity to make medical decisions.     Due to tachy-camilo syndrome, Cardiology started Digoxin and Coreg was switched to Metoprolol SR. Echocardiogram revealed hyperdynamic LVEF 75% with mild AS/MR, dilated LA and moderate LVH.      Review of Systems   Constitutional: Negative for chills and fever.   HENT: Negative for congestion, hearing loss, sore throat and tinnitus.    Eyes: Negative for blurred vision and double vision.   Respiratory: Negative for cough, sputum production, shortness of breath and wheezing.    Cardiovascular: Negative " for chest pain, palpitations, orthopnea and leg swelling.   Gastrointestinal: Negative for abdominal pain, constipation, diarrhea, nausea and vomiting.   Genitourinary: Negative for dysuria, frequency and urgency.   Skin: Positive for itching and rash.   Neurological: Negative for dizziness, tingling, tremors and headaches.       Disposition/Barriers to discharge:   Discharge 2/26/18 with outpatient follow up     Consultants/Specialty  Cardiology     PCP: No primary care provider on file.      Quality Measures  Quality-Core Measures   Reviewed items::  EKG reviewed, Labs reviewed, Medications reviewed and Radiology images reviewed  Hathaway catheter::  No Hathaway  DVT prophylaxis pharmacological::  Enoxaparin (Lovenox)      Physical Exam       Vitals:    02/25/19 2340 02/26/19 0330 02/26/19 0801 02/26/19 1225   BP: 152/78 143/73 117/76 129/57   Pulse: 63 65 71 65   Resp: 17 17 17 17   Temp: 37.2 °C (98.9 °F) 36.8 °C (98.3 °F) 36.2 °C (97.1 °F) 37 °C (98.6 °F)   TempSrc: Temporal Temporal Temporal Temporal   SpO2: 94% 94% 94% 95%   Weight:       Height:         Body mass index is 20.29 kg/m². Weight: 48.7 kg (107 lb 5.8 oz)  Oxygen Therapy:  Pulse Oximetry: 95 %, O2 (LPM): 0, O2 Delivery: None (Room Air)    Physical Exam   Constitutional: She is oriented to person, place, and time.   Female of  descent laying in bed in no acute distress   HENT:   Head: Normocephalic and atraumatic.   Mouth/Throat: Oropharynx is clear and moist.   Eyes: Pupils are equal, round, and reactive to light. EOM are normal. No scleral icterus.   Neck: Normal range of motion. No tracheal deviation present.   Cardiovascular: Normal rate, regular rhythm and intact distal pulses.    Murmur (2/6 systolic ejection murmur) heard.  Pulmonary/Chest: Effort normal and breath sounds normal. No respiratory distress. She has no wheezes. She has no rales. She exhibits no tenderness.   Abdominal: Soft. She exhibits no distension. There is no tenderness.  There is no rebound and no guarding.   Musculoskeletal: Normal range of motion. She exhibits no edema.   Neurological: She is alert and oriented to person, place, and time. She displays no tremor and normal speech. No cranial nerve deficit. She exhibits normal muscle tone.   Short term memory intact   Skin: Skin is warm and dry. Rash (Erythematous ovaloid rash on bilateral legs, dorsal surface of hands, forearms, face improved from previous exam) noted.   Psychiatric: Her mood appears anxious.   + Tangential thinking       Assessment/Plan     Tachycardia-bradycardia syndrome (HCC)   Assessment & Plan    - Patient was monitored on Telemetry and was noted to have non-sustained Supraventricular Tachycardia while the patient was sleeping followed by sinus bradycardia.   - Digoxin 125mcg as per Cardiology Recommendations   - Follow up with Cardiology as outpatient      HTN (hypertension)- (present on admission)   Assessment & Plan    Presented with Hypertensive Emergency which resolved as above   Echocardiogram revealed LVEF 75% with mild AS/MR, dilated LA and moderate LVH.    - Amlodipine 10mg   - Lisinopril 40mg   - Metoprolol 25mg XR   - Follow up in one week with PCP with repeat labs     Aortic stenosis   Assessment & Plan    - Mild aortic stenosis noted on Echocardiogram   - Echocardiogram performed LVEF 75% with mild AS/MR, dilated LA and moderate LVH.         Rash and other nonspecific skin eruption   Assessment & Plan    - Presented with rash of unclear etiology.  Eosinophilia on peripheral blood smear. Pruritic, no active secondary infections seen. Likely a variant of atopic dermatitis   - Continue Triamcinolone topical    - Hydroxyzine prn itching  - Outpatient dermatology referral to follow up.      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 Initially this was thought to be secondary to hypertensive  emergency. However, patient remained circumferential in her thinking process. Patient remained alert and oriented x 4 and was able to communicate why she was in the hospital and that she would need to take her medications to prevent complications related to Hypertension. Patient was deemed to have medical decision making capacity at the time of evaluation.   - Currently stable   - Continue Sertraline   - Continue Hydroxyzine prn      Type 2 diabetes mellitus (HCC)- (present on admission)   Assessment & Plan    HbA1c is 6.9% on admission. Patient not taking any outpatient medication. Per chart review, she was prescribed PO metformin.   - Lifestyle modifications   - Outpatient follow up      Depression- (present on admission)   Assessment & Plan    - Continue Sertraline

## 2019-02-26 NOTE — ASSESSMENT & PLAN NOTE
- Patient was monitored on Telemetry and was noted to have non-sustained Supraventricular Tachycardia while the patient was sleeping followed by sinus bradycardia.   - Digoxin 125mcg as per Cardiology Recommendations   - Follow up with Cardiology as outpatient

## 2019-02-26 NOTE — ASSESSMENT & PLAN NOTE
- Mild aortic stenosis noted on Echocardiogram   - Echocardiogram performed LVEF 75% with mild AS/MR, dilated LA and moderate LVH.

## 2019-02-26 NOTE — PROGRESS NOTES
Received report from NOC RN and assumed care of pt at 0700. Pt AOx4, denies CP. [facility] notified of pts SB down to 48 nonsustained and runs of PSVT up to 174. Plan of care discussed with patient. Bed in low and locked position. Call light and personal belongings in reach.

## 2019-02-26 NOTE — ASSESSMENT & PLAN NOTE
Presented with Hypertensive Emergency which resolved as above   Echocardiogram revealed LVEF 75% with mild AS/MR, dilated LA and moderate LVH.    - Amlodipine 10mg   - Lisinopril 40mg   - Metoprolol 25mg XR   - Follow up in one week with PCP with repeat labs

## 2019-02-27 NOTE — PROGRESS NOTES
D/c instructions reviewed in detail. Pt indicates understanding. All of pts needs met at this time. Pt escorted down and picked up by her friend, Augustus.

## 2019-03-06 NOTE — DOCUMENTATION QUERY
[facility]                                                                         Query Response Note      PATIENT:               MICHELINE SILVA  ACCT #:                  [id number]  MRN:                       [id number]  :                       1945  ADMIT DATE:       2019 2:28 PM  DISCH DATE:        2019 4:05 PM  RESPONDING  PROVIDER #:        [id number]           RESPONSE TEXT:    Unable to determine    QUERY TEXT:    Depression Type [id number]    Depression is documented in the Medical Record.  Please specify the type.    NOTE:  If an appropriate response is not listed below, please respond with a new note.          The patient's Clinical Indicators include:  Patient has history of depression was continued on home sertraline during admission.  Query created by: Zenaida Courtney on 3/4/2019 6:12 AM        Electronically signed by:  NELLIE GARCÍA MD 3/6/2019 10:42 AM

## 2019-10-11 ENCOUNTER — APPOINTMENT (OUTPATIENT)
Dept: RADIOLOGY | Facility: MEDICAL CENTER | Age: 74
End: 2019-10-11
Attending: EMERGENCY MEDICINE
Payer: MEDICARE

## 2019-10-11 ENCOUNTER — HOSPITAL ENCOUNTER (EMERGENCY)
Facility: MEDICAL CENTER | Age: 74
End: 2019-10-11
Attending: EMERGENCY MEDICINE
Payer: MEDICARE

## 2019-10-11 VITALS
BODY MASS INDEX: 22.64 KG/M2 | OXYGEN SATURATION: 98 % | TEMPERATURE: 96.8 F | HEART RATE: 63 BPM | RESPIRATION RATE: 16 BRPM | SYSTOLIC BLOOD PRESSURE: 124 MMHG | DIASTOLIC BLOOD PRESSURE: 77 MMHG | WEIGHT: 104.94 LBS | HEIGHT: 57 IN

## 2019-10-11 DIAGNOSIS — R40.4 ALTERED LEVEL OF CONSCIOUSNESS: ICD-10-CM

## 2019-10-11 DIAGNOSIS — F32.9 REACTIVE DEPRESSION: ICD-10-CM

## 2019-10-11 LAB
ALBUMIN SERPL BCP-MCNC: 4.4 G/DL (ref 3.2–4.9)
ALBUMIN/GLOB SERPL: 1.3 G/DL
ALP SERPL-CCNC: 73 U/L (ref 30–99)
ALT SERPL-CCNC: 9 U/L (ref 2–50)
ANION GAP SERPL CALC-SCNC: 8 MMOL/L (ref 0–11.9)
APPEARANCE UR: CLEAR
AST SERPL-CCNC: 17 U/L (ref 12–45)
BACTERIA #/AREA URNS HPF: NEGATIVE /HPF
BASOPHILS # BLD AUTO: 1.1 % (ref 0–1.8)
BASOPHILS # BLD: 0.1 K/UL (ref 0–0.12)
BILIRUB SERPL-MCNC: 0.3 MG/DL (ref 0.1–1.5)
BILIRUB UR QL STRIP.AUTO: NEGATIVE
BUN SERPL-MCNC: 25 MG/DL (ref 8–22)
CALCIUM SERPL-MCNC: 9.6 MG/DL (ref 8.5–10.5)
CHLORIDE SERPL-SCNC: 105 MMOL/L (ref 96–112)
CO2 SERPL-SCNC: 26 MMOL/L (ref 20–33)
COLOR UR: YELLOW
CREAT SERPL-MCNC: 1.12 MG/DL (ref 0.5–1.4)
EOSINOPHIL # BLD AUTO: 0.24 K/UL (ref 0–0.51)
EOSINOPHIL NFR BLD: 2.7 % (ref 0–6.9)
EPI CELLS #/AREA URNS HPF: NEGATIVE /HPF
ERYTHROCYTE [DISTWIDTH] IN BLOOD BY AUTOMATED COUNT: 41.7 FL (ref 35.9–50)
GLOBULIN SER CALC-MCNC: 3.4 G/DL (ref 1.9–3.5)
GLUCOSE SERPL-MCNC: 108 MG/DL (ref 65–99)
GLUCOSE UR STRIP.AUTO-MCNC: NEGATIVE MG/DL
HCT VFR BLD AUTO: 41.9 % (ref 37–47)
HGB BLD-MCNC: 13.3 G/DL (ref 12–16)
HYALINE CASTS #/AREA URNS LPF: NORMAL /LPF
IMM GRANULOCYTES # BLD AUTO: 0.03 K/UL (ref 0–0.11)
IMM GRANULOCYTES NFR BLD AUTO: 0.3 % (ref 0–0.9)
KETONES UR STRIP.AUTO-MCNC: NEGATIVE MG/DL
LACTATE BLD-SCNC: 1.2 MMOL/L (ref 0.5–2)
LEUKOCYTE ESTERASE UR QL STRIP.AUTO: ABNORMAL
LIPASE SERPL-CCNC: 19 U/L (ref 11–82)
LYMPHOCYTES # BLD AUTO: 3.09 K/UL (ref 1–4.8)
LYMPHOCYTES NFR BLD: 34.4 % (ref 22–41)
MCH RBC QN AUTO: 29 PG (ref 27–33)
MCHC RBC AUTO-ENTMCNC: 31.7 G/DL (ref 33.6–35)
MCV RBC AUTO: 91.3 FL (ref 81.4–97.8)
MICRO URNS: ABNORMAL
MONOCYTES # BLD AUTO: 0.48 K/UL (ref 0–0.85)
MONOCYTES NFR BLD AUTO: 5.3 % (ref 0–13.4)
NEUTROPHILS # BLD AUTO: 5.04 K/UL (ref 2–7.15)
NEUTROPHILS NFR BLD: 56.2 % (ref 44–72)
NITRITE UR QL STRIP.AUTO: NEGATIVE
NRBC # BLD AUTO: 0 K/UL
NRBC BLD-RTO: 0 /100 WBC
PH UR STRIP.AUTO: 7 [PH] (ref 5–8)
PLATELET # BLD AUTO: 210 K/UL (ref 164–446)
PMV BLD AUTO: 11.3 FL (ref 9–12.9)
POTASSIUM SERPL-SCNC: 3.6 MMOL/L (ref 3.6–5.5)
PROT SERPL-MCNC: 7.8 G/DL (ref 6–8.2)
PROT UR QL STRIP: 100 MG/DL
RBC # BLD AUTO: 4.59 M/UL (ref 4.2–5.4)
RBC # URNS HPF: NORMAL /HPF
RBC UR QL AUTO: NEGATIVE
SODIUM SERPL-SCNC: 139 MMOL/L (ref 135–145)
SP GR UR STRIP.AUTO: 1.01
TROPONIN T SERPL-MCNC: 12 NG/L (ref 6–19)
UROBILINOGEN UR STRIP.AUTO-MCNC: 0.2 MG/DL
WBC # BLD AUTO: 9 K/UL (ref 4.8–10.8)
WBC #/AREA URNS HPF: NORMAL /HPF

## 2019-10-11 PROCEDURE — 84484 ASSAY OF TROPONIN QUANT: CPT

## 2019-10-11 PROCEDURE — 99285 EMERGENCY DEPT VISIT HI MDM: CPT | Mod: 25

## 2019-10-11 PROCEDURE — 71045 X-RAY EXAM CHEST 1 VIEW: CPT

## 2019-10-11 PROCEDURE — 85025 COMPLETE CBC W/AUTO DIFF WBC: CPT

## 2019-10-11 PROCEDURE — 83605 ASSAY OF LACTIC ACID: CPT

## 2019-10-11 PROCEDURE — 70450 CT HEAD/BRAIN W/O DYE: CPT

## 2019-10-11 PROCEDURE — 83690 ASSAY OF LIPASE: CPT

## 2019-10-11 PROCEDURE — 81001 URINALYSIS AUTO W/SCOPE: CPT

## 2019-10-11 PROCEDURE — 80053 COMPREHEN METABOLIC PANEL: CPT

## 2019-10-11 PROCEDURE — 87040 BLOOD CULTURE FOR BACTERIA: CPT

## 2019-10-11 RX ORDER — SERTRALINE HYDROCHLORIDE 25 MG/1
25 TABLET, FILM COATED ORAL DAILY
Qty: 30 TAB | Refills: 1 | Status: SHIPPED | OUTPATIENT
Start: 2019-10-11 | End: 2023-03-24

## 2019-10-11 ASSESSMENT — LIFESTYLE VARIABLES: DO YOU DRINK ALCOHOL: NO

## 2019-10-11 NOTE — ED TRIAGE NOTES
.  Chief Complaint   Patient presents with   • Altered Mental Status     x 2 days per family.    • Weakness     ble intermittent.    pt ambulated to triage with family. C/o confusion x 2 days pt is A&O x 3. Pt disoriented to medical hx and home meds. Pt is aware that she has had confusion x 2 days she lives alone. Family came from [place] to visit pt. Family requesting a .   Pt unable to verify home medications at this time. Pt += , no slurred speech or facial droop.

## 2019-10-12 NOTE — ED PROVIDER NOTES
ED Provider Note    CHIEF COMPLAINT  Chief Complaint   Patient presents with   • Altered Mental Status     x 2 days per family.    • Weakness     ble intermittent.        HPI  Tania Madison is a 73 y.o. female who presents to emergency room today with family brought in because of confusion decreased mental status and weakness.  Family is noticed she has had increased confusion over the last 2 weeks.  Patient lives by herself the concern of her safety.  Patient denies any chest pain or shortness of breath no fever chills.  She does admit to memory problems and confusion however she is able to dress herself at home she has a friend that takes her to the grocery shopping.  She has history of depression, hypertension she is on medication she does not have a primary care physician does not follow-up discussed to the emergency room to get her prescriptions.    REVIEW OF SYSTEMS  See HPI for further details. All other systems are negative.     PAST MEDICAL HISTORY  Past Medical History:   Diagnosis Date   • Hemorrhage, intracranial (HCC)    • Chronic airway obstruction, not elsewhere classified    • Depression    • Hypertension    • Pneumonia        FAMILY HISTORY  [unfilled]    SOCIAL HISTORY  Social History     Socioeconomic History   • Marital status: Single     Spouse name: Not on file   • Number of children: Not on file   • Years of education: Not on file   • Highest education level: Not on file   Occupational History   • Not on file   Social Needs   • Financial resource strain: Not on file   • Food insecurity:     Worry: Not on file     Inability: Not on file   • Transportation needs:     Medical: Not on file     Non-medical: Not on file   Tobacco Use   • Smoking status: Never Smoker   Substance and Sexual Activity   • Alcohol use: No   • Drug use: No   • Sexual activity: Not on file   Lifestyle   • Physical activity:     Days per week: Not on file     Minutes per session: Not on file   • Stress: Not on  "file   Relationships   • Social connections:     Talks on phone: Not on file     Gets together: Not on file     Attends Evangelical service: Not on file     Active member of club or organization: Not on file     Attends meetings of clubs or organizations: Not on file     Relationship status: Not on file   • Intimate partner violence:     Fear of current or ex partner: Not on file     Emotionally abused: Not on file     Physically abused: Not on file     Forced sexual activity: Not on file   Other Topics Concern   • Primary/coprimary nurse & associates Not Asked   • Family contact information Not Asked   • OK to release patient information to the following Not Asked   • Patient preferred routine/Privacy concerns Not Asked   • Patient likes and dislikes Not Asked   • Participating In Research Study Not Asked   • Miscellaneous Not Asked   Social History Narrative   • Not on file       SURGICAL HISTORY  No past surgical history on file.    CURRENT MEDICATIONS  Home Medications    **Home medications have not yet been reviewed for this encounter**         ALLERGIES  No Known Allergies    PHYSICAL EXAM  VITAL SIGNS: /71   Pulse 65   Temp 36 °C (96.8 °F) (Oral)   Resp 16   Ht 1.448 m (4' 9\")   Wt 47.6 kg (104 lb 15 oz)   SpO2 98%   BMI 22.71 kg/m²  Room air O2: 99    Constitutional: Well developed, Well nourished, No acute distress, Non-toxic appearance.   HENT: Normocephalic, Atraumatic, Bilateral external ears normal, Oropharynx moist, No oral exudates, Nose normal.   Eyes: PERRLA, EOMI, Conjunctiva normal, No discharge.   Neck: Normal range of motion, No tenderness, Supple, No stridor.   Lymphatic: No lymphadenopathy noted.   Cardiovascular: Normal heart rate, Normal rhythm, No murmurs, No rubs, No gallops.   Thorax & Lungs: Normal breath sounds, No respiratory distress, No wheezing, No chest tenderness.   Abdomen: Bowel sounds normal, Soft, No tenderness, No masses, No pulsatile masses.   Skin: Warm, Dry, No " erythema, No rash.   Back: No tenderness, No CVA tenderness.   Extremities: Intact distal pulses, No edema, No tenderness, No cyanosis, No clubbing.   Musculoskeletal: Good range of motion in all major joints. No tenderness to palpation or major deformities noted.   Neurologic: Alert & oriented x 3, patient does have poor short-term memory normal motor function, Normal sensory function, No focal deficits noted.   Psychiatric: Affect normal, Judgment normal, Mood depressed and somewhat tearful at times.    EKG  Normal sinus rhythm 65 bpm, no ST elevation or depression, no widening of the QRS complex, poor R wave progression, MD intervals are normal, no diagnostic Q waves noted.  This is a 12-lead EKG there is no previous EKG available at this time for comparison.    RADIOLOGY/PROCEDURES  CT-HEAD W/O   Final Result         NO ACUTE ABNORMALITIES ARE NOTED ON CT SCAN OF THE HEAD.      Findings are consistent with atrophy.  Decreased attenuation in the periventricular white matter likely indicates microvascular ischemic disease.      DX-CHEST-PORTABLE (1 VIEW)   Final Result         1.  There is significant cardiomegaly.      2.  No consolidations identified.            COURSE & MEDICAL DECISION MAKING  Pertinent Labs & Imaging studies reviewed. (See chart for details)  Patient blood pressure is well controlled at this time urine showed no evidence of infection CT scan shows no acute process.  I did ask  to see her as well as lifeskills we both been down evaluate the patient in the feel that she can go home they have given her resources for follow-up on possible home health care she will be given primary care physician I did discuss left message with the ER  to good primary care physician for her she was refilled her antidepressants.  Return if further problems discharged in stable condition is noted that she is awake alert oriented x4.  Family at bedside understand the plan as above and are  agreement with the plan as above.    FINAL IMPRESSION  1.  Acute mental status changes  2.  Situational depression  3.  Hypertension by history         Electronically signed by: Tyrone Ponce, 10/11/2019 6:31 PM

## 2019-10-12 NOTE — DISCHARGE PLANNING
Medical Social Work    MSW received call from ERP regarding pt. Pt has been having some memory concerns. Pt's family is from [place] and family is here trying to check on pt.     Pt's family stated pt has been forget with her medications. Pt can still do ADL's. Pt's neighbor takes her shopping and checks in on her. Her brother is in [facility] group home. Pt alert and oriented during assessment. Pt still able to live independently but needs someone to check in weekly. Pt does not know her exact income but stated it might be around $2,000.Pt's sister and brother-in-law at bedside.     MSW went through community resources with pt and family. They are going to get pt set up with Geriatric speciality care and get in touch with [facility]. MSW provided resource packet for placement options, Aging and disability information, group homes, caregivers and Senior Resource guide. Pt has has history of depression. MSW recommended [facility] program.     Family and pt confident they can get pt the needed assistance. Family taking pt home and will continue to assist her. MSW updated ERP on interaction.

## 2019-10-14 ENCOUNTER — PATIENT OUTREACH (OUTPATIENT)
Dept: HEALTH INFORMATION MANAGEMENT | Facility: OTHER | Age: 74
End: 2019-10-14

## 2019-10-16 LAB
BACTERIA BLD CULT: NORMAL
SIGNIFICANT IND 70042: NORMAL
SITE SITE: NORMAL
SOURCE SOURCE: NORMAL

## 2020-03-06 ENCOUNTER — HOSPITAL ENCOUNTER (INPATIENT)
Dept: HOSPITAL 8 - ED | Age: 75
LOS: 2 days | Discharge: HOME | DRG: 305 | End: 2020-03-08
Attending: INTERNAL MEDICINE | Admitting: HOSPITALIST
Payer: MEDICARE

## 2020-03-06 VITALS — WEIGHT: 105.82 LBS | BODY MASS INDEX: 21.33 KG/M2 | HEIGHT: 59 IN

## 2020-03-06 VITALS — DIASTOLIC BLOOD PRESSURE: 72 MMHG | SYSTOLIC BLOOD PRESSURE: 163 MMHG

## 2020-03-06 DIAGNOSIS — E78.5: ICD-10-CM

## 2020-03-06 DIAGNOSIS — Z91.14: ICD-10-CM

## 2020-03-06 DIAGNOSIS — Z86.73: ICD-10-CM

## 2020-03-06 DIAGNOSIS — I16.1: Primary | ICD-10-CM

## 2020-03-06 DIAGNOSIS — I11.9: ICD-10-CM

## 2020-03-06 DIAGNOSIS — I25.10: ICD-10-CM

## 2020-03-06 DIAGNOSIS — Z91.19: ICD-10-CM

## 2020-03-06 LAB
ALBUMIN SERPL-MCNC: 3.8 G/DL (ref 3.4–5)
ANION GAP SERPL CALC-SCNC: 6 MMOL/L (ref 5–15)
BASOPHILS # BLD AUTO: 0.06 X10^3/UL (ref 0–0.1)
BASOPHILS NFR BLD AUTO: 1 % (ref 0–1)
CALCIUM SERPL-MCNC: 8.9 MG/DL (ref 8.5–10.1)
CHLORIDE SERPL-SCNC: 105 MMOL/L (ref 98–107)
CREAT SERPL-MCNC: 0.95 MG/DL (ref 0.55–1.02)
EOSINOPHIL # BLD AUTO: 0.18 X10^3/UL (ref 0–0.4)
EOSINOPHIL NFR BLD AUTO: 2 % (ref 1–7)
ERYTHROCYTE [DISTWIDTH] IN BLOOD BY AUTOMATED COUNT: 13.3 % (ref 9.6–15.2)
EST. AVERAGE GLUCOSE BLD GHB EST-MCNC: 128 MG/DL (ref 0–126)
LYMPHOCYTES # BLD AUTO: 2.91 X10^3/UL (ref 1–3.4)
LYMPHOCYTES NFR BLD AUTO: 30 % (ref 22–44)
MCH RBC QN AUTO: 29.7 PG (ref 27–34.8)
MCHC RBC AUTO-ENTMCNC: 33 G/DL (ref 32.4–35.8)
MCV RBC AUTO: 90.1 FL (ref 80–100)
MD: NO
MONOCYTES # BLD AUTO: 0.48 X10^3/UL (ref 0.2–0.8)
MONOCYTES NFR BLD AUTO: 5 % (ref 2–9)
NEUTROPHILS # BLD AUTO: 6.08 X10^3/UL (ref 1.8–6.8)
NEUTROPHILS NFR BLD AUTO: 63 % (ref 42–75)
PLATELET # BLD AUTO: 225 X10^3/UL (ref 130–400)
PMV BLD AUTO: 8.4 FL (ref 7.4–10.4)
RBC # BLD AUTO: 4.41 X10^6/UL (ref 3.82–5.3)
TROPONIN I SERPL-MCNC: < 0.015 NG/ML (ref 0–0.04)

## 2020-03-06 PROCEDURE — 82607 VITAMIN B-12: CPT

## 2020-03-06 PROCEDURE — 83735 ASSAY OF MAGNESIUM: CPT

## 2020-03-06 PROCEDURE — 83880 ASSAY OF NATRIURETIC PEPTIDE: CPT

## 2020-03-06 PROCEDURE — 82040 ASSAY OF SERUM ALBUMIN: CPT

## 2020-03-06 PROCEDURE — 84443 ASSAY THYROID STIM HORMONE: CPT

## 2020-03-06 PROCEDURE — 93306 TTE W/DOPPLER COMPLETE: CPT

## 2020-03-06 PROCEDURE — 93005 ELECTROCARDIOGRAM TRACING: CPT

## 2020-03-06 PROCEDURE — 96374 THER/PROPH/DIAG INJ IV PUSH: CPT

## 2020-03-06 PROCEDURE — 85025 COMPLETE CBC W/AUTO DIFF WBC: CPT

## 2020-03-06 PROCEDURE — 80053 COMPREHEN METABOLIC PANEL: CPT

## 2020-03-06 PROCEDURE — 84484 ASSAY OF TROPONIN QUANT: CPT

## 2020-03-06 PROCEDURE — 80048 BASIC METABOLIC PNL TOTAL CA: CPT

## 2020-03-06 PROCEDURE — 80061 LIPID PANEL: CPT

## 2020-03-06 PROCEDURE — 84100 ASSAY OF PHOSPHORUS: CPT

## 2020-03-06 PROCEDURE — 36415 COLL VENOUS BLD VENIPUNCTURE: CPT

## 2020-03-06 PROCEDURE — 71045 X-RAY EXAM CHEST 1 VIEW: CPT

## 2020-03-06 PROCEDURE — 83036 HEMOGLOBIN GLYCOSYLATED A1C: CPT

## 2020-03-06 RX ADMIN — LISINOPRIL SCH MG: 5 TABLET ORAL at 22:19

## 2020-03-06 NOTE — NUR
PT CAME IN WITH HIGH BP. 2 EKG PEFORMED. PT IS NONCOMPLIENT WITH BP MEDS. 
HOOKED UP TO CARDIAC MONITOR. BLANKET AND SOCKS PROVIDED. PT MEDICATED PER MAR

## 2020-03-07 VITALS — DIASTOLIC BLOOD PRESSURE: 66 MMHG | SYSTOLIC BLOOD PRESSURE: 166 MMHG

## 2020-03-07 VITALS — DIASTOLIC BLOOD PRESSURE: 71 MMHG | SYSTOLIC BLOOD PRESSURE: 151 MMHG

## 2020-03-07 VITALS — SYSTOLIC BLOOD PRESSURE: 156 MMHG | DIASTOLIC BLOOD PRESSURE: 68 MMHG

## 2020-03-07 VITALS — DIASTOLIC BLOOD PRESSURE: 82 MMHG | SYSTOLIC BLOOD PRESSURE: 199 MMHG

## 2020-03-07 LAB
ANION GAP SERPL CALC-SCNC: 10 MMOL/L (ref 5–15)
BASOPHILS # BLD AUTO: 0.01 X10^3/UL (ref 0–0.1)
BASOPHILS NFR BLD AUTO: 0 % (ref 0–1)
CALCIUM SERPL-MCNC: 8.8 MG/DL (ref 8.5–10.1)
CHLORIDE SERPL-SCNC: 107 MMOL/L (ref 98–107)
CHOL/HDL RATIO: 2.9
CREAT SERPL-MCNC: 1.18 MG/DL (ref 0.55–1.02)
EOSINOPHIL # BLD AUTO: 0.04 X10^3/UL (ref 0–0.4)
EOSINOPHIL NFR BLD AUTO: 0 % (ref 1–7)
ERYTHROCYTE [DISTWIDTH] IN BLOOD BY AUTOMATED COUNT: 13.3 % (ref 9.6–15.2)
HDL CHOL %: 35 % (ref 28–40)
HDL CHOLESTEROL (DIRECT): 71 MG/DL (ref 40–60)
LDL CHOLESTEROL,CALCULATED: 112 MG/DL (ref 54–169)
LDLC/HDLC SERPL: 1.6 {RATIO} (ref 0.5–3)
LYMPHOCYTES # BLD AUTO: 1.88 X10^3/UL (ref 1–3.4)
LYMPHOCYTES NFR BLD AUTO: 18 % (ref 22–44)
MCH RBC QN AUTO: 30.4 PG (ref 27–34.8)
MCHC RBC AUTO-ENTMCNC: 33.2 G/DL (ref 32.4–35.8)
MCV RBC AUTO: 91.5 FL (ref 80–100)
MD: NO
MONOCYTES # BLD AUTO: 0.46 X10^3/UL (ref 0.2–0.8)
MONOCYTES NFR BLD AUTO: 4 % (ref 2–9)
NEUTROPHILS # BLD AUTO: 8.13 X10^3/UL (ref 1.8–6.8)
NEUTROPHILS NFR BLD AUTO: 77 % (ref 42–75)
PLATELET # BLD AUTO: 229 X10^3/UL (ref 130–400)
PMV BLD AUTO: 8.9 FL (ref 7.4–10.4)
RBC # BLD AUTO: 4.31 X10^6/UL (ref 3.82–5.3)
TRIGL SERPL-MCNC: 103 MG/DL (ref 50–200)
VLDLC SERPL CALC-MCNC: 21 MG/DL (ref 0–25)

## 2020-03-07 RX ADMIN — CARVEDILOL SCH MG: 6.25 TABLET, FILM COATED ORAL at 09:11

## 2020-03-07 RX ADMIN — LISINOPRIL SCH MG: 5 TABLET ORAL at 08:14

## 2020-03-07 RX ADMIN — CARVEDILOL SCH MG: 6.25 TABLET, FILM COATED ORAL at 16:39

## 2020-03-08 VITALS — SYSTOLIC BLOOD PRESSURE: 159 MMHG | DIASTOLIC BLOOD PRESSURE: 63 MMHG

## 2020-03-08 VITALS — SYSTOLIC BLOOD PRESSURE: 168 MMHG | DIASTOLIC BLOOD PRESSURE: 83 MMHG

## 2020-03-08 VITALS — DIASTOLIC BLOOD PRESSURE: 62 MMHG | SYSTOLIC BLOOD PRESSURE: 132 MMHG

## 2020-03-08 LAB
ALBUMIN SERPL-MCNC: 3.2 G/DL (ref 3.4–5)
ALP SERPL-CCNC: 57 U/L (ref 45–117)
ALT SERPL-CCNC: 25 U/L (ref 12–78)
ANION GAP SERPL CALC-SCNC: 9 MMOL/L (ref 5–15)
BASOPHILS # BLD AUTO: 0.05 X10^3/UL (ref 0–0.1)
BASOPHILS NFR BLD AUTO: 1 % (ref 0–1)
BILIRUB SERPL-MCNC: 0.7 MG/DL (ref 0.2–1)
CALCIUM SERPL-MCNC: 8.4 MG/DL (ref 8.5–10.1)
CHLORIDE SERPL-SCNC: 108 MMOL/L (ref 98–107)
CREAT SERPL-MCNC: 1.24 MG/DL (ref 0.55–1.02)
EOSINOPHIL # BLD AUTO: 0.21 X10^3/UL (ref 0–0.4)
EOSINOPHIL NFR BLD AUTO: 3 % (ref 1–7)
ERYTHROCYTE [DISTWIDTH] IN BLOOD BY AUTOMATED COUNT: 13.5 % (ref 9.6–15.2)
LYMPHOCYTES # BLD AUTO: 2.74 X10^3/UL (ref 1–3.4)
LYMPHOCYTES NFR BLD AUTO: 32 % (ref 22–44)
MCH RBC QN AUTO: 29.9 PG (ref 27–34.8)
MCHC RBC AUTO-ENTMCNC: 33.2 G/DL (ref 32.4–35.8)
MCV RBC AUTO: 90.2 FL (ref 80–100)
MD: NO
MONOCYTES # BLD AUTO: 0.44 X10^3/UL (ref 0.2–0.8)
MONOCYTES NFR BLD AUTO: 5 % (ref 2–9)
NEUTROPHILS # BLD AUTO: 5.01 X10^3/UL (ref 1.8–6.8)
NEUTROPHILS NFR BLD AUTO: 59 % (ref 42–75)
PLATELET # BLD AUTO: 218 X10^3/UL (ref 130–400)
PMV BLD AUTO: 8.8 FL (ref 7.4–10.4)
PROT SERPL-MCNC: 7.1 G/DL (ref 6.4–8.2)
RBC # BLD AUTO: 4.26 X10^6/UL (ref 3.82–5.3)

## 2020-03-08 RX ADMIN — LISINOPRIL SCH MG: 5 TABLET ORAL at 09:04

## 2020-03-08 RX ADMIN — CARVEDILOL SCH MG: 6.25 TABLET, FILM COATED ORAL at 06:32

## 2020-05-14 ENCOUNTER — TELEPHONE (OUTPATIENT)
Dept: CARDIOLOGY | Facility: MEDICAL CENTER | Age: 75
End: 2020-05-14

## 2020-05-14 NOTE — TELEPHONE ENCOUNTER
TANISHA/uriel BUTCHER at [facility], Rachell, called to follow up on the doctor to doctor request & is now asking VR to call her on her personal cell# [phone] as the office itself will be closed by the time he calls.  She is appreciative.

## 2020-05-14 NOTE — TELEPHONE ENCOUNTER
Rachell Miles called back from [facility]. They were on the other line with [facility]. Please try them again at [phone].    Thank you,  Nimisha MERCADO

## 2020-05-14 NOTE — TELEPHONE ENCOUNTER
Agapito Mas M.D.  You 9 minutes ago (4:21 PM)       Called back. No answer. Left message to call back office.       Agapito Mas M.D.  You 2 minutes ago (4:28 PM)       Called new number. No answer. Left message.

## 2020-05-14 NOTE — TELEPHONE ENCOUNTER
TANISHA/uriel Wayne LPN at [facility], calling for a doctor to doctor conversation about this patient who is currently with [facility]'s provider, Rachell BUTCHER.   Rachell Miles wishes to consult with our ADD on this patient as soon as possible.      Please have VR call Tone  to be connected to MARYANN.

## 2020-05-15 NOTE — TELEPHONE ENCOUNTER
Agapito Mas M.D.  You Just now (5:13 PM)       Spoke to Rachell Miles. Asking about 74F with HTN urgency SBP 200s who did not want to go to ED. She has dementia but a friend helps with meds at home. EKG shows sinus camilo 56 LVH w TWI. Carvedilol 6.25 bid, lisinopril 10mg. This regimen today was switched to carvedilol 6.25mg bid, amlodipine 5, benazepril 20. I recommended initially emergency room visit, but patient was unwilling I am told. This is a good first increase. I further recommended follow up with patient for blood pressure response in a few days either on the phone, video, or in person. Rachell added she referred patient to cardiology so can further follow up with her then. Thank you!

## 2021-01-15 DIAGNOSIS — Z23 NEED FOR VACCINATION: ICD-10-CM

## 2023-03-24 ENCOUNTER — HOSPITAL ENCOUNTER (INPATIENT)
Facility: MEDICAL CENTER | Age: 78
LOS: 6 days | DRG: 071 | End: 2023-03-30
Attending: EMERGENCY MEDICINE | Admitting: INTERNAL MEDICINE
Payer: MEDICARE

## 2023-03-24 ENCOUNTER — APPOINTMENT (OUTPATIENT)
Dept: RADIOLOGY | Facility: MEDICAL CENTER | Age: 78
DRG: 071 | End: 2023-03-24
Attending: EMERGENCY MEDICINE
Payer: MEDICARE

## 2023-03-24 DIAGNOSIS — I49.5 TACHYCARDIA-BRADYCARDIA SYNDROME (HCC): ICD-10-CM

## 2023-03-24 DIAGNOSIS — F32.A DEPRESSION, UNSPECIFIED DEPRESSION TYPE: ICD-10-CM

## 2023-03-24 DIAGNOSIS — F03.B2 MODERATE DEMENTIA WITH PSYCHOTIC DISTURBANCE, UNSPECIFIED DEMENTIA TYPE (HCC): ICD-10-CM

## 2023-03-24 DIAGNOSIS — I10 UNCONTROLLED HYPERTENSION: ICD-10-CM

## 2023-03-24 DIAGNOSIS — D50.0 IRON DEFICIENCY ANEMIA DUE TO CHRONIC BLOOD LOSS: ICD-10-CM

## 2023-03-24 DIAGNOSIS — I10 HTN (HYPERTENSION): ICD-10-CM

## 2023-03-24 DIAGNOSIS — Z86.73 H/O: CVA (CEREBROVASCULAR ACCIDENT): ICD-10-CM

## 2023-03-24 PROBLEM — D64.9 ANEMIA: Status: ACTIVE | Noted: 2023-03-24

## 2023-03-24 PROBLEM — R41.82 AMS (ALTERED MENTAL STATUS): Status: ACTIVE | Noted: 2023-03-24

## 2023-03-24 LAB
ALBUMIN SERPL BCP-MCNC: 4.4 G/DL (ref 3.2–4.9)
ALBUMIN/GLOB SERPL: 1.2 G/DL
ALP SERPL-CCNC: 62 U/L (ref 30–99)
ALT SERPL-CCNC: 18 U/L (ref 2–50)
AMPHET UR QL SCN: NEGATIVE
ANION GAP SERPL CALC-SCNC: 10 MMOL/L (ref 7–16)
ANISOCYTOSIS BLD QL SMEAR: ABNORMAL
APPEARANCE UR: CLEAR
AST SERPL-CCNC: 33 U/L (ref 12–45)
BACTERIA #/AREA URNS HPF: ABNORMAL /HPF
BARBITURATES UR QL SCN: NEGATIVE
BASOPHILS # BLD AUTO: 0.9 % (ref 0–1.8)
BASOPHILS # BLD: 0.05 K/UL (ref 0–0.12)
BENZODIAZ UR QL SCN: NEGATIVE
BILIRUB SERPL-MCNC: 0.2 MG/DL (ref 0.1–1.5)
BILIRUB UR QL STRIP.AUTO: NEGATIVE
BUN SERPL-MCNC: 30 MG/DL (ref 8–22)
BZE UR QL SCN: NEGATIVE
CALCIUM ALBUM COR SERPL-MCNC: 9.2 MG/DL (ref 8.5–10.5)
CALCIUM SERPL-MCNC: 9.5 MG/DL (ref 8.5–10.5)
CANNABINOIDS UR QL SCN: NEGATIVE
CHLORIDE SERPL-SCNC: 104 MMOL/L (ref 96–112)
CO2 SERPL-SCNC: 23 MMOL/L (ref 20–33)
COLOR UR: YELLOW
CREAT SERPL-MCNC: 1.03 MG/DL (ref 0.5–1.4)
EKG IMPRESSION: NORMAL
EOSINOPHIL # BLD AUTO: 0.22 K/UL (ref 0–0.51)
EOSINOPHIL NFR BLD: 4.4 % (ref 0–6.9)
EPI CELLS #/AREA URNS HPF: NEGATIVE /HPF
ERYTHROCYTE [DISTWIDTH] IN BLOOD BY AUTOMATED COUNT: 52.1 FL (ref 35.9–50)
ETHANOL BLD-MCNC: <10.1 MG/DL
FERRITIN SERPL-MCNC: 12 NG/ML (ref 10–291)
GFR SERPLBLD CREATININE-BSD FMLA CKD-EPI: 57 ML/MIN/1.73 M 2
GLOBULIN SER CALC-MCNC: 3.7 G/DL (ref 1.9–3.5)
GLUCOSE SERPL-MCNC: 103 MG/DL (ref 65–99)
GLUCOSE UR STRIP.AUTO-MCNC: NEGATIVE MG/DL
HCT VFR BLD AUTO: 30.9 % (ref 37–47)
HGB BLD-MCNC: 8.6 G/DL (ref 12–16)
HGB RETIC QN AUTO: 18.2 PG/CELL (ref 29–35)
HGB RETIC QN AUTO: 19.1 PG/CELL (ref 29–35)
HYALINE CASTS #/AREA URNS LPF: ABNORMAL /LPF
HYPOCHROMIA BLD QL SMEAR: ABNORMAL
IMM RETICS NFR: 12.5 % (ref 9.3–17.4)
IMM RETICS NFR: 13.4 % (ref 9.3–17.4)
IRON SATN MFR SERPL: 4 % (ref 15–55)
IRON SATN MFR SERPL: 7 % (ref 15–55)
IRON SERPL-MCNC: 24 UG/DL (ref 40–170)
IRON SERPL-MCNC: 40 UG/DL (ref 40–170)
KETONES UR STRIP.AUTO-MCNC: NEGATIVE MG/DL
LEUKOCYTE ESTERASE UR QL STRIP.AUTO: NEGATIVE
LYMPHOCYTES # BLD AUTO: 2.24 K/UL (ref 1–4.8)
LYMPHOCYTES NFR BLD: 44.7 % (ref 22–41)
MACROCYTES BLD QL SMEAR: ABNORMAL
MANUAL DIFF BLD: NORMAL
MCH RBC QN AUTO: 19 PG (ref 27–33)
MCHC RBC AUTO-ENTMCNC: 27.8 G/DL (ref 33.6–35)
MCV RBC AUTO: 68.2 FL (ref 81.4–97.8)
METHADONE UR QL SCN: NEGATIVE
MICRO URNS: ABNORMAL
MICROCYTES BLD QL SMEAR: ABNORMAL
MONOCYTES # BLD AUTO: 0.35 K/UL (ref 0–0.85)
MONOCYTES NFR BLD AUTO: 7 % (ref 0–13.4)
MORPHOLOGY BLD-IMP: NORMAL
NEUTROPHILS # BLD AUTO: 2.15 K/UL (ref 2–7.15)
NEUTROPHILS NFR BLD: 43 % (ref 44–72)
NITRITE UR QL STRIP.AUTO: NEGATIVE
NRBC # BLD AUTO: 0 K/UL
NRBC BLD-RTO: 0 /100 WBC
OPIATES UR QL SCN: NEGATIVE
OXYCODONE UR QL SCN: NEGATIVE
PCP UR QL SCN: NEGATIVE
PH UR STRIP.AUTO: 7.5 [PH] (ref 5–8)
PLATELET # BLD AUTO: 279 K/UL (ref 164–446)
PLATELET BLD QL SMEAR: NORMAL
PMV BLD AUTO: 8.9 FL (ref 9–12.9)
POTASSIUM SERPL-SCNC: 4.6 MMOL/L (ref 3.6–5.5)
PROPOXYPH UR QL SCN: NEGATIVE
PROT SERPL-MCNC: 8.1 G/DL (ref 6–8.2)
PROT UR QL STRIP: 30 MG/DL
RBC # BLD AUTO: 4.53 M/UL (ref 4.2–5.4)
RBC # URNS HPF: ABNORMAL /HPF
RBC BLD AUTO: PRESENT
RBC UR QL AUTO: NEGATIVE
RETICS # AUTO: 0.04 M/UL (ref 0.04–0.06)
RETICS # AUTO: 0.04 M/UL (ref 0.04–0.06)
RETICS/RBC NFR: 1 % (ref 0.8–2.1)
RETICS/RBC NFR: 1 % (ref 0.8–2.1)
SODIUM SERPL-SCNC: 137 MMOL/L (ref 135–145)
SP GR UR STRIP.AUTO: 1.01
T PALLIDUM AB SER QL IA: NORMAL
TIBC SERPL-MCNC: 548 UG/DL (ref 250–450)
TIBC SERPL-MCNC: 589 UG/DL (ref 250–450)
TSH SERPL DL<=0.005 MIU/L-ACNC: 1.74 UIU/ML (ref 0.38–5.33)
UIBC SERPL-MCNC: 508 UG/DL (ref 110–370)
UIBC SERPL-MCNC: 565 UG/DL (ref 110–370)
UROBILINOGEN UR STRIP.AUTO-MCNC: 0.2 MG/DL
VIT B12 SERPL-MCNC: 780 PG/ML (ref 211–911)
WBC # BLD AUTO: 5 K/UL (ref 4.8–10.8)
WBC #/AREA URNS HPF: ABNORMAL /HPF

## 2023-03-24 PROCEDURE — 700102 HCHG RX REV CODE 250 W/ 637 OVERRIDE(OP): Performed by: INTERNAL MEDICINE

## 2023-03-24 PROCEDURE — 770006 HCHG ROOM/CARE - MED/SURG/GYN SEMI*

## 2023-03-24 PROCEDURE — 85046 RETICYTE/HGB CONCENTRATE: CPT

## 2023-03-24 PROCEDURE — 93005 ELECTROCARDIOGRAM TRACING: CPT

## 2023-03-24 PROCEDURE — 80053 COMPREHEN METABOLIC PANEL: CPT

## 2023-03-24 PROCEDURE — 83550 IRON BINDING TEST: CPT

## 2023-03-24 PROCEDURE — 84443 ASSAY THYROID STIM HORMONE: CPT

## 2023-03-24 PROCEDURE — 99285 EMERGENCY DEPT VISIT HI MDM: CPT

## 2023-03-24 PROCEDURE — 82728 ASSAY OF FERRITIN: CPT

## 2023-03-24 PROCEDURE — 36415 COLL VENOUS BLD VENIPUNCTURE: CPT

## 2023-03-24 PROCEDURE — 80307 DRUG TEST PRSMV CHEM ANLYZR: CPT

## 2023-03-24 PROCEDURE — 85007 BL SMEAR W/DIFF WBC COUNT: CPT

## 2023-03-24 PROCEDURE — 70450 CT HEAD/BRAIN W/O DYE: CPT

## 2023-03-24 PROCEDURE — 99223 1ST HOSP IP/OBS HIGH 75: CPT | Mod: AI | Performed by: INTERNAL MEDICINE

## 2023-03-24 PROCEDURE — 71045 X-RAY EXAM CHEST 1 VIEW: CPT

## 2023-03-24 PROCEDURE — A9270 NON-COVERED ITEM OR SERVICE: HCPCS | Performed by: INTERNAL MEDICINE

## 2023-03-24 PROCEDURE — 700111 HCHG RX REV CODE 636 W/ 250 OVERRIDE (IP): Performed by: INTERNAL MEDICINE

## 2023-03-24 PROCEDURE — 83540 ASSAY OF IRON: CPT

## 2023-03-24 PROCEDURE — 82077 ASSAY SPEC XCP UR&BREATH IA: CPT

## 2023-03-24 PROCEDURE — 700101 HCHG RX REV CODE 250: Performed by: EMERGENCY MEDICINE

## 2023-03-24 PROCEDURE — 93005 ELECTROCARDIOGRAM TRACING: CPT | Performed by: EMERGENCY MEDICINE

## 2023-03-24 PROCEDURE — 96374 THER/PROPH/DIAG INJ IV PUSH: CPT

## 2023-03-24 PROCEDURE — 82607 VITAMIN B-12: CPT

## 2023-03-24 PROCEDURE — 86780 TREPONEMA PALLIDUM: CPT

## 2023-03-24 PROCEDURE — 81001 URINALYSIS AUTO W/SCOPE: CPT

## 2023-03-24 PROCEDURE — 85025 COMPLETE CBC W/AUTO DIFF WBC: CPT

## 2023-03-24 RX ORDER — ENOXAPARIN SODIUM 100 MG/ML
30 INJECTION SUBCUTANEOUS DAILY
Status: DISCONTINUED | OUTPATIENT
Start: 2023-03-24 | End: 2023-03-27

## 2023-03-24 RX ORDER — AMLODIPINE BESYLATE 5 MG/1
10 TABLET ORAL DAILY
Status: DISCONTINUED | OUTPATIENT
Start: 2023-03-24 | End: 2023-03-30 | Stop reason: HOSPADM

## 2023-03-24 RX ORDER — AMOXICILLIN 250 MG
2 CAPSULE ORAL 2 TIMES DAILY
Status: DISCONTINUED | OUTPATIENT
Start: 2023-03-25 | End: 2023-03-25

## 2023-03-24 RX ORDER — LISINOPRIL 20 MG/1
40 TABLET ORAL DAILY
Status: DISCONTINUED | OUTPATIENT
Start: 2023-03-25 | End: 2023-03-30 | Stop reason: HOSPADM

## 2023-03-24 RX ORDER — ATORVASTATIN CALCIUM 40 MG/1
40 TABLET, FILM COATED ORAL EVERY EVENING
Status: DISCONTINUED | OUTPATIENT
Start: 2023-03-24 | End: 2023-03-30 | Stop reason: HOSPADM

## 2023-03-24 RX ORDER — LABETALOL HYDROCHLORIDE 5 MG/ML
20 INJECTION, SOLUTION INTRAVENOUS
Status: DISCONTINUED | OUTPATIENT
Start: 2023-03-24 | End: 2023-03-30 | Stop reason: HOSPADM

## 2023-03-24 RX ORDER — HYDRALAZINE HYDROCHLORIDE 20 MG/ML
20 INJECTION INTRAMUSCULAR; INTRAVENOUS EVERY 6 HOURS PRN
Status: DISCONTINUED | OUTPATIENT
Start: 2023-03-24 | End: 2023-03-25

## 2023-03-24 RX ORDER — POLYETHYLENE GLYCOL 3350 17 G/17G
1 POWDER, FOR SOLUTION ORAL
Status: DISCONTINUED | OUTPATIENT
Start: 2023-03-24 | End: 2023-03-25

## 2023-03-24 RX ORDER — FAMOTIDINE 20 MG/1
20 TABLET, FILM COATED ORAL
Status: DISCONTINUED | OUTPATIENT
Start: 2023-03-24 | End: 2023-03-30 | Stop reason: HOSPADM

## 2023-03-24 RX ORDER — BISACODYL 10 MG
10 SUPPOSITORY, RECTAL RECTAL
Status: DISCONTINUED | OUTPATIENT
Start: 2023-03-24 | End: 2023-03-25

## 2023-03-24 RX ORDER — METOPROLOL SUCCINATE 25 MG/1
25 TABLET, EXTENDED RELEASE ORAL DAILY
Status: DISCONTINUED | OUTPATIENT
Start: 2023-03-25 | End: 2023-03-25

## 2023-03-24 RX ADMIN — ATORVASTATIN CALCIUM 40 MG: 40 TABLET, FILM COATED ORAL at 20:38

## 2023-03-24 RX ADMIN — ENOXAPARIN SODIUM 30 MG: 30 INJECTION SUBCUTANEOUS at 20:38

## 2023-03-24 RX ADMIN — AMLODIPINE BESYLATE 10 MG: 5 TABLET ORAL at 20:38

## 2023-03-24 RX ADMIN — LABETALOL HYDROCHLORIDE 20 MG: 5 INJECTION, SOLUTION INTRAVENOUS at 18:55

## 2023-03-24 ASSESSMENT — FIBROSIS 4 INDEX: FIB4 SCORE: 1.65

## 2023-03-24 NOTE — ED TRIAGE NOTES
Tania Madison  77 y.o.  female  Chief Complaint   Patient presents with    Mental Status Change     Pt's  last saw her 2 weeks ago, today found her to be more confused than usual, putting strange objects in the freezer, etc. No unilateral neuro deficits, but pt not aware of the year or month. .Pt repetitive. Denies pain, no trauma noted.       Unclear on pt's baseline, or last seen at baseline. AMADOSA reports seeing bed bugs on pt, none seen by ED staff. EKG done on arrival & pt on monitor.

## 2023-03-24 NOTE — ED PROVIDER NOTES
ED Provider Note    Scribed for Armando Reynoso M.D. by Marlen Gaxiola. 3/24/2023  3:32 PM    Primary care provider: No primary care provider noted.  Means of arrival: EMS    CHIEF COMPLAINT  Chief Complaint   Patient presents with    Mental Status Change     Pt's  last saw her 2 weeks ago, today found her to be more confused than usual, putting strange objects in the freezer, etc. No unilateral neuro deficits, but pt not aware of the year or month. .Pt repetitive. Denies pain, no trauma noted.     LIMITATION TO HISTORY   Select: Altered Mental Status    HPI    Tania Madison is a 77 y.o. female who presents to the Emergency Department for mental status change onset today. Per nursing note, the patient's  last saw her two weeks ago and was found today to be more confused than usual. The patient states she too believes she is confused and has not been taking her sertraline. She reports that she does not know what month it is and claims she is forgetful sometimes. Patient denies fever, headache, cough, shortness of breath, vomiting, diarrhea, or pain with urination. Denies recent fall, head injury, or alcohol usage. No alleviating or exacerbating factors reported. Patient adds that she has been told previously by a doctor that she has symptoms representing Alzheimer's.     OUTSIDE HISTORIAN(S):  Select: Nursing note states that patient was sent by her  due to increased confusion.  Her nurse was able to reach her niece.  All the family lives in the [place] area.  They have been concerned for some time that the patient has dementia.    EXTERNAL RECORDS REVIEWED  Select: Other CT head from 2019 shows moderate atrophy and periventricular white matter change. Inpatient MRI from 2005 shows left basal ganglia parecnhymal hemorrhage. Discharge summary from 2019 demonstrates that patient was seen for chest pain and elevated troponin. Patient has a history of depression and anxiety  with tangential thinking. She has diabetes.    REVIEW OF SYSTEMS  Pertinent positives include: mental status change.  Pertinent negatives include: fever, headache, cough, shortness of breath, vomiting, diarrhea, or pain with urination.      PAST MEDICAL HISTORY  Past Medical History:   Diagnosis Date    Chronic airway obstruction, not elsewhere classified     Depression     Hemorrhage, intracranial (HCC)     Hypertension     Pneumonia      FAMILY HISTORY  Family History   Problem Relation Age of Onset    Other Mother         unknown    Other Father         unknown     SOCIAL HISTORY  Social History     Tobacco Use    Smoking status: Never   Substance Use Topics    Alcohol use: No    Drug use: No     Social History     Substance and Sexual Activity   Drug Use No     SURGICAL HISTORY  No past surgical history noted.    CURRENT MEDICATIONS  No current facility-administered medications for this encounter.    Current Outpatient Medications:     sertraline (ZOLOFT) 25 MG tablet, Take 1 Tab by mouth every day., Disp: 30 Tab, Rfl: 1    amLODIPine (NORVASC) 10 MG Tab, Take 1 Tab by mouth every day., Disp: 30 Tab, Rfl: 1    aspirin EC 81 MG EC tablet, Take 1 Tab by mouth every day., Disp: 30 Tab, Rfl: 1    atorvastatin (LIPITOR) 40 MG Tab, Take 1 Tab by mouth every evening., Disp: 30 Tab, Rfl: 1    digoxin (LANOXIN) 125 MCG Tab, Take 1 Tab by mouth every day at 6 PM., Disp: 30 Tab, Rfl: 1    famotidine (PEPCID) 20 MG Tab, Take 1 Tab by mouth 1 time daily as needed (heartburn)., Disp: 30 Tab, Rfl: 0    hydrOXYzine HCl (ATARAX) 10 MG Tab, Take 1 Tab by mouth every 8 hours as needed (anxiety or itching)., Disp: 30 Tab, Rfl: 0    metoprolol SR (TOPROL XL) 25 MG TABLET SR 24 HR, Take 1 Tab by mouth every day., Disp: 30 Tab, Rfl: 1    sertraline (ZOLOFT) 25 MG tablet, Take 1 Tab by mouth every day., Disp: 30 Tab, Rfl: 1    triamcinolone acetonide (KENALOG) 0.1 % Ointment, Apply 1 Application to affected area(s) 2 Times a  "Day., Disp: 60 g, Rfl: 0    lisinopril (PRINIVIL, ZESTRIL) 40 MG tablet, Take 1 Tab by mouth every day., Disp: 30 Tab, Rfl: 11    ALLERGIES  No Known Allergies    PHYSICAL EXAM  VITAL SIGNS: BP (!) 243/107   Pulse 77   Temp 36.7 °C (98 °F) (Temporal)   Resp 16   Ht 1.448 m (4' 9\")   Wt 45.4 kg (100 lb)   SpO2 96%   BMI 21.64 kg/m²   Constitutional: Well developed, Well nourished.  HENT: Normocephalic, atraumatic, bilateral external ears normal, No intraoral erythema, edema, exudate  Eyes: PERRLA, conjunctiva pink, no scleral icterus.   Cardiovascular: Regular rate and rhythm. No murmurs, rubs or gallops.  No dependent edema or calf tenderness  Respiratory: Lungs clear to auscultation bilaterally. No wheezes, rales, or rhonchi.  Abdominal:  Abdomen soft, non-tender, non distended. No rebound, or guarding.    Skin: No erythema, no rash. No wounds or bruising.  Genitourinary: No costovertebral angle tenderness.   Rectal: Mucus, Hemoccult negative.  Musculoskeletal: no deformities.   Neurologic: Alert & Oriented to person and place, cranial nerves 2-12 intact by passive exam.  No focal deficit noted.  Psychiatric: Affect normal, Judgment normal, Mood normal.     LABS Ordered and Reviewed by Me:  Results for orders placed or performed during the hospital encounter of 03/24/23   CBC With Differential   Result Value Ref Range    WBC 5.0 4.8 - 10.8 K/uL    RBC 4.53 4.20 - 5.40 M/uL    Hemoglobin 8.6 (L) 12.0 - 16.0 g/dL    Hematocrit 30.9 (L) 37.0 - 47.0 %    MCV 68.2 (L) 81.4 - 97.8 fL    MCH 19.0 (L) 27.0 - 33.0 pg    MCHC 27.8 (L) 33.6 - 35.0 g/dL    RDW 52.1 (H) 35.9 - 50.0 fL    Platelet Count 279 164 - 446 K/uL    MPV 8.9 (L) 9.0 - 12.9 fL    Neutrophils-Polys 43.00 (L) 44.00 - 72.00 %    Lymphocytes 44.70 (H) 22.00 - 41.00 %    Monocytes 7.00 0.00 - 13.40 %    Eosinophils 4.40 0.00 - 6.90 %    Basophils 0.90 0.00 - 1.80 %    Nucleated RBC 0.00 /100 WBC    Neutrophils (Absolute) 2.15 2.00 - 7.15 K/uL    Lymphs " (Absolute) 2.24 1.00 - 4.80 K/uL    Monos (Absolute) 0.35 0.00 - 0.85 K/uL    Eos (Absolute) 0.22 0.00 - 0.51 K/uL    Baso (Absolute) 0.05 0.00 - 0.12 K/uL    NRBC (Absolute) 0.00 K/uL    Hypochromia 2+ (A)     Anisocytosis 2+ (A)     Macrocytosis 1+     Microcytosis 2+ (A)    Comp Metabolic Panel   Result Value Ref Range    Sodium 137 135 - 145 mmol/L    Potassium 4.6 3.6 - 5.5 mmol/L    Chloride 104 96 - 112 mmol/L    Co2 23 20 - 33 mmol/L    Anion Gap 10.0 7.0 - 16.0    Glucose 103 (H) 65 - 99 mg/dL    Bun 30 (H) 8 - 22 mg/dL    Creatinine 1.03 0.50 - 1.40 mg/dL    Calcium 9.5 8.5 - 10.5 mg/dL    AST(SGOT) 33 12 - 45 U/L    ALT(SGPT) 18 2 - 50 U/L    Alkaline Phosphatase 62 30 - 99 U/L    Total Bilirubin 0.2 0.1 - 1.5 mg/dL    Albumin 4.4 3.2 - 4.9 g/dL    Total Protein 8.1 6.0 - 8.2 g/dL    Globulin 3.7 (H) 1.9 - 3.5 g/dL    A-G Ratio 1.2 g/dL   Diagnostic Alcohol   Result Value Ref Range    Diagnostic Alcohol <10.1 <10.1 mg/dL   Urine Drug Screen (Triage)   Result Value Ref Range    Amphetamines Urine Negative Negative    Barbiturates Negative Negative    Benzodiazepines Negative Negative    Cocaine Metabolite Negative Negative    Methadone Negative Negative    Opiates Negative Negative    Oxycodone Negative Negative    Phencyclidine -Pcp Negative Negative    Propoxyphene Negative Negative    Cannabinoid Metab Negative Negative   URINALYSIS    Specimen: Urine   Result Value Ref Range    Color Yellow     Character Clear     Specific Gravity 1.011 <1.035    Ph 7.5 5.0 - 8.0    Glucose Negative Negative mg/dL    Ketones Negative Negative mg/dL    Protein 30 (A) Negative mg/dL    Bilirubin Negative Negative    Urobilinogen, Urine 0.2 Negative    Nitrite Negative Negative    Leukocyte Esterase Negative Negative    Occult Blood Negative Negative    Micro Urine Req Microscopic    TSH   Result Value Ref Range    TSH 1.740 0.380 - 5.330 uIU/mL   T.PALLIDUM AB ALETHEA (SCREENING)   Result Value Ref Range    Syphilis,  Treponemal Qual Non-Reactive Non-Reactive   CORRECTED CALCIUM   Result Value Ref Range    Correct Calcium 9.2 8.5 - 10.5 mg/dL   ESTIMATED GFR   Result Value Ref Range    GFR (CKD-EPI) 57 (A) >60 mL/min/1.73 m 2   VITAMIN B12   Result Value Ref Range    Vitamin B12 -True Cobalamin 780 211 - 911 pg/mL   DIFFERENTIAL MANUAL   Result Value Ref Range    Manual Diff Status PERFORMED    PERIPHERAL SMEAR REVIEW   Result Value Ref Range    Peripheral Smear Review see below    PLATELET ESTIMATE   Result Value Ref Range    Plt Estimation Normal    MORPHOLOGY   Result Value Ref Range    RBC Morphology Present    URINE MICROSCOPIC (W/UA)   Result Value Ref Range    WBC 0-2 /hpf    RBC 0-2 /hpf    Bacteria Moderate (A) None /hpf    Epithelial Cells Negative /hpf    Hyaline Cast 0-2 /lpf   EKG   Result Value Ref Range    Report       [facility] Emergency Dept.    Test Date:  2023  Pt Name:    MICHELINE SILVA                  Department: ER  MRN:        [id number]                      Room:       [facility]  Gender:     Female                       Technician:  :        [id number]                   Requested By:ER TRIAGE PROTOCOL  Order #:    [id number]                    Reading MD:    Measurements  Intervals                                Axis  Rate:       79                           P:          43  KY:         170                          QRS:        44  QRSD:       89                           T:          194  QT:         402  QTc:        461    Interpretive Statements  Sinus rhythm  Atrial premature complexes  LVH with secondary repolarization abnormality  Anterior ST elevation, probably due to LVH  Compared to ECG 2019 04:14:12  Atrial premature complex(es) now present  ST (T wave) deviation now present  Possible ischemia no longer present       EKG Interpretation by me    Rhythm: normal sinus   Rate: 79  Axis: normal  Ectopy: none  Conduction: normal  ST/T Waves: no acute change  Q Waves: none  R Wave  progression: normal  Hypertrophy changes: High voltage QRS    Clinical Impression: Sinus rhythm, PACs, LVH.    RADIOLOGY  I have independently interpreted the CT head associated with this visit it demonstrates atrophy and periventricular white matter change.  I am awaiting the final reading from the radiologist.     Final Radiology Report  CT-HEAD W/O   Final Result         1. No acute intracranial abnormality. No evidence of acute intracranial hemorrhage or mass lesion.                     DX-CHEST-PORTABLE (1 VIEW)   Final Result      1.  Cardiomegaly.   2.  Mild interstitial infiltrates and/or edema.        Radiologist interpretation have been reviewed by me.     ED COURSE:      3:32 PM - Patient seen and examined at bedside. Ordered CT-head w/o, DX-Chest, Vitamin B12, UA, Urine Drug Screen, Diagnostic Alcohol, CMP, CBC w/ Diff, T. Pallidum Ab ALETHEA, and TSH to evaluate. Patient verbalizes understanding and agreement to this plan of care.    4:54 PM - Rectal exam performed at this time.     5:22 PM - The patient had the opportunity to ask any questions. The plan for hospitalization was discussed with the patient given their current presentation and diagnostic study results. Patient is understanding and agreeable to the plan for hospitalization.     5:51 PM - I discussed the patient's case and the above findings with Dr. Aviles (Hospitalist) who agrees to evaluate the patient for hospitalization.    I have discussed management of the patient with the following physicians and sources:    Dr. Aviles (Hospitalist)    MEDICAL DECISION MAKING:  PROBLEMS EVALUATED THIS VISIT:    This patient presents with confusion reportedly consisting of putting unusual things in the freezer.  Here she is oriented only to person and place and not time and not particularly well to events.  Based on CT imaging and history from family is likely this is dementia.  There is no evidence of syphilis, thyroid disease or vitamin B12  deficiency.  There is no evidence of multi-infarct dementia.  There is no affection hypoglycemia or hyponatremia.  She will require admission for assessment for safety at home and for whether she needs placement.  Patient also has an anemia that is microcytic but is currently heme-negative on rectal exam.  Nevertheless she still likely having blood loss.  This should be further assessed since the patient is extremely unlikely to follow-up as an outpatient.    RISK:  High given need for escalation of care to include admission    PLAN:  Patient will be hospitalized by Dr. Aviles for social work and case work eval, PT OT javi, further work-up of anemia.    CONDITION: guarded.     FINAL IMPRESSION  1. Iron deficiency anemia due to chronic blood loss    2. Moderate dementia with psychotic disturbance, unspecified dementia type         I, Marlen Gaxiola (Jaqui), am scribing for, and in the presence of, Armando Reynoso M.D..    Electronically signed by: Marlen Gaxiola (Jaqui), 3/24/2023    IArmando M.D. personally performed the services described in this documentation, as scribed by Marlen Gaxiola in my presence, and it is both accurate and complete.    The note accurately reflects work and decisions made by me.  Armando Reynoso M.D.  3/24/2023  8:51 PM

## 2023-03-25 PROBLEM — R11.2 NAUSEA & VOMITING: Status: ACTIVE | Noted: 2023-03-25

## 2023-03-25 LAB
ALBUMIN SERPL BCP-MCNC: 3.9 G/DL (ref 3.2–4.9)
ALBUMIN/GLOB SERPL: 1.3 G/DL
ALP SERPL-CCNC: 54 U/L (ref 30–99)
ALT SERPL-CCNC: 14 U/L (ref 2–50)
AMMONIA PLAS-SCNC: 13 UMOL/L (ref 11–45)
ANION GAP SERPL CALC-SCNC: 13 MMOL/L (ref 7–16)
AST SERPL-CCNC: 22 U/L (ref 12–45)
BASOPHILS # BLD AUTO: 1.1 % (ref 0–1.8)
BASOPHILS # BLD: 0.06 K/UL (ref 0–0.12)
BILIRUB SERPL-MCNC: 0.3 MG/DL (ref 0.1–1.5)
BUN SERPL-MCNC: 26 MG/DL (ref 8–22)
CALCIUM ALBUM COR SERPL-MCNC: 8.8 MG/DL (ref 8.5–10.5)
CALCIUM SERPL-MCNC: 8.7 MG/DL (ref 8.5–10.5)
CHLORIDE SERPL-SCNC: 104 MMOL/L (ref 96–112)
CO2 SERPL-SCNC: 20 MMOL/L (ref 20–33)
COMMENT 1642: NORMAL
CREAT SERPL-MCNC: 1 MG/DL (ref 0.5–1.4)
EOSINOPHIL # BLD AUTO: 0.26 K/UL (ref 0–0.51)
EOSINOPHIL NFR BLD: 4.8 % (ref 0–6.9)
ERYTHROCYTE [DISTWIDTH] IN BLOOD BY AUTOMATED COUNT: 50.3 FL (ref 35.9–50)
EST. AVERAGE GLUCOSE BLD GHB EST-MCNC: 120 MG/DL
FERRITIN SERPL-MCNC: 14.2 NG/ML (ref 10–291)
GFR SERPLBLD CREATININE-BSD FMLA CKD-EPI: 59 ML/MIN/1.73 M 2
GLOBULIN SER CALC-MCNC: 3.1 G/DL (ref 1.9–3.5)
GLUCOSE SERPL-MCNC: 114 MG/DL (ref 65–99)
HBA1C MFR BLD: 5.8 % (ref 4–5.6)
HCT VFR BLD AUTO: 28.1 % (ref 37–47)
HGB BLD-MCNC: 8 G/DL (ref 12–16)
IMM GRANULOCYTES # BLD AUTO: 0.01 K/UL (ref 0–0.11)
IMM GRANULOCYTES NFR BLD AUTO: 0.2 % (ref 0–0.9)
LYMPHOCYTES # BLD AUTO: 1.56 K/UL (ref 1–4.8)
LYMPHOCYTES NFR BLD: 28.5 % (ref 22–41)
MCH RBC QN AUTO: 18.8 PG (ref 27–33)
MCHC RBC AUTO-ENTMCNC: 28.5 G/DL (ref 33.6–35)
MCV RBC AUTO: 66.1 FL (ref 81.4–97.8)
MONOCYTES # BLD AUTO: 0.36 K/UL (ref 0–0.85)
MONOCYTES NFR BLD AUTO: 6.6 % (ref 0–13.4)
MORPHOLOGY BLD-IMP: NORMAL
NEUTROPHILS # BLD AUTO: 3.22 K/UL (ref 2–7.15)
NEUTROPHILS NFR BLD: 58.8 % (ref 44–72)
NRBC # BLD AUTO: 0 K/UL
NRBC BLD-RTO: 0 /100 WBC
PLATELET # BLD AUTO: 268 K/UL (ref 164–446)
PMV BLD AUTO: 9.5 FL (ref 9–12.9)
POTASSIUM SERPL-SCNC: 3.8 MMOL/L (ref 3.6–5.5)
PROT SERPL-MCNC: 7 G/DL (ref 6–8.2)
RBC # BLD AUTO: 4.25 M/UL (ref 4.2–5.4)
SODIUM SERPL-SCNC: 137 MMOL/L (ref 135–145)
WBC # BLD AUTO: 5.5 K/UL (ref 4.8–10.8)

## 2023-03-25 PROCEDURE — 770001 HCHG ROOM/CARE - MED/SURG/GYN PRIV*

## 2023-03-25 PROCEDURE — 97165 OT EVAL LOW COMPLEX 30 MIN: CPT

## 2023-03-25 PROCEDURE — 82140 ASSAY OF AMMONIA: CPT

## 2023-03-25 PROCEDURE — 83036 HEMOGLOBIN GLYCOSYLATED A1C: CPT

## 2023-03-25 PROCEDURE — 80053 COMPREHEN METABOLIC PANEL: CPT

## 2023-03-25 PROCEDURE — 700102 HCHG RX REV CODE 250 W/ 637 OVERRIDE(OP): Performed by: INTERNAL MEDICINE

## 2023-03-25 PROCEDURE — 700111 HCHG RX REV CODE 636 W/ 250 OVERRIDE (IP): Performed by: INTERNAL MEDICINE

## 2023-03-25 PROCEDURE — 85025 COMPLETE CBC W/AUTO DIFF WBC: CPT

## 2023-03-25 PROCEDURE — 36415 COLL VENOUS BLD VENIPUNCTURE: CPT

## 2023-03-25 PROCEDURE — A9270 NON-COVERED ITEM OR SERVICE: HCPCS | Performed by: INTERNAL MEDICINE

## 2023-03-25 PROCEDURE — 700105 HCHG RX REV CODE 258: Performed by: INTERNAL MEDICINE

## 2023-03-25 PROCEDURE — 97162 PT EVAL MOD COMPLEX 30 MIN: CPT

## 2023-03-25 PROCEDURE — 99233 SBSQ HOSP IP/OBS HIGH 50: CPT | Performed by: INTERNAL MEDICINE

## 2023-03-25 RX ORDER — PROCHLORPERAZINE EDISYLATE 5 MG/ML
10 INJECTION INTRAMUSCULAR; INTRAVENOUS EVERY 6 HOURS PRN
Status: DISCONTINUED | OUTPATIENT
Start: 2023-03-25 | End: 2023-03-30 | Stop reason: HOSPADM

## 2023-03-25 RX ORDER — HYDRALAZINE HYDROCHLORIDE 20 MG/ML
10 INJECTION INTRAMUSCULAR; INTRAVENOUS EVERY 6 HOURS PRN
Status: DISCONTINUED | OUTPATIENT
Start: 2023-03-25 | End: 2023-03-30 | Stop reason: HOSPADM

## 2023-03-25 RX ORDER — METOPROLOL SUCCINATE 25 MG/1
25 TABLET, EXTENDED RELEASE ORAL EVERY EVENING
Status: DISCONTINUED | OUTPATIENT
Start: 2023-03-25 | End: 2023-03-27

## 2023-03-25 RX ORDER — HYDRALAZINE HYDROCHLORIDE 10 MG/1
10 TABLET, FILM COATED ORAL EVERY 8 HOURS
Status: DISCONTINUED | OUTPATIENT
Start: 2023-03-25 | End: 2023-03-30 | Stop reason: HOSPADM

## 2023-03-25 RX ORDER — ONDANSETRON 2 MG/ML
4 INJECTION INTRAMUSCULAR; INTRAVENOUS EVERY 4 HOURS PRN
Status: DISCONTINUED | OUTPATIENT
Start: 2023-03-25 | End: 2023-03-30 | Stop reason: HOSPADM

## 2023-03-25 RX ADMIN — ATORVASTATIN CALCIUM 40 MG: 40 TABLET, FILM COATED ORAL at 18:19

## 2023-03-25 RX ADMIN — ONDANSETRON 4 MG: 2 INJECTION INTRAMUSCULAR; INTRAVENOUS at 13:18

## 2023-03-25 RX ADMIN — ASPIRIN 81 MG: 81 TABLET, COATED ORAL at 05:18

## 2023-03-25 RX ADMIN — DOCUSATE SODIUM 50 MG AND SENNOSIDES 8.6 MG 2 TABLET: 8.6; 5 TABLET, FILM COATED ORAL at 05:18

## 2023-03-25 RX ADMIN — HYDRALAZINE HYDROCHLORIDE 20 MG: 20 INJECTION INTRAMUSCULAR; INTRAVENOUS at 14:04

## 2023-03-25 RX ADMIN — METOPROLOL SUCCINATE 25 MG: 25 TABLET, EXTENDED RELEASE ORAL at 18:19

## 2023-03-25 RX ADMIN — LISINOPRIL 40 MG: 20 TABLET ORAL at 05:17

## 2023-03-25 RX ADMIN — HYDRALAZINE HYDROCHLORIDE 10 MG: 10 TABLET, FILM COATED ORAL at 16:22

## 2023-03-25 RX ADMIN — SODIUM CHLORIDE 250 MG: 9 INJECTION, SOLUTION INTRAVENOUS at 10:09

## 2023-03-25 RX ADMIN — SODIUM CHLORIDE 250 MG: 9 INJECTION, SOLUTION INTRAVENOUS at 21:50

## 2023-03-25 RX ADMIN — SERTRALINE 25 MG: 50 TABLET, FILM COATED ORAL at 06:40

## 2023-03-25 ASSESSMENT — COGNITIVE AND FUNCTIONAL STATUS - GENERAL
DAILY ACTIVITIY SCORE: 18
TOILETING: A LITTLE
MOBILITY SCORE: 19
WALKING IN HOSPITAL ROOM: A LITTLE
MOVING FROM LYING ON BACK TO SITTING ON SIDE OF FLAT BED: A LITTLE
HELP NEEDED FOR BATHING: A LITTLE
HELP NEEDED FOR BATHING: A LITTLE
WALKING IN HOSPITAL ROOM: A LITTLE
SUGGESTED CMS G CODE MODIFIER MOBILITY: CJ
DAILY ACTIVITIY SCORE: 19
MOBILITY SCORE: 21
SUGGESTED CMS G CODE MODIFIER DAILY ACTIVITY: CK
DRESSING REGULAR UPPER BODY CLOTHING: A LITTLE
DRESSING REGULAR LOWER BODY CLOTHING: A LITTLE
DRESSING REGULAR UPPER BODY CLOTHING: A LITTLE
CLIMB 3 TO 5 STEPS WITH RAILING: A LITTLE
PERSONAL GROOMING: A LITTLE
PERSONAL GROOMING: A LITTLE
STANDING UP FROM CHAIR USING ARMS: A LITTLE
TOILETING: A LITTLE
SUGGESTED CMS G CODE MODIFIER MOBILITY: CK
STANDING UP FROM CHAIR USING ARMS: A LITTLE
CLIMB 3 TO 5 STEPS WITH RAILING: A LOT
EATING MEALS: A LITTLE
SUGGESTED CMS G CODE MODIFIER DAILY ACTIVITY: CK
DRESSING REGULAR LOWER BODY CLOTHING: A LITTLE

## 2023-03-25 ASSESSMENT — ENCOUNTER SYMPTOMS
MEMORY LOSS: 1
HEADACHES: 0
SHORTNESS OF BREATH: 0
DIZZINESS: 0
VOMITING: 1
NAUSEA: 1
MYALGIAS: 0
WEAKNESS: 1

## 2023-03-25 ASSESSMENT — ACTIVITIES OF DAILY LIVING (ADL): TOILETING: INDEPENDENT

## 2023-03-25 ASSESSMENT — PAIN DESCRIPTION - PAIN TYPE: TYPE: ACUTE PAIN

## 2023-03-25 ASSESSMENT — GAIT ASSESSMENTS
ASSISTIVE DEVICE: FRONT WHEEL WALKER;NONE
GAIT LEVEL OF ASSIST: CONTACT GUARD ASSIST

## 2023-03-25 ASSESSMENT — LIFESTYLE VARIABLES
REASON UNABLE TO ASSESS: CONFUSION
REASON UNABLE TO ASSESS: CONFUSION

## 2023-03-25 NOTE — RESPIRATORY CARE
COPD EDUCATION by COPD CLINICAL EDUCATOR  3/25/2023 at 6:44 AM by Inez Iyer, RRT     Patient reviewed by COPD education team. Patient does not have a history or diagnosis of COPD and is a non-smoker.  Therefore, patient does not qualify for the COPD program.

## 2023-03-25 NOTE — THERAPY
"Physical Therapy   Initial Evaluation     Patient Name: Tania Madison  Age:  77 y.o., Sex:  female  Medical Record #: [id number]  Today's Date: 3/25/2023     Precautions  Precautions: (P) Fall Risk    Assessment    Patient is 77 y.o. F diagnosed with encephalopathy and presenting with ALOC. Patient was already mobilizing with nursing when PT arrived and PT continued mobilizing with chief limitation being fatigue with patient requesting to sit. Patient will benefit from continued skilled therapy in the acute setting. Functionally patient is suitable for home discharge, but concerns are cognitive in nature. Patient seems well kept and somewhat knowledgeable about her home situation, but does report she \"cannot remember\" everything including why she is in the hospital.    Plan    Physical Therapy Initial Treatment Plan   Treatment Plan : (P) Bed Mobility, Gait Training, Manual Therapy, Neuro Re-Education / Balance, Therapeutic Activities, Therapeutic Exercise  Treatment Frequency: (P) 2 Times per Week  Duration: (P) Until Therapy Goals Met    DC Equipment Recommendations: (P) Unable to determine at this time  Discharge Recommendations: (P) Anticipate that the patient will have no further physical therapy needs after discharge from the hospital       Abridged Subjective/Objective     03/25/23 1132    Services   Is patient using  services for this encounter? No   Initial Contact Note    Initial Contact Note Order Received and Verified, Physical Therapy Evaluation in Progress with Full Report to Follow.   Precautions   Precautions Fall Risk   Pain 0 - 10 Group   Therapist Pain Assessment Post Activity Pain Same as Prior to Activity   Prior Living Situation   Housing / Facility 1 Story Apartment / Condo   Lives with - Patient's Self Care Capacity   (pt lives alone, but reports a friend comes often to help her)   Prior Level of Functional Mobility   Bed Mobility Independent   Transfer Status " Independent   Ambulation Independent   History of Falls   History of Falls No   Date of Last Fall   (pt reports no falls)   Cognition    Level of Consciousness Alert   Passive ROM Upper Body   Passive ROM Upper Body WDL   Strength Upper Body   Upper Body Strength  WDL   Comments functionally observed   Passive ROM Lower Body   Passive ROM Lower Body WDL   Strength Lower Body   Lower Body Strength  WDL   Comments functionally observed   Coordination Upper Body   Coordination WDL   Coordination Lower Body    Coordination Lower Body  WDL   Balance Assessment   Sitting Balance (Static) Fair +   Sitting Balance (Dynamic) Fair   Standing Balance (Static) Fair -   Standing Balance (Dynamic) Fair -   Weight Shift Sitting Fair   Weight Shift Standing Fair   Gait Analysis   Gait Level Of Assist Contact Guard Assist   Assistive Device Front Wheel Walker;None   Comments pt ambulated to restroom and back to chair with PT; patient was already being mobilized by nurses when PT arrived; pt ambulated partially with HHA;   Functional Mobility   Sit to Stand Supervised   Activity Tolerance   Sitting in Chair >10 min; pt left seated in bedside chair;   Standing >6 min;   Short Term Goals    Short Term Goal # 1 pt will ambulate 150 ft with no ADL with SPV within 6 visits for community ambulation   Education Group   Education Provided Role of Physical Therapist   Role of Physical Therapist Patient Response Patient;Acceptance;Explanation;Verbal Demonstration   Physical Therapy Initial Treatment Plan    Treatment Plan  Bed Mobility;Gait Training;Manual Therapy;Neuro Re-Education / Balance;Therapeutic Activities;Therapeutic Exercise   Treatment Frequency 2 Times per Week   Duration Until Therapy Goals Met   Problem List    Problems Impaired Ambulation   Anticipated Discharge Equipment and Recommendations   DC Equipment Recommendations Unable to determine at this time   Discharge Recommendations Anticipate that the patient will have no  further physical therapy needs after discharge from the hospital   Interdisciplinary Plan of Care Collaboration   IDT Collaboration with  Nursing   Patient Position at End of Therapy Seated;Chair Alarm On;Call Light within Reach;Tray Table within Reach   Collaboration Comments Ayden updated   Session Information   Date / Session Number  3/25-1 (1/2; 3/31)

## 2023-03-25 NOTE — ASSESSMENT & PLAN NOTE
Control improving  Continue on Norvasc. Increase metoprolol. Added oral hydralazine with hold parameters  Holding lisinopril for SCOTT  IV as needed ordered

## 2023-03-25 NOTE — THERAPY
"Occupational Therapy   Initial Evaluation     Patient Name: Tania Madison  Age:  77 y.o., Sex:  female  Medical Record #: [id number]  Today's Date: 3/25/2023     Precautions  Precautions: Fall Risk    Assessment  Patient is 77 y.o. female who presents to [facility] for ALOC. PMH includes dementia, will  need to clarify PLOF. Pt demo'd BADLs at SBA/CGA level, would likely improve function in natural environment. Question pts ability to perform IADLs. Pt reports she has a friend Liam who \"checks on her\" and \"stays with her sometimes\" as needed. Pt would benefit from SPV following DC. Will continue to follow while in house.     Plan    Occupational Therapy Initial Treatment Plan   Treatment Interventions: (P) Self Care / Activities of Daily Living, Neuro Re-Education / Balance, Therapeutic Exercises, Therapeutic Activity, Adaptive Equipment  Treatment Frequency: (P) 2 Times per Week  Duration: (P) Until Therapy Goals Met    DC Equipment Recommendations: (P) Unable to determine at this time  Discharge Recommendations: (P) Recommend home health for continued occupational therapy services     Subjective    \"I want to keep my apartment\"     Objective       03/25/23 1131   Charge Group   OT Evaluation OT Evaluation Low   Total Time Spent   OT Evaluation (Minutes) 20   OT Total Time Spent (Calculated) 20   Initial Contact Note    Initial Contact Note Order Received and Verified, Occupational Therapy Evaluation in Progress with Full Report to Follow.   Prior Living Situation   Prior Services None   Housing / Facility 1 Story Apartment / Condo   Comments Pt reports a friend named Liam sometimes comes over and stays with her, family is in the [place] area   Prior Level of ADL Function   Self Feeding Independent   Grooming / Hygiene Independent   Bathing Independent   Dressing Independent   Toileting Independent   Prior Level of IADL Function   Comments question pts ability to perform higher level IADLs   Precautions "   Precautions Fall Risk   Vitals   O2 (LPM) 0   O2 Delivery Device None - Room Air   Pain 0 - 10 Group   Therapist Pain Assessment Post Activity Pain Same as Prior to Activity;Nurse Notified  (no c/o pain during session)   Cognition    Cognition / Consciousness X   Speech/ Communication Delayed Responses   Level of Consciousness Alert   Safety Awareness Impaired   New Learning Impaired   Comments Pt pleasent and cooperative, might be a langauge barrier, hx of dementia, likely does better at home   Active ROM Upper Body   Active ROM Upper Body  WDL   Strength Upper Body   Upper Body Strength  WDL   Coordination Upper Body   Coordination WDL   Balance Assessment   Sitting Balance (Static) Fair +   Sitting Balance (Dynamic) Fair   Standing Balance (Static) Fair -   Standing Balance (Dynamic) Fair -   Weight Shift Sitting Fair   Weight Shift Standing Fair   Comments w/ B UE support, pt had morro LOB in bathroom, required physical assist to correct   Bed Mobility    Supine to Sit   (up w/  RN pre, in chair post)   Scooting Contact Guard Assist   ADL Assessment   Grooming Standing;Contact Guard Assist  (washed hands, brushed hair, applied lotion)   Lower Body Dressing Standby Assist  (cues to sequence donning socks)   Toileting Contact Guard Assist   How much help from another person does the patient currently need...   Putting on and taking off regular lower body clothing? 3   Bathing (including washing, rinsing, and drying)? 3   Toileting, which includes using a toilet, bedpan, or urinal? 3   Putting on and taking off regular upper body clothing? 3   Taking care of personal grooming such as brushing teeth? 3   Eating meals? 3   6 Clicks Daily Activity Score 18   Functional Mobility   Sit to Stand Standby Assist   Bed, Chair, Wheelchair Transfer Contact Guard Assist   Toilet Transfers Contact Guard Assist   Mobility within room and bathroom, w/ and w/out FWW   Activity Tolerance   Sitting in Chair 10+ min (up post)    Sitting Edge of Bed 5 min   Standing 6 min   Comments no overt s/s of fatigue   Patient / Family Goals   Patient / Family Goal #1 I want to keep my apartment   Short Term Goals   Short Term Goal # 1 Pt will demo toilet hygiene w/ SPV   Short Term Goal # 2 Pt will demo toilet txf w/ SPV   Education Group   Role of Occupational Therapist Patient Response Patient;Acceptance;Explanation;Demonstration;Verbal Demonstration;Reinforcement Needed   Occupational Therapy Initial Treatment Plan    Treatment Interventions Self Care / Activities of Daily Living;Neuro Re-Education / Balance;Therapeutic Exercises;Therapeutic Activity;Adaptive Equipment   Treatment Frequency 2 Times per Week   Duration Until Therapy Goals Met   Problem List   Problem List Decreased Active Daily Living Skills;Decreased Homemaking Skills;Decreased Functional Mobility;Decreased Activity Tolerance;Safety Awareness Deficits / Cognition;Impaired Postural Control / Balance   Anticipated Discharge Equipment and Recommendations   DC Equipment Recommendations Unable to determine at this time   Discharge Recommendations Recommend home health for continued occupational therapy services   Interdisciplinary Plan of Care Collaboration   IDT Collaboration with  Nursing;Physical Therapist   Patient Position at End of Therapy Seated;Chair Alarm On;Call Light within Reach;Tray Table within Reach;Phone within Reach   Collaboration Comments report given   Session Information   Date / Session Number  3/25, 1 (1/2, 3/31)

## 2023-03-25 NOTE — ED NOTES
Med Rec complete per patient  Allergies reviewed with patient  Patient denies taking any RX or OTC meds in the last 30 days  [facility]  Pharmacy states they have not filled any medications for her in the last 30 days.

## 2023-03-25 NOTE — PROGRESS NOTES
Hospital Medicine Daily Progress Note    Date of Service  3/25/2023    Chief Complaint  Tania Madison is a 77 y.o. female admitted 3/24/2023 with confusion    Hospital Course  78 yo woman with dementia, DM, CVA, HTN, anxiety and depression who lives alone who was found by EMS with confusion. She was hypertensive to 243/107. CTH was unremarkable with nonfocal neuro exam. She was admitted for encephalopathy and hypertensive urgency. Vit B12, TSH, alcohol, UA, UDS, syphilis wnl    Interval Problem Update  HTN improved but now increased to 234/94. She did not get metop this morning, reschedule. Add oral hydralazine. IV PRN to be given.  She has N/V, remains disoriented . She remembers urinating in bed at home. I ordered cog eval.  Anemia 8. Iron is low. Stool occult test pending. IR iron ordered  Cardiomegaly on imaging and murmur on exam, I ordered TTE  I called emergency contact for Liam Anne, it was a wrong number    I have discussed this patient's plan of care and discharge plan at IDT rounds today with Case Management, Nursing, Nursing leadership, and other members of the IDT team.    Consultants/Specialty  none    Code Status  Full Code    Disposition  Patient is not medically cleared for discharge.   Anticipate discharge to to home with organized home healthcare and close outpatient follow-up.  I have placed the appropriate orders for post-discharge needs.    Review of Systems  Review of Systems   Constitutional:  Positive for malaise/fatigue.   Respiratory:  Negative for shortness of breath.    Cardiovascular:  Negative for chest pain.   Gastrointestinal:  Positive for nausea and vomiting.   Genitourinary:  Negative for dysuria.   Musculoskeletal:  Negative for myalgias.   Neurological:  Positive for weakness. Negative for dizziness and headaches.   Psychiatric/Behavioral:  Positive for memory loss.       Physical Exam  Temp:  [36.2 °C (97.2 °F)-37.1 °C (98.8 °F)] 36.4 °C (97.6 °F)  Pulse:  [50-77]  62  Resp:  [16-20] 17  BP: (150-256)/() 234/94  SpO2:  [93 %-99 %] 98 %    Physical Exam  Vitals and nursing note reviewed.   Constitutional:       Appearance: She is not diaphoretic.   HENT:      Head: Normocephalic.      Mouth/Throat:      Mouth: Mucous membranes are moist.   Eyes:      General:         Right eye: No discharge.         Left eye: No discharge.   Cardiovascular:      Rate and Rhythm: Normal rate and regular rhythm.      Heart sounds: Murmur heard.   Pulmonary:      Effort: No respiratory distress.      Breath sounds: No wheezing or rales.   Abdominal:      General: There is no distension.      Palpations: Abdomen is soft.      Tenderness: There is no abdominal tenderness. There is no guarding or rebound.   Musculoskeletal:         General: No swelling.      Cervical back: Neck supple.   Skin:     General: Skin is warm and dry.   Neurological:      Mental Status: She is alert.      Comments: Oriented to self, knows she is in a hospital.  Said [facility] in April for a month.  Did not know year.  Full strength in upper and lower extremities, no facial droop       Fluids  No intake or output data in the 24 hours ending 03/25/23 1456    Laboratory  Recent Labs     03/24/23  1511 03/25/23  0303   WBC 5.0 5.5   RBC 4.53 4.25   HEMOGLOBIN 8.6* 8.0*   HEMATOCRIT 30.9* 28.1*   MCV 68.2* 66.1*   MCH 19.0* 18.8*   MCHC 27.8* 28.5*   RDW 52.1* 50.3*   PLATELETCT 279 268   MPV 8.9* 9.5     Recent Labs     03/24/23  1511 03/25/23  0303   SODIUM 137 137   POTASSIUM 4.6 3.8   CHLORIDE 104 104   CO2 23 20   GLUCOSE 103* 114*   BUN 30* 26*   CREATININE 1.03 1.00   CALCIUM 9.5 8.7                   Imaging  CT-HEAD W/O   Final Result         1. No acute intracranial abnormality. No evidence of acute intracranial hemorrhage or mass lesion.                     DX-CHEST-PORTABLE (1 VIEW)   Final Result      1.  Cardiomegaly.   2.  Mild interstitial infiltrates and/or edema.      EC-ECHOCARDIOGRAM  COMPLETE W/O CONT    (Results Pending)        Assessment/Plan  * Encephalopathy- (present on admission)  Assessment & Plan  History of dementia but was living independently. Unclear what is her baseline  CT head negative for acute findings  Possible hypertensive encephalopathy  Vit B12, TSH, alcohol, UA, UDS, ammonia, syphilis wnl.  No sign of infection  Fall precautions, PT OT evaluation  May need placement    Nausea & vomiting  Assessment & Plan  In relation to hypertension  Abd exam is benign  LFTs are normal  Report of care    Anemia  Assessment & Plan  New anemia, has not had bleeding here  Iron deficient, IV iron ordered  Stool occult blood pending  Trend hemoglobin level    Type 2 diabetes mellitus (HCC)- (present on admission)  Assessment & Plan  She is not hyperglycemic  Check A1c    Depression- (present on admission)  Assessment & Plan  Continue sertraline    Dyslipidemia- (present on admission)  Assessment & Plan  Resume Lipitor    Hypertensive urgency- (present on admission)  Assessment & Plan  Continues to be uncontrolled  Continue on Norvasc, metoprolol, lisinopril.  I will add oral hydralazine  IV as needed ordered         VTE prophylaxis: enoxaparin ppx    I have performed a physical exam and reviewed and updated ROS and Plan today (3/25/2023). In review of yesterday's note (3/24/2023), there are no changes except as documented above.

## 2023-03-25 NOTE — H&P
Hospital Medicine History & Physical Note    Date of Service  3/24/2023    Primary Care Physician  Pcp Unknown      Code Status  Full Code    Chief Complaint  Chief Complaint   Patient presents with    Mental Status Change     Pt's  last saw her 2 weeks ago, today found her to be more confused than usual, putting strange objects in the freezer, etc. No unilateral neuro deficits, but pt not aware of the year or month. .Pt repetitive. Denies pain, no trauma noted.       History of Presenting Illness  Tania Madison is a 77 y.o. female with past medical history of dementia, type 2 diabetes, hypertension, CVA, anxiety and depression, elevated troponin who presented 3/24/2023 with worsening of confusion.  Patient lives by herself, her relatives apparently live in the [place].  She does not have children.    Apparently EMS was called by , who found patient more confused than usual, putting strange objects in the freezer.  She states she does not like to take medications.  Blood pressure 243/107.  No gross motor deficit on exam.  CT head without contrast negative for acute findings.  UDS negative, TSH is not elevated, syphilis screen is negative.  EKG looks the same as in 2019.  History is limited due to dementia.    I discussed the plan of care with patient.    Review of Systems  Review of Systems   Unable to perform ROS: Dementia     Past Medical History   has a past medical history of Chronic airway obstruction, not elsewhere classified, Depression, Hemorrhage, intracranial (HCC) (), Hypertension, and Pneumonia.    Surgical History   has no past surgical history on file.     Family History  family history includes Other in her father and mother.   Family history reviewed with patient. There is no family history that is pertinent to the chief complaint.     Social History   reports that she has never smoked. She does not have any smokeless tobacco history on file. She reports  that she does not drink alcohol and does not use drugs.    Allergies  No Known Allergies    Medications  Prior to Admission Medications   Prescriptions Last Dose Informant Patient Reported? Taking?   amLODIPine (NORVASC) 10 MG Tab   No No   Sig: Take 1 Tab by mouth every day.   aspirin EC 81 MG EC tablet   No No   Sig: Take 1 Tab by mouth every day.   atorvastatin (LIPITOR) 40 MG Tab   No No   Sig: Take 1 Tab by mouth every evening.   digoxin (LANOXIN) 125 MCG Tab   No No   Sig: Take 1 Tab by mouth every day at 6 PM.   famotidine (PEPCID) 20 MG Tab   No No   Sig: Take 1 Tab by mouth 1 time daily as needed (heartburn).   hydrOXYzine HCl (ATARAX) 10 MG Tab   No No   Sig: Take 1 Tab by mouth every 8 hours as needed (anxiety or itching).   lisinopril (PRINIVIL, ZESTRIL) 40 MG tablet   No No   Sig: Take 1 Tab by mouth every day.   metoprolol SR (TOPROL XL) 25 MG TABLET SR 24 HR   No No   Sig: Take 1 Tab by mouth every day.   sertraline (ZOLOFT) 25 MG tablet   No No   Sig: Take 1 Tab by mouth every day.   sertraline (ZOLOFT) 25 MG tablet   No No   Sig: Take 1 Tab by mouth every day.   triamcinolone acetonide (KENALOG) 0.1 % Ointment   No No   Sig: Apply 1 Application to affected area(s) 2 Times a Day.      Facility-Administered Medications: None       Physical Exam  Temp:  [36.7 °C (98 °F)] 36.7 °C (98 °F)  Pulse:  [68-77] 69  Resp:  [16-20] 16  BP: (182-256)/() 256/145  SpO2:  [93 %-96 %] 96 %  Blood Pressure : (!) 256/145   Temperature: 36.7 °C (98 °F)   Pulse: 69   Respiration: 16   Pulse Oximetry: 96 %       Physical Exam  Vitals and nursing note reviewed.   Constitutional:       General: She is not in acute distress.     Appearance: Normal appearance.   HENT:      Head: Normocephalic and atraumatic.      Nose: Nose normal.      Mouth/Throat:      Mouth: Mucous membranes are moist.   Eyes:      Extraocular Movements: Extraocular movements intact.      Pupils: Pupils are equal, round, and reactive to light.    Cardiovascular:      Rate and Rhythm: Normal rate and regular rhythm.   Pulmonary:      Effort: Pulmonary effort is normal.      Breath sounds: Normal breath sounds.   Abdominal:      General: Abdomen is flat. There is no distension.      Tenderness: There is no abdominal tenderness.   Musculoskeletal:         General: No swelling or deformity. Normal range of motion.      Cervical back: Normal range of motion and neck supple.   Skin:     General: Skin is warm and dry.      Coloration: Skin is pale.   Neurological:      General: No focal deficit present.      Mental Status: She is alert.      Comments: Disoriented in time place and situation.  No gross motor deficit.   Psychiatric:         Cognition and Memory: Cognition is impaired.       Laboratory:  Recent Labs     03/24/23  1511   WBC 5.0   RBC 4.53   HEMOGLOBIN 8.6*   HEMATOCRIT 30.9*   MCV 68.2*   MCH 19.0*   MCHC 27.8*   RDW 52.1*   PLATELETCT 279   MPV 8.9*     Recent Labs     03/24/23  1511   SODIUM 137   POTASSIUM 4.6   CHLORIDE 104   CO2 23   GLUCOSE 103*   BUN 30*   CREATININE 1.03   CALCIUM 9.5     Recent Labs     03/24/23  1511   ALTSGPT 18   ASTSGOT 33   ALKPHOSPHAT 62   TBILIRUBIN 0.2   GLUCOSE 103*         No results for input(s): NTPROBNP in the last 72 hours.      No results for input(s): TROPONINT in the last 72 hours.    Imaging:  CT-HEAD W/O   Final Result         1. No acute intracranial abnormality. No evidence of acute intracranial hemorrhage or mass lesion.                     DX-CHEST-PORTABLE (1 VIEW)   Final Result      1.  Cardiomegaly.   2.  Mild interstitial infiltrates and/or edema.          X-Ray:  I have personally reviewed the images and compared with prior images.    Assessment/Plan:  Justification for Admission Status  I anticipate this patient will require at least two midnights for appropriate medical management, necessitating inpatient admission because anemia and encephalopathy workup    Patient will need a Med/Surg bed on  NEUROLOGY service .  The need is secondary to encephalopathy.    * Encephalopathy- (present on admission)  Assessment & Plan  Probably progressing of dementia.  CT head negative for acute findings, no neurodeficit.  History is limited  TSH is not elevated, UA negative for infection.  We will check ammonia level, bladder scan, vitamin B12  Limit sedatives and opiates  Fall precautions, PT OT evaluation   consult for placement    Hypertensive urgency- (present on admission)  Assessment & Plan  Likely secondary to not taking medications  Resume home medications metoprolol, lisinopril, amlodipine  Monitor blood pressure    Anemia  Assessment & Plan  Microcytic anemia.  Hemoglobin down from 12     3 years ago to 8.6 today  Denies melena or blood in stool  We will check iron studies, FOBT, reticulocyte count  Outpatient GI evaluation    Type 2 diabetes mellitus (HCC)- (present on admission)  Assessment & Plan  Random blood sugar 103  Monitor blood sugar daily, consider insulin sliding scale    Depression- (present on admission)  Assessment & Plan  Restart sertraline    Dyslipidemia- (present on admission)  Assessment & Plan  Resume Lipitor        VTE prophylaxis: enoxaparin ppx

## 2023-03-25 NOTE — ASSESSMENT & PLAN NOTE
New anemia, has not had bleeding here  Iron deficient, IV iron ordered  Stool occult blood pending  Hgb stable

## 2023-03-25 NOTE — ED NOTES
Assist RN: Pt transported off unit with transport tech. Pt awake and breathing with even, unlabored respirations on RA at time of transfer.

## 2023-03-25 NOTE — ED NOTES
Bed ready for pt - [phone]. Attempted to call report, but told charge RN was not ready for report. Given call back number. Aware that transport has been arranged by [facility].

## 2023-03-25 NOTE — ED NOTES
Pt's niece called for update - states that pt does not have family in [place], & that there have been concerns for a while about pt's potentially advancing dementia. Family does not feel that pt is safe at home. To call back with pt's 's # that had sent her to the ED today.

## 2023-03-25 NOTE — CARE PLAN
Problem: Knowledge Deficit - Standard  Goal: Patient and family/care givers will demonstrate understanding of plan of care, disease process/condition, diagnostic tests and medications  Outcome: Progressing     Problem: Skin Integrity  Goal: Skin integrity is maintained or improved  Outcome: Progressing     Problem: Fall Risk  Goal: Patient will remain free from falls  Outcome: Progressing     Problem: Pain - Standard  Goal: Alleviation of pain or a reduction in pain to the patient’s comfort goal  Outcome: Progressing     Problem: Neuro Status  Goal: Neuro status will remain stable or improve  Outcome: Progressing     Problem: Self Care  Goal: Patient will have the ability to perform ADLs independently or with assistance (bathe, groom, dress, toilet and feed)  Outcome: Progressing     Problem: Risk for Aspiration  Goal: Patient's risk for aspiration will be absent or decrease  Outcome: Progressing     Problem: Urinary Elimination  Goal: Establish and maintain regular urinary output  Outcome: Progressing     Problem: Bowel Elimination  Goal: Establish and maintain regular bowel function  Outcome: Progressing     Problem: Mobility  Goal: Patient's capacity to carry out activities will improve  Outcome: Progressing   The patient is Stable - Low risk of patient condition declining or worsening    Shift Goals  Clinical Goals: safety, comfort, improved neuro assessment  Patient Goals: go home  Family Goals: jeannie    Progress made toward(s) clinical / shift goals:  pt remained free from falls/injury throughout the night. Neuro exam stable. Pt declines any pain or discomforts. Able to swallow safety    Patient is not progressing towards the following goals:

## 2023-03-25 NOTE — ASSESSMENT & PLAN NOTE
History of dementia but was living independently. Per family it sounds like she has dementia at baseline  CT head negative for acute findings  Possible hypertensive encephalopathy as well  Vit B12, TSH, alcohol, UA, UDS, ammonia, syphilis wnl.  No sign of infection  Fall precautions, PT OT evaluation  Cog eval  Will need group home placement

## 2023-03-25 NOTE — CARE PLAN
Problem: Bowel Elimination  Goal: Establish and maintain regular bowel function  Outcome: Progressing   The patient is Watcher - Medium risk of patient condition declining or worsening    Shift Goals  Clinical Goals: safety, comfort, improved neuro assessment  Patient Goals: go home  Family Goals: jeannie    Progress made toward(s) clinical / shift goals:      Patient is not progressing towards the following goals:

## 2023-03-25 NOTE — PROGRESS NOTES
4 Eyes Skin Assessment Completed by NYASIA Jackson and NYASIA Zimmerman.    Head WDL  Ears WDL  Nose WDL  Mouth WDL  Neck WDL  Breast/Chest WDL  Shoulder Blades WDL  Spine WDL  (R) Arm/Elbow/Hand WDL  (L) Arm/Elbow/Hand WDL  Abdomen WDL  Groin WDL  Scrotum/Coccyx/Buttocks WDL  (R) Leg WDL  (L) Leg WDL  (R) Heel/Foot/Toe WDL  (L) Heel/Foot/Toe Jaundice          Devices In Places SCD's      Interventions In Place Sacral Mepilex and Pillows    Possible Skin Injury No    Pictures Uploaded Into Epic N/A  Wound Consult Placed N/A  RN Wound Prevention Protocol Ordered Yes

## 2023-03-26 ENCOUNTER — APPOINTMENT (OUTPATIENT)
Dept: RADIOLOGY | Facility: MEDICAL CENTER | Age: 78
DRG: 071 | End: 2023-03-26
Attending: INTERNAL MEDICINE
Payer: MEDICARE

## 2023-03-26 ENCOUNTER — APPOINTMENT (OUTPATIENT)
Dept: CARDIOLOGY | Facility: MEDICAL CENTER | Age: 78
DRG: 071 | End: 2023-03-26
Attending: INTERNAL MEDICINE
Payer: MEDICARE

## 2023-03-26 PROBLEM — R01.1 MURMUR, CARDIAC: Status: ACTIVE | Noted: 2023-03-26

## 2023-03-26 LAB
ANION GAP SERPL CALC-SCNC: 13 MMOL/L (ref 7–16)
BASOPHILS # BLD AUTO: 0.2 % (ref 0–1.8)
BASOPHILS # BLD: 0.02 K/UL (ref 0–0.12)
BUN SERPL-MCNC: 40 MG/DL (ref 8–22)
CALCIUM SERPL-MCNC: 8.8 MG/DL (ref 8.5–10.5)
CHLORIDE SERPL-SCNC: 101 MMOL/L (ref 96–112)
CO2 SERPL-SCNC: 23 MMOL/L (ref 20–33)
CREAT SERPL-MCNC: 1.96 MG/DL (ref 0.5–1.4)
EOSINOPHIL # BLD AUTO: 0 K/UL (ref 0–0.51)
EOSINOPHIL NFR BLD: 0 % (ref 0–6.9)
ERYTHROCYTE [DISTWIDTH] IN BLOOD BY AUTOMATED COUNT: 50.1 FL (ref 35.9–50)
GFR SERPLBLD CREATININE-BSD FMLA CKD-EPI: 26 ML/MIN/1.73 M 2
GLUCOSE SERPL-MCNC: 113 MG/DL (ref 65–99)
HCT VFR BLD AUTO: 33.2 % (ref 37–47)
HGB BLD-MCNC: 9.5 G/DL (ref 12–16)
IMM GRANULOCYTES # BLD AUTO: 0.04 K/UL (ref 0–0.11)
IMM GRANULOCYTES NFR BLD AUTO: 0.3 % (ref 0–0.9)
LV EJECT FRACT MOD 2C 99903: 73.21
LV EJECT FRACT MOD 4C 99902: 79.84
LV EJECT FRACT MOD BP 99901: 76.71
LYMPHOCYTES # BLD AUTO: 1.35 K/UL (ref 1–4.8)
LYMPHOCYTES NFR BLD: 10.3 % (ref 22–41)
MCH RBC QN AUTO: 18.9 PG (ref 27–33)
MCHC RBC AUTO-ENTMCNC: 28.6 G/DL (ref 33.6–35)
MCV RBC AUTO: 66 FL (ref 81.4–97.8)
MONOCYTES # BLD AUTO: 0.46 K/UL (ref 0–0.85)
MONOCYTES NFR BLD AUTO: 3.5 % (ref 0–13.4)
NEUTROPHILS # BLD AUTO: 11.23 K/UL (ref 2–7.15)
NEUTROPHILS NFR BLD: 85.7 % (ref 44–72)
NRBC # BLD AUTO: 0 K/UL
NRBC BLD-RTO: 0 /100 WBC
PLATELET # BLD AUTO: 307 K/UL (ref 164–446)
PMV BLD AUTO: 9.5 FL (ref 9–12.9)
POTASSIUM SERPL-SCNC: 4 MMOL/L (ref 3.6–5.5)
RBC # BLD AUTO: 5.03 M/UL (ref 4.2–5.4)
SODIUM SERPL-SCNC: 137 MMOL/L (ref 135–145)
WBC # BLD AUTO: 13.1 K/UL (ref 4.8–10.8)

## 2023-03-26 PROCEDURE — 700102 HCHG RX REV CODE 250 W/ 637 OVERRIDE(OP): Performed by: INTERNAL MEDICINE

## 2023-03-26 PROCEDURE — 76775 US EXAM ABDO BACK WALL LIM: CPT

## 2023-03-26 PROCEDURE — A9270 NON-COVERED ITEM OR SERVICE: HCPCS | Performed by: INTERNAL MEDICINE

## 2023-03-26 PROCEDURE — 99233 SBSQ HOSP IP/OBS HIGH 50: CPT | Performed by: INTERNAL MEDICINE

## 2023-03-26 PROCEDURE — 93306 TTE W/DOPPLER COMPLETE: CPT

## 2023-03-26 PROCEDURE — 85025 COMPLETE CBC W/AUTO DIFF WBC: CPT

## 2023-03-26 PROCEDURE — 700111 HCHG RX REV CODE 636 W/ 250 OVERRIDE (IP): Performed by: INTERNAL MEDICINE

## 2023-03-26 PROCEDURE — 36415 COLL VENOUS BLD VENIPUNCTURE: CPT

## 2023-03-26 PROCEDURE — 80048 BASIC METABOLIC PNL TOTAL CA: CPT

## 2023-03-26 PROCEDURE — 770001 HCHG ROOM/CARE - MED/SURG/GYN PRIV*

## 2023-03-26 PROCEDURE — 93306 TTE W/DOPPLER COMPLETE: CPT | Mod: 26 | Performed by: INTERNAL MEDICINE

## 2023-03-26 PROCEDURE — 700105 HCHG RX REV CODE 258: Performed by: INTERNAL MEDICINE

## 2023-03-26 RX ADMIN — SODIUM CHLORIDE 250 MG: 9 INJECTION, SOLUTION INTRAVENOUS at 05:01

## 2023-03-26 RX ADMIN — AMLODIPINE BESYLATE 10 MG: 5 TABLET ORAL at 05:00

## 2023-03-26 RX ADMIN — ATORVASTATIN CALCIUM 40 MG: 40 TABLET, FILM COATED ORAL at 18:46

## 2023-03-26 RX ADMIN — ENOXAPARIN SODIUM 30 MG: 30 INJECTION SUBCUTANEOUS at 18:46

## 2023-03-26 RX ADMIN — ASPIRIN 81 MG: 81 TABLET, COATED ORAL at 05:00

## 2023-03-26 RX ADMIN — METOPROLOL SUCCINATE 25 MG: 25 TABLET, EXTENDED RELEASE ORAL at 18:46

## 2023-03-26 RX ADMIN — SODIUM CHLORIDE 250 MG: 9 INJECTION, SOLUTION INTRAVENOUS at 19:23

## 2023-03-26 RX ADMIN — SERTRALINE 25 MG: 50 TABLET, FILM COATED ORAL at 05:00

## 2023-03-26 RX ADMIN — HYDRALAZINE HYDROCHLORIDE 10 MG: 10 TABLET, FILM COATED ORAL at 21:19

## 2023-03-26 RX ADMIN — LISINOPRIL 40 MG: 20 TABLET ORAL at 05:00

## 2023-03-26 RX ADMIN — ONDANSETRON 4 MG: 2 INJECTION INTRAMUSCULAR; INTRAVENOUS at 03:12

## 2023-03-26 ASSESSMENT — ENCOUNTER SYMPTOMS
COUGH: 0
ABDOMINAL PAIN: 0
MEMORY LOSS: 1
WEAKNESS: 1
HEADACHES: 0
SHORTNESS OF BREATH: 0
VOMITING: 0
DIZZINESS: 0
NAUSEA: 0
HALLUCINATIONS: 0
DIARRHEA: 0
NERVOUS/ANXIOUS: 0
MYALGIAS: 0

## 2023-03-26 ASSESSMENT — PAIN DESCRIPTION - PAIN TYPE
TYPE: ACUTE PAIN

## 2023-03-26 NOTE — DISCHARGE PLANNING
Sister: Yudi [phone]  Niece: Anjana (Yudi's daughter) [phone]        Loretta NAJERA RN Case Manager  [phone]

## 2023-03-26 NOTE — PROGRESS NOTES
Hospital Medicine Daily Progress Note    Date of Service  3/26/2023    Chief Complaint  Tania Madison is a 77 y.o. female admitted 3/24/2023 with confusion    Hospital Course  78 yo woman with dementia, DM, CVA, HTN, anxiety and depression who lives alone who was found by EMS with confusion. She was hypertensive to 243/107. CTH was unremarkable with nonfocal neuro exam. She was admitted for encephalopathy and hypertensive urgency. Vit B12, TSH, alcohol, UA, UDS, syphilis wnl    Interval Problem Update  3/25 - HTN improved but now increased to 234/94. She did not get metop this morning, reschedule. Add oral hydralazine. IV PRN to be given.  She has N/V, remains disoriented . She remembers urinating in bed at home. I ordered cog eval.  Anemia 8. Iron is low. Stool occult test pending. IR iron ordered  Cardiomegaly on imaging and murmur on exam, I ordered TTE  I called emergency contact for Liam Anne, it was a wrong number    3/26 - HTN improved, 143/62. WBC 13.1, afebrile. Creat increased to 1.96. Hold lisinopril and check renal US. Had diarrhea yesterday, has not recurred. Denies N/V. TTE pending. She is pleasant and knows she's in the hospital, her friend brought her in. She doesn't know the friend's name and disoriented to month/year. She may have had outpatient , case management to follow up    I have discussed this patient's plan of care and discharge plan at IDT rounds today with Case Management, Nursing, Nursing leadership, and other members of the IDT team.    Consultants/Specialty  none    Code Status  Full Code    Disposition  Patient is not medically cleared for discharge.   Anticipate discharge to to home with organized home healthcare and close outpatient follow-up.  I have placed the appropriate orders for post-discharge needs.    Review of Systems  Review of Systems   Constitutional:  Negative for malaise/fatigue.   Respiratory:  Negative for cough and shortness of breath.     Cardiovascular:  Negative for chest pain.   Gastrointestinal:  Negative for abdominal pain, diarrhea, nausea and vomiting.   Genitourinary:  Negative for dysuria.   Musculoskeletal:  Negative for myalgias.   Neurological:  Positive for weakness. Negative for dizziness and headaches.   Psychiatric/Behavioral:  Positive for memory loss. Negative for hallucinations. The patient is not nervous/anxious.       Physical Exam  Temp:  [36.7 °C (98 °F)-37.2 °C (99 °F)] 36.7 °C (98 °F)  Pulse:  [59-78] 78  Resp:  [18] 18  BP: (136-234)/(42-94) 148/69  SpO2:  [96 %-98 %] 98 %    Physical Exam  Vitals and nursing note reviewed.   Constitutional:       Appearance: She is not ill-appearing or diaphoretic.   HENT:      Head: Normocephalic.      Mouth/Throat:      Mouth: Mucous membranes are moist.   Eyes:      General:         Right eye: No discharge.         Left eye: No discharge.   Cardiovascular:      Rate and Rhythm: Normal rate and regular rhythm.      Heart sounds: Murmur heard.   Pulmonary:      Effort: No respiratory distress.      Breath sounds: No wheezing or rales.   Abdominal:      General: There is no distension.      Palpations: Abdomen is soft.      Tenderness: There is no abdominal tenderness. There is no guarding or rebound.   Musculoskeletal:         General: No swelling.      Cervical back: Neck supple.   Skin:     General: Skin is warm and dry.   Neurological:      Mental Status: She is alert.      Comments: Oriented to self, knows she is in a hospital in [place]. Disoriented to month, year, president. Follows instructions and able to have conversation.   Full strength in upper and lower extremities, no facial droop   Psychiatric:      Comments: pleasant       Fluids    Intake/Output Summary (Last 24 hours) at 3/26/2023 1243  Last data filed at 3/26/2023 0300  Gross per 24 hour   Intake 428.8 ml   Output --   Net 428.8 ml       Laboratory  Recent Labs     03/24/23  1511 03/25/23  0303 03/26/23  0141   WBC 5.0  5.5 13.1*   RBC 4.53 4.25 5.03   HEMOGLOBIN 8.6* 8.0* 9.5*   HEMATOCRIT 30.9* 28.1* 33.2*   MCV 68.2* 66.1* 66.0*   MCH 19.0* 18.8* 18.9*   MCHC 27.8* 28.5* 28.6*   RDW 52.1* 50.3* 50.1*   PLATELETCT 279 268 307   MPV 8.9* 9.5 9.5     Recent Labs     03/24/23  1511 03/25/23  0303 03/26/23  0141   SODIUM 137 137 137   POTASSIUM 4.6 3.8 4.0   CHLORIDE 104 104 101   CO2 23 20 23   GLUCOSE 103* 114* 113*   BUN 30* 26* 40*   CREATININE 1.03 1.00 1.96*   CALCIUM 9.5 8.7 8.8                   Imaging  US-RENAL   Final Result      1.  No hydronephrosis.      2.  Increased renal echogenicity could indicate medical renal disease.      CT-HEAD W/O   Final Result         1. No acute intracranial abnormality. No evidence of acute intracranial hemorrhage or mass lesion.                     DX-CHEST-PORTABLE (1 VIEW)   Final Result      1.  Cardiomegaly.   2.  Mild interstitial infiltrates and/or edema.      EC-ECHOCARDIOGRAM COMPLETE W/O CONT    (Results Pending)        Assessment/Plan  * Encephalopathy- (present on admission)  Assessment & Plan  History of dementia but was living independently. Unclear what is her baseline. Case management to assist with contacting family/friends  CT head negative for acute findings  Possible hypertensive encephalopathy  Vit B12, TSH, alcohol, UA, UDS, ammonia, syphilis wnl.  No sign of infection  Fall precautions, PT OT evaluation  Cog eval  May need placement    Murmur, cardiac  Assessment & Plan  Cardiomegaly on imaging  TTE pending    Nausea & vomiting  Assessment & Plan  In relation to hypertension  Abd exam is benign  LFTs are normal  Resolved     Anemia  Assessment & Plan  New anemia, has not had bleeding here  Iron deficient, IV iron ordered  Stool occult blood pending  Hgb increased 9.5    SCOTT (acute kidney injury) (HCC)  Assessment & Plan  Worsening creat, may be from uncontrolled HTN  Stop lisinopril  Renal US showed echogenic kidneys, no obstruction  Trend BMP    Type 2 diabetes  mellitus (HCC)- (present on admission)  Assessment & Plan  History of  She is not hyperglycemic  A1c 5.8%, diet controlled    Depression- (present on admission)  Assessment & Plan  Continue sertraline    Dyslipidemia- (present on admission)  Assessment & Plan  Resume Lipitor    H/O: CVA (cerebrovascular accident)- (present on admission)  Assessment & Plan  Cont aspirin, statin    Hypertensive urgency- (present on admission)  Assessment & Plan  Control improving  Continue on Norvasc, metoprolol. Added oral hydralazine with hold parameters  Holding lisinopril for CSOTT  IV as needed ordered         VTE prophylaxis: enoxaparin ppx    I have performed a physical exam and reviewed and updated ROS and Plan today (3/26/2023). In review of yesterday's note (3/25/2023), there are no changes except as documented above.

## 2023-03-26 NOTE — CARE PLAN
The patient is Stable - Low risk of patient condition declining or worsening    Shift Goals  Clinical Goals: obtain c-diff sample  Patient Goals: Rest  Family Goals: OLIVIER    Progress made toward(s) clinical / shift goals:      Patient is not progressing towards the following goals:      Problem: Knowledge Deficit - Standard  Goal: Patient and family/care givers will demonstrate understanding of plan of care, disease process/condition, diagnostic tests and medications  Outcome: Progressing     Problem: Skin Integrity  Goal: Skin integrity is maintained or improved  Outcome: Progressing     Problem: Fall Risk  Goal: Patient will remain free from falls  Outcome: Progressing     Problem: Pain - Standard  Goal: Alleviation of pain or a reduction in pain to the patient’s comfort goal  Outcome: Progressing

## 2023-03-27 LAB
ANION GAP SERPL CALC-SCNC: 12 MMOL/L (ref 7–16)
BASOPHILS # BLD AUTO: 0.4 % (ref 0–1.8)
BASOPHILS # BLD: 0.05 K/UL (ref 0–0.12)
BUN SERPL-MCNC: 56 MG/DL (ref 8–22)
CALCIUM SERPL-MCNC: 9 MG/DL (ref 8.5–10.5)
CHLORIDE SERPL-SCNC: 100 MMOL/L (ref 96–112)
CO2 SERPL-SCNC: 23 MMOL/L (ref 20–33)
CREAT SERPL-MCNC: 1.72 MG/DL (ref 0.5–1.4)
EOSINOPHIL # BLD AUTO: 0 K/UL (ref 0–0.51)
EOSINOPHIL NFR BLD: 0 % (ref 0–6.9)
ERYTHROCYTE [DISTWIDTH] IN BLOOD BY AUTOMATED COUNT: 49 FL (ref 35.9–50)
GFR SERPLBLD CREATININE-BSD FMLA CKD-EPI: 31 ML/MIN/1.73 M 2
GLUCOSE SERPL-MCNC: 151 MG/DL (ref 65–99)
HCT VFR BLD AUTO: 32.4 % (ref 37–47)
HGB BLD-MCNC: 9.3 G/DL (ref 12–16)
IMM GRANULOCYTES # BLD AUTO: 0.05 K/UL (ref 0–0.11)
IMM GRANULOCYTES NFR BLD AUTO: 0.4 % (ref 0–0.9)
LYMPHOCYTES # BLD AUTO: 1.56 K/UL (ref 1–4.8)
LYMPHOCYTES NFR BLD: 13.4 % (ref 22–41)
MCH RBC QN AUTO: 19.1 PG (ref 27–33)
MCHC RBC AUTO-ENTMCNC: 28.7 G/DL (ref 33.6–35)
MCV RBC AUTO: 66.5 FL (ref 81.4–97.8)
MONOCYTES # BLD AUTO: 0.51 K/UL (ref 0–0.85)
MONOCYTES NFR BLD AUTO: 4.4 % (ref 0–13.4)
NEUTROPHILS # BLD AUTO: 9.44 K/UL (ref 2–7.15)
NEUTROPHILS NFR BLD: 81.4 % (ref 44–72)
NRBC # BLD AUTO: 0.05 K/UL
NRBC BLD-RTO: 0.4 /100 WBC
PLATELET # BLD AUTO: 286 K/UL (ref 164–446)
PMV BLD AUTO: 9.7 FL (ref 9–12.9)
POTASSIUM SERPL-SCNC: 4.3 MMOL/L (ref 3.6–5.5)
RBC # BLD AUTO: 4.87 M/UL (ref 4.2–5.4)
SODIUM SERPL-SCNC: 135 MMOL/L (ref 135–145)
WBC # BLD AUTO: 11.6 K/UL (ref 4.8–10.8)

## 2023-03-27 PROCEDURE — A9270 NON-COVERED ITEM OR SERVICE: HCPCS | Performed by: INTERNAL MEDICINE

## 2023-03-27 PROCEDURE — 770001 HCHG ROOM/CARE - MED/SURG/GYN PRIV*

## 2023-03-27 PROCEDURE — 92523 SPEECH SOUND LANG COMPREHEN: CPT

## 2023-03-27 PROCEDURE — 700102 HCHG RX REV CODE 250 W/ 637 OVERRIDE(OP): Performed by: INTERNAL MEDICINE

## 2023-03-27 PROCEDURE — 36415 COLL VENOUS BLD VENIPUNCTURE: CPT

## 2023-03-27 PROCEDURE — 99233 SBSQ HOSP IP/OBS HIGH 50: CPT | Performed by: INTERNAL MEDICINE

## 2023-03-27 PROCEDURE — 85025 COMPLETE CBC W/AUTO DIFF WBC: CPT

## 2023-03-27 PROCEDURE — 700111 HCHG RX REV CODE 636 W/ 250 OVERRIDE (IP): Performed by: INTERNAL MEDICINE

## 2023-03-27 PROCEDURE — 80048 BASIC METABOLIC PNL TOTAL CA: CPT

## 2023-03-27 RX ORDER — HEPARIN SODIUM 5000 [USP'U]/ML
5000 INJECTION, SOLUTION INTRAVENOUS; SUBCUTANEOUS EVERY 12 HOURS
Status: DISCONTINUED | OUTPATIENT
Start: 2023-03-27 | End: 2023-03-30 | Stop reason: HOSPADM

## 2023-03-27 RX ORDER — METOPROLOL SUCCINATE 25 MG/1
50 TABLET, EXTENDED RELEASE ORAL EVERY EVENING
Status: DISCONTINUED | OUTPATIENT
Start: 2023-03-27 | End: 2023-03-30 | Stop reason: HOSPADM

## 2023-03-27 RX ADMIN — METOPROLOL SUCCINATE 50 MG: 25 TABLET, EXTENDED RELEASE ORAL at 17:06

## 2023-03-27 RX ADMIN — HEPARIN SODIUM 5000 UNITS: 5000 INJECTION, SOLUTION INTRAVENOUS; SUBCUTANEOUS at 17:06

## 2023-03-27 RX ADMIN — SERTRALINE 25 MG: 50 TABLET, FILM COATED ORAL at 05:23

## 2023-03-27 RX ADMIN — ASPIRIN 81 MG: 81 TABLET, COATED ORAL at 05:23

## 2023-03-27 RX ADMIN — AMLODIPINE BESYLATE 10 MG: 5 TABLET ORAL at 05:23

## 2023-03-27 RX ADMIN — HYDRALAZINE HYDROCHLORIDE 10 MG: 10 TABLET, FILM COATED ORAL at 05:24

## 2023-03-27 RX ADMIN — ATORVASTATIN CALCIUM 40 MG: 40 TABLET, FILM COATED ORAL at 17:06

## 2023-03-27 ASSESSMENT — ENCOUNTER SYMPTOMS
NERVOUS/ANXIOUS: 0
MEMORY LOSS: 1
COUGH: 0
HALLUCINATIONS: 0
ABDOMINAL PAIN: 0
HEADACHES: 0
SHORTNESS OF BREATH: 0
NAUSEA: 0
MYALGIAS: 0
DIZZINESS: 0

## 2023-03-27 ASSESSMENT — PAIN DESCRIPTION - PAIN TYPE
TYPE: ACUTE PAIN

## 2023-03-27 ASSESSMENT — FIBROSIS 4 INDEX: FIB4 SCORE: 1.54

## 2023-03-27 NOTE — CARE PLAN
"The patient is Stable - Low risk of patient condition declining or worsening    Shift Goals  Clinical Goals: safety, SBP<180  Patient Goals: \"speak to my sister\"  Family Goals: jeannie    Progress made toward(s) clinical / shift goals:    Problem: Skin Integrity  Goal: Skin integrity is maintained or improved  Outcome: Progressing     Problem: Fall Risk  Goal: Patient will remain free from falls  Outcome: Progressing     Problem: Pain - Standard  Goal: Alleviation of pain or a reduction in pain to the patient’s comfort goal  Outcome: Progressing     Problem: Neuro Status  Goal: Neuro status will remain stable or improve  Outcome: Progressing     Problem: Mobility  Goal: Patient's capacity to carry out activities will improve  Outcome: Progressing       Patient is not progressing towards the following goals:      "

## 2023-03-27 NOTE — CARE PLAN
The patient is Stable - Low risk of patient condition declining or worsening    Shift Goals  Clinical Goals: SBP < 180, re-orient; q4h neuro checks  Patient Goals: Sleep  Family Goals: OLIVIER    Progress made toward(s) clinical / shift goals:   Bed in low, locked position near the nursing station, call light within reach. Bed alarm activated. Lap belt in place, patient demonstrates ability to remove lap belt.     Patient remains AO x 1-2, oriented to self and location of hospital.    Patient demonstrates ability to feed, groom and toilet self, with assistance to bathroom.    Patient is not progressing towards the following goals:  N/A

## 2023-03-27 NOTE — THERAPY
"Speech Language Pathology   Cognitive Evaluation     Patient Name: Tania Madison  AGE:  77 y.o., SEX:  female  Medical Record #: [id number]  Date of Service: 3/27/2023      History of Present Illness  78 y/o F admitted 3/24/23 with confusion. Pt lives alone. Pt was hypertensive on admission. CTH was unremarkable with non focal neuro exam. She was admitted for encephalopathy and hypertensive urgency. Vit B12, TSH, alcohol, UA, UDS, syphilis wnl.     PMHx includes dementia, DM, CVA, HTN, anxiety, depression.     General Information  Vitals  O2 Delivery Device: None - Room Air  Level of Consciousness: Alert  Patient Behaviors:  (Pleasantly confused, calm)  Orientation: Self, , General place  Follows Directives: Yes - simple commands only    Prior Living Situation & Level of Function  Lives with - Patient's Self Care Capacity: Alone and Unable to Care For Self     Communication: baseline cognitive impairment  Swallowing: WFL     Oral Mechanism Evaluation  Motor Speech: [facility]    Subjective  Pt reports she has been \"sick\" for a long time and this is why she cannot work. When asked to elaborate, pt states, \"oh something like the flu.\"  Per discussion w/ RN and MD, pt is no longer acutely encephalopathic.     Communication Domain(s)  Expressive Language: [facility]  Receptive Language: Mild  Cognitive-Linguistic: Severe  Reading: Moderate  Social/Pragmatic: [facility]    Assessment  The patient was seen this date for a cognitive-linguistic evaluation, including both formal and informal measures with formal results below. Patient presents with primary deficits in immediate/short term/working and long term memory, temporal orientation, simple reasoning, executive functioning, and problem solving. She was able to state her name and , but did not remember how old she is or have any guesses as to the current month/year. Patient had difficulty relaying long term memory facts as well, such as where her family lives or the names of any " neighbors/friends that help her. Pt did not know how she obtains groceries and states that her friend, Dr. Michael Jim, drives her to appointments. Given a 4 word recall task, pt needed 5 repetitions to recall 4/4 words immediately and did not recall any after a 5 minute delay or even remember being told 4 words to remember. During the clock drawing task, pt wrote #1-10 in the upper R quadrant and not place hands as instructed. Given an informal medication management task, pt orally read the instructions correctly but was not able to determine the correct frequency for when to take the medication. She agreed that living alone would be unsafe for her when given feedback re: testing and her limitations.     Cognistat  Orientation: Severe  Attention: Average  Comprehension: Moderate  Repetition:  (Mild-moderate)  Naming: Average  Memory: Severe  Calculations: Moderate  Similarities: Severe  Judgement: Moderate    CLQT Clock Drawin/13 = Severe    Clinical Impressions  Patient presents with overall moderate-severe cognitive-linguistic impairments that preclude her living independently. Cognitive impairments are likely baseline and consistent w/  her dementia diagnosis. Patient would benefit from long term placement in a supportive setting (e.g., memory care unit vs group home) where she can receive direct supervision.    NOTE: It is not within the scope of practice of Speech-Language Pathologists to determine patient capacity. Please defer to the physician or psych to complete this assessment.     Recommendations  Supervision Needs Upons Discharge: Direct assistance with IADLs (see below)  IADLs: Medication management, Financial management, Appointment management, Household chores, Cooking  Consult Referral(s): Other (see comments) (Social Work for long term placement)    SLP Treatment Plan  Treatment Plan: None Indicated (pt at baseline)  SLP Frequency: N/A - Evaluation Only  Estimated Duration: N/A - Evaluation  Only    Anticipated Discharge Needs  Discharge Recommendations: Anticipate that the patient will have no further speech therapy needs after discharge from the hospital (will need long term placement)  Therapy Recommendations Upon DC: Not Indicated    Ely Gonzalez MS,CCC-SLP

## 2023-03-27 NOTE — CARE PLAN
"The patient is Stable - Low risk of patient condition declining or worsening    Shift Goals  Clinical Goals: contact patient social workr  Patient Goals: Call friend  Family Goals: OLIVIER    Progress made toward(s) clinical / shift goals:  Pt AAOx2 through shift, very confused. Continually talking about her \"friend\". Shower today with CNA. Impulsive. Fall precautions in place with strip and frame alarm. Call bell in reach, re-educated on use during each round.      Problem: Knowledge Deficit - Standard  Goal: Patient and family/care givers will demonstrate understanding of plan of care, disease process/condition, diagnostic tests and medications  Outcome: Progressing     Problem: Skin Integrity  Goal: Skin integrity is maintained or improved  Outcome: Progressing     Problem: Fall Risk  Goal: Patient will remain free from falls  Outcome: Progressing     Problem: Pain - Standard  Goal: Alleviation of pain or a reduction in pain to the patient’s comfort goal  Outcome: Progressing     Problem: Neuro Status  Goal: Neuro status will remain stable or improve  Outcome: Progressing       Patient is not progressing towards the following goals:      "

## 2023-03-27 NOTE — DIETARY
"Nutrition services: Day 3 of admit.  Tania Madison is a 77 y.o. female with admitting DX of AMS.     Consult received for BMI <19. RD visited pt at bedside. Pt reports a great appetite and states that she eats everything. She does not report any weight loss. Pt with no further nutrition questions at this time.     Assessment:  Height: 144.8 cm (4' 9\")  Weight: 37.6 kg (82 lb 14.3 oz) via bed scale   Body mass index is 17.94 kg/m²., BMI classification: underweight   Diet/Intake: Regular % x2 meals with 1 meal recorded @ 0%    Evaluation:   Pt with BMI <19, pt with small frame noted, no overt signs of muscle or fat wasting. No recent weight history in chart however per patient no recent weight changes noted per pt.   Current clinical picture and MD progress notes reviewed.  Pt with PMHx of dementia, DM, CVA, HTN, anxiety and depression. Admitted for encephalopathy and hypertensive urgency.   Labs (3/27) Glu 151 (H), A1C (3/25) 5.8 (H)  Meds reviewed   Skin: no noted staged wounds or pressure injuries   +BM 3/25    Malnutrition Risk: Pt does not meet criteria per ASPEN guidelines     Recommendations/Plan:  Regular diet as tolerated    Encourage intake of all meals >50%   Document intake of all meals as % taken in ADL's to provide interdisciplinary communication across all shifts.   Monitor weight.  Nutrition rep will continue to see patient for ongoing meal and snack preferences.     RD to monitor per department policy         "
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
## 2023-03-27 NOTE — PROGRESS NOTES
Hospital Medicine Daily Progress Note    Date of Service  3/27/2023    Chief Complaint  Tania Madison is a 77 y.o. female admitted 3/24/2023 with confusion    Hospital Course  76 yo woman with dementia, DM, CVA, HTN, anxiety and depression who lives alone who was found by EMS with confusion. She was hypertensive to 243/107. CTH was unremarkable with nonfocal neuro exam. She was admitted for encephalopathy and hypertensive urgency. Vit B12, TSH, alcohol, ammonia, UA, UDS, syphilis wnl. TTE showed EF 75%, mild LA dilation. She developed SCOTT and her HTN medications are being adjusted. Discussed with family and she will need a group home.    Interval Problem Update  WBC decreased to 11.6. Creat improved to 1.72. Remains hypertensive, I will increase metoprolol. Continue to hold lisinopril until renal function stabilizes. She is pleasant but confused, denies HA, dizziness, CP SOB. I spoke with her niece Anjana who is point of contact since the patient sister does not speak English well. Patient has been confused with suspected dementia. She had previously refused to go to a group home. She had a friend staying with her, but was taking advantage of her and no longer lives there. They would like to place her in the same group home as her brother in [place]. Case management to follow up.    I have discussed this patient's plan of care and discharge plan at IDT rounds today with Case Management, Nursing, Nursing leadership, and other members of the IDT team.    Consultants/Specialty  none    Code Status  Full Code    Disposition  Patient is not medically cleared for discharge.   Anticipate discharge to to home with organized home healthcare and close outpatient follow-up.  I have placed the appropriate orders for post-discharge needs.    Review of Systems  Review of Systems   Constitutional:  Negative for malaise/fatigue.   Respiratory:  Negative for cough and shortness of breath.    Cardiovascular:  Negative for chest  pain.   Gastrointestinal:  Negative for abdominal pain and nausea.   Genitourinary:  Negative for dysuria.   Musculoskeletal:  Negative for myalgias.   Neurological:  Negative for dizziness and headaches.   Psychiatric/Behavioral:  Positive for memory loss. Negative for hallucinations. The patient is not nervous/anxious.       Physical Exam  Temp:  [36.1 °C (97 °F)-37 °C (98.6 °F)] 36.1 °C (97 °F)  Pulse:  [60-75] 60  Resp:  [17-18] 17  BP: (139-177)/(59-70) 152/59  SpO2:  [93 %-99 %] 99 %    Physical Exam  Vitals and nursing note reviewed.   Constitutional:       Appearance: She is not diaphoretic.   HENT:      Head: Normocephalic.      Mouth/Throat:      Mouth: Mucous membranes are moist.   Eyes:      General:         Right eye: No discharge.         Left eye: No discharge.   Cardiovascular:      Rate and Rhythm: Normal rate and regular rhythm.      Heart sounds: Murmur heard.   Pulmonary:      Effort: No respiratory distress.      Breath sounds: No wheezing or rales.   Abdominal:      General: There is no distension.      Palpations: Abdomen is soft.      Tenderness: There is no abdominal tenderness. There is no guarding or rebound.   Musculoskeletal:         General: No swelling.      Cervical back: Neck supple.   Skin:     General: Skin is warm and dry.   Neurological:      Mental Status: She is alert.      Comments: Oriented to self, knows she is in a hospital in [place]. Disoriented to month, year, president. Follows instructions and able to have conversation.   Full strength in upper and lower extremities, no facial droop   Psychiatric:      Comments: pleasant       Fluids    Intake/Output Summary (Last 24 hours) at 3/27/2023 1226  Last data filed at 3/27/2023 0600  Gross per 24 hour   Intake 229.23 ml   Output --   Net 229.23 ml       Laboratory  Recent Labs     03/25/23  0303 03/26/23  0141 03/27/23  0454   WBC 5.5 13.1* 11.6*   RBC 4.25 5.03 4.87   HEMOGLOBIN 8.0* 9.5* 9.3*   HEMATOCRIT 28.1* 33.2* 32.4*    MCV 66.1* 66.0* 66.5*   MCH 18.8* 18.9* 19.1*   MCHC 28.5* 28.6* 28.7*   RDW 50.3* 50.1* 49.0   PLATELETCT 268 307 286   MPV 9.5 9.5 9.7     Recent Labs     03/25/23  0303 03/26/23  0141 03/27/23  0454   SODIUM 137 137 135   POTASSIUM 3.8 4.0 4.3   CHLORIDE 104 101 100   CO2 20 23 23   GLUCOSE 114* 113* 151*   BUN 26* 40* 56*   CREATININE 1.00 1.96* 1.72*   CALCIUM 8.7 8.8 9.0                   Imaging  EC-ECHOCARDIOGRAM COMPLETE W/O CONT   Final Result      US-RENAL   Final Result      1.  No hydronephrosis.      2.  Increased renal echogenicity could indicate medical renal disease.      CT-HEAD W/O   Final Result         1. No acute intracranial abnormality. No evidence of acute intracranial hemorrhage or mass lesion.                     DX-CHEST-PORTABLE (1 VIEW)   Final Result      1.  Cardiomegaly.   2.  Mild interstitial infiltrates and/or edema.           Assessment/Plan  * Encephalopathy- (present on admission)  Assessment & Plan  History of dementia but was living independently. Per family it sounds like she has dementia at baseline  CT head negative for acute findings  Possible hypertensive encephalopathy as well  Vit B12, TSH, alcohol, UA, UDS, ammonia, syphilis wnl.  No sign of infection  Fall precautions, PT OT evaluation  Cog eval  Will need group home placement    Murmur, cardiac  Assessment & Plan  Cardiomegaly on imaging  TTE done, EF 75%, no significant valve disease    Nausea & vomiting  Assessment & Plan  In relation to hypertension  Abd exam is benign  LFTs are normal  Resolved     Anemia  Assessment & Plan  New anemia, has not had bleeding here  Iron deficient, IV iron ordered  Stool occult blood pending  Hgb stable    SCOTT (acute kidney injury) (HCC)  Assessment & Plan  Worsening creat, may be from uncontrolled HTN   lisinopril held  Renal US showed echogenic kidneys, no obstruction  Trend BMP    Type 2 diabetes mellitus (HCC)- (present on admission)  Assessment & Plan  History of  A1c 5.8%,  diet controlled    Depression- (present on admission)  Assessment & Plan  Continue sertraline    Dyslipidemia- (present on admission)  Assessment & Plan  Resume Lipitor    H/O: CVA (cerebrovascular accident)- (present on admission)  Assessment & Plan  Cont aspirin, statin    Hypertensive urgency- (present on admission)  Assessment & Plan  Control improving  Continue on Norvasc. Increase metoprolol. Added oral hydralazine with hold parameters  Holding lisinopril for SCOTT  IV as needed ordered         VTE prophylaxis: enoxaparin ppx    I have performed a physical exam and reviewed and updated ROS and Plan today (3/27/2023). In review of yesterday's note (3/26/2023), there are no changes except as documented above.

## 2023-03-28 LAB
ANION GAP SERPL CALC-SCNC: 8 MMOL/L (ref 7–16)
ANISOCYTOSIS BLD QL SMEAR: ABNORMAL
BASOPHILS # BLD AUTO: 0.5 % (ref 0–1.8)
BASOPHILS # BLD: 0.04 K/UL (ref 0–0.12)
BUN SERPL-MCNC: 49 MG/DL (ref 8–22)
CALCIUM SERPL-MCNC: 8.5 MG/DL (ref 8.5–10.5)
CHLORIDE SERPL-SCNC: 102 MMOL/L (ref 96–112)
CO2 SERPL-SCNC: 24 MMOL/L (ref 20–33)
COMMENT 1642: NORMAL
CREAT SERPL-MCNC: 1.33 MG/DL (ref 0.5–1.4)
EOSINOPHIL # BLD AUTO: 0.02 K/UL (ref 0–0.51)
EOSINOPHIL NFR BLD: 0.3 % (ref 0–6.9)
ERYTHROCYTE [DISTWIDTH] IN BLOOD BY AUTOMATED COUNT: 51.1 FL (ref 35.9–50)
GFR SERPLBLD CREATININE-BSD FMLA CKD-EPI: 42 ML/MIN/1.73 M 2
GLUCOSE SERPL-MCNC: 121 MG/DL (ref 65–99)
HCT VFR BLD AUTO: 26.3 % (ref 37–47)
HGB BLD-MCNC: 7.5 G/DL (ref 12–16)
HYPOCHROMIA BLD QL SMEAR: ABNORMAL
IMM GRANULOCYTES # BLD AUTO: 0.03 K/UL (ref 0–0.11)
IMM GRANULOCYTES NFR BLD AUTO: 0.4 % (ref 0–0.9)
LYMPHOCYTES # BLD AUTO: 2.04 K/UL (ref 1–4.8)
LYMPHOCYTES NFR BLD: 26.4 % (ref 22–41)
MCH RBC QN AUTO: 19.2 PG (ref 27–33)
MCHC RBC AUTO-ENTMCNC: 28.5 G/DL (ref 33.6–35)
MCV RBC AUTO: 67.3 FL (ref 81.4–97.8)
MICROCYTES BLD QL SMEAR: ABNORMAL
MONOCYTES # BLD AUTO: 0.63 K/UL (ref 0–0.85)
MONOCYTES NFR BLD AUTO: 8.2 % (ref 0–13.4)
MORPHOLOGY BLD-IMP: NORMAL
NEUTROPHILS # BLD AUTO: 4.97 K/UL (ref 2–7.15)
NEUTROPHILS NFR BLD: 64.2 % (ref 44–72)
NRBC # BLD AUTO: 0.06 K/UL
NRBC BLD-RTO: 0.8 /100 WBC
PLATELET # BLD AUTO: 208 K/UL (ref 164–446)
PLATELET BLD QL SMEAR: NORMAL
PMV BLD AUTO: 10 FL (ref 9–12.9)
POIKILOCYTOSIS BLD QL SMEAR: NORMAL
POTASSIUM SERPL-SCNC: 4.1 MMOL/L (ref 3.6–5.5)
RBC # BLD AUTO: 3.91 M/UL (ref 4.2–5.4)
RBC BLD AUTO: PRESENT
SODIUM SERPL-SCNC: 134 MMOL/L (ref 135–145)
TARGETS BLD QL SMEAR: NORMAL
WBC # BLD AUTO: 7.7 K/UL (ref 4.8–10.8)

## 2023-03-28 PROCEDURE — 700111 HCHG RX REV CODE 636 W/ 250 OVERRIDE (IP): Performed by: INTERNAL MEDICINE

## 2023-03-28 PROCEDURE — 700102 HCHG RX REV CODE 250 W/ 637 OVERRIDE(OP): Performed by: INTERNAL MEDICINE

## 2023-03-28 PROCEDURE — 36415 COLL VENOUS BLD VENIPUNCTURE: CPT

## 2023-03-28 PROCEDURE — 770001 HCHG ROOM/CARE - MED/SURG/GYN PRIV*

## 2023-03-28 PROCEDURE — 86480 TB TEST CELL IMMUN MEASURE: CPT

## 2023-03-28 PROCEDURE — 85025 COMPLETE CBC W/AUTO DIFF WBC: CPT

## 2023-03-28 PROCEDURE — A9270 NON-COVERED ITEM OR SERVICE: HCPCS | Performed by: INTERNAL MEDICINE

## 2023-03-28 PROCEDURE — 99232 SBSQ HOSP IP/OBS MODERATE 35: CPT | Performed by: INTERNAL MEDICINE

## 2023-03-28 PROCEDURE — 80048 BASIC METABOLIC PNL TOTAL CA: CPT

## 2023-03-28 RX ORDER — AMOXICILLIN 250 MG
2 CAPSULE ORAL 2 TIMES DAILY
Status: DISCONTINUED | OUTPATIENT
Start: 2023-03-28 | End: 2023-03-30 | Stop reason: HOSPADM

## 2023-03-28 RX ORDER — POLYETHYLENE GLYCOL 3350 17 G/17G
1 POWDER, FOR SOLUTION ORAL
Status: DISCONTINUED | OUTPATIENT
Start: 2023-03-28 | End: 2023-03-30 | Stop reason: HOSPADM

## 2023-03-28 RX ADMIN — HEPARIN SODIUM 5000 UNITS: 5000 INJECTION, SOLUTION INTRAVENOUS; SUBCUTANEOUS at 05:19

## 2023-03-28 RX ADMIN — SENNOSIDES AND DOCUSATE SODIUM 2 TABLET: 50; 8.6 TABLET ORAL at 18:22

## 2023-03-28 RX ADMIN — SERTRALINE 25 MG: 50 TABLET, FILM COATED ORAL at 05:18

## 2023-03-28 RX ADMIN — ATORVASTATIN CALCIUM 40 MG: 40 TABLET, FILM COATED ORAL at 16:59

## 2023-03-28 RX ADMIN — AMLODIPINE BESYLATE 10 MG: 5 TABLET ORAL at 05:19

## 2023-03-28 RX ADMIN — METOPROLOL SUCCINATE 50 MG: 25 TABLET, EXTENDED RELEASE ORAL at 16:59

## 2023-03-28 RX ADMIN — ASPIRIN 81 MG: 81 TABLET, COATED ORAL at 05:18

## 2023-03-28 ASSESSMENT — ENCOUNTER SYMPTOMS
MEMORY LOSS: 1
COUGH: 0
HEADACHES: 0
MYALGIAS: 0
DIZZINESS: 0
SHORTNESS OF BREATH: 0
NAUSEA: 0
NERVOUS/ANXIOUS: 0
HALLUCINATIONS: 0
ABDOMINAL PAIN: 0

## 2023-03-28 ASSESSMENT — PAIN DESCRIPTION - PAIN TYPE
TYPE: ACUTE PAIN
TYPE: ACUTE PAIN

## 2023-03-28 NOTE — CARE PLAN
"The patient is Stable - Low risk of patient condition declining or worsening    Shift Goals  Clinical Goals: BP control, safety  Patient Goals: \"speak to my sister\"  Family Goals: jeannie    Progress made toward(s) clinical / shift goals:     Problem: Skin Integrity  Goal: Skin integrity is maintained or improved  Outcome: Progressing   Skin remains intact.     Problem: Fall Risk  Goal: Patient will remain free from falls  Outcome: Progressing   No falls this shift.     Problem: Pain - Standard  Goal: Alleviation of pain or a reduction in pain to the patient’s comfort goal  Outcome: Progressing   No c/o pain this shift.   "

## 2023-03-28 NOTE — DISCHARGE PLANNING
Case Management Discharge Planning    Admission Date: 3/24/2023  GMLOS: 3.4  ALOS: 4    6-Clicks ADL Score: 18  6-Clicks Mobility Score: 21    Anticipated Discharge Dispo: Discharge Disposition: Discharged to home/self care (01)  Discharge Address: Pending group home    DME Needed: No    Action(s) Taken: Updated Provider/Nurse on Discharge Plan  Patient discussed during morning IDT rounds with team. Patient is not medically cleared for discharge at this time. Elif met with patient then met with LUISA. Patient is accepted to [facility], and when a bed opens at [facility] patient can be transferred to be in the same home as her brother (per family's request). Elif is familiar with patient's family as they have been attempting to admit her to [facility] for the past year. Elif requested for TB test, LUISA requested quantiferon    [facility]  [address]  [place] [address]    Elif (owner) [phone]  Fax [phone] (Per request, LUISA faxed H&P to Elif)    Escalations Completed: None    Medically Clear: No    Next Steps: Quantiferon gold results pending    Barriers to Discharge: Medical clearance and Pending Placement    Is the patient up for discharge tomorrow: No

## 2023-03-28 NOTE — PROGRESS NOTES
Hospital Medicine Daily Progress Note    Date of Service  3/28/2023    Chief Complaint  Tania Madison is a 77 y.o. female admitted 3/24/2023 with confusion    Hospital Course  78 yo woman with dementia, DM, CVA, HTN, anxiety and depression who lives alone who was found by EMS with confusion. She was hypertensive to 243/107. CTH was unremarkable with nonfocal neuro exam. She was admitted for encephalopathy and hypertensive urgency. Vit B12, TSH, alcohol, ammonia, UA, UDS, syphilis wnl. TTE showed EF 75%, mild LA dilation. She developed SCOTT and her HTN medications are being adjusted. Discussed with family and she will need a group home.    Interval Problem Update  3/27:WBC decreased to 11.6. Creat improved to 1.72. Remains hypertensive, I will increase metoprolol. Continue to hold lisinopril until renal function stabilizes. She is pleasant but confused, denies HA, dizziness, CP SOB. I spoke with her niece Anjana who is point of contact since the patient sister does not speak English well. Patient has been confused with suspected dementia. She had previously refused to go to a group home. She had a friend staying with her, but was taking advantage of her and no longer lives there. They would like to place her in the same group home as her brother in [place]. Case management to follow up.  3/28: Patient seen and examined afebrile sitting up in the chair, pleasantly confused no acute events overnight   CM working on placement     I have discussed this patient's plan of care and discharge plan at IDT rounds today with Case Management, Nursing, Nursing leadership, and other members of the IDT team.    Consultants/Specialty  none    Code Status  Full Code    Disposition  Patient is not medically cleared for discharge.   Anticipate discharge to to home with organized home healthcare and close outpatient follow-up.  I have placed the appropriate orders for post-discharge needs.    Review of Systems  Review of Systems    Constitutional:  Negative for malaise/fatigue.   Respiratory:  Negative for cough and shortness of breath.    Cardiovascular:  Negative for chest pain.   Gastrointestinal:  Negative for abdominal pain and nausea.   Genitourinary:  Negative for dysuria.   Musculoskeletal:  Negative for myalgias.   Neurological:  Negative for dizziness and headaches.   Psychiatric/Behavioral:  Positive for memory loss. Negative for hallucinations. The patient is not nervous/anxious.       Physical Exam  Temp:  [36.2 °C (97.2 °F)-36.7 °C (98 °F)] 36.2 °C (97.2 °F)  Pulse:  [58-75] 60  Resp:  [17-18] 18  BP: (141-154)/(50-59) 141/50  SpO2:  [90 %-98 %] 90 %    Physical Exam  Vitals and nursing note reviewed.   Constitutional:       Appearance: She is not diaphoretic.   HENT:      Head: Normocephalic.      Mouth/Throat:      Mouth: Mucous membranes are moist.   Eyes:      General:         Right eye: No discharge.         Left eye: No discharge.   Cardiovascular:      Rate and Rhythm: Normal rate and regular rhythm.      Heart sounds: Murmur heard.   Pulmonary:      Effort: No respiratory distress.      Breath sounds: No wheezing or rales.   Abdominal:      General: There is no distension.      Palpations: Abdomen is soft.      Tenderness: There is no abdominal tenderness. There is no guarding or rebound.   Musculoskeletal:         General: No swelling.      Cervical back: Neck supple.   Skin:     General: Skin is warm and dry.   Neurological:      Mental Status: She is alert.      Comments: Oriented to self, knows she is in a hospital in [place]. Disoriented to month, year, president. Follows instructions and able to have conversation.   Full strength in upper and lower extremities, no facial droop   Psychiatric:      Comments: pleasant       Fluids    Intake/Output Summary (Last 24 hours) at 3/28/2023 1229  Last data filed at 3/28/2023 1000  Gross per 24 hour   Intake 478 ml   Output --   Net 478 ml       Laboratory  Recent Labs      03/26/23  0141 03/27/23  0454 03/28/23  0201   WBC 13.1* 11.6* 7.7   RBC 5.03 4.87 3.91*   HEMOGLOBIN 9.5* 9.3* 7.5*   HEMATOCRIT 33.2* 32.4* 26.3*   MCV 66.0* 66.5* 67.3*   MCH 18.9* 19.1* 19.2*   MCHC 28.6* 28.7* 28.5*   RDW 50.1* 49.0 51.1*   PLATELETCT 307 286 208   MPV 9.5 9.7 10.0     Recent Labs     03/26/23  0141 03/27/23  0454 03/28/23  0201   SODIUM 137 135 134*   POTASSIUM 4.0 4.3 4.1   CHLORIDE 101 100 102   CO2 23 23 24   GLUCOSE 113* 151* 121*   BUN 40* 56* 49*   CREATININE 1.96* 1.72* 1.33   CALCIUM 8.8 9.0 8.5                   Imaging  EC-ECHOCARDIOGRAM COMPLETE W/O CONT   Final Result      US-RENAL   Final Result      1.  No hydronephrosis.      2.  Increased renal echogenicity could indicate medical renal disease.      CT-HEAD W/O   Final Result         1. No acute intracranial abnormality. No evidence of acute intracranial hemorrhage or mass lesion.                     DX-CHEST-PORTABLE (1 VIEW)   Final Result      1.  Cardiomegaly.   2.  Mild interstitial infiltrates and/or edema.           Assessment/Plan  * Encephalopathy- (present on admission)  Assessment & Plan  History of dementia but was living independently. Per family it sounds like she has dementia at baseline  CT head negative for acute findings  Possible hypertensive encephalopathy as well  Vit B12, TSH, alcohol, UA, UDS, ammonia, syphilis wnl.  No sign of infection  Fall precautions, PT OT evaluation  Cog eval  Will need group home placement    Murmur, cardiac  Assessment & Plan  Cardiomegaly on imaging  TTE done, EF 75%, no significant valve disease    Nausea & vomiting  Assessment & Plan  In relation to hypertension  Abd exam is benign  LFTs are normal  Resolved     Anemia  Assessment & Plan  New anemia, has not had bleeding here  Iron deficient, IV iron ordered  Stool occult blood pending  Hgb stable    SCOTT (acute kidney injury) (HCC)  Assessment & Plan  Worsening creat, may be from uncontrolled HTN   lisinopril held  Renal US  showed echogenic kidneys, no obstruction  Trend BMP    Type 2 diabetes mellitus (HCC)- (present on admission)  Assessment & Plan  History of  A1c 5.8%, diet controlled    Depression- (present on admission)  Assessment & Plan  Continue sertraline    Dyslipidemia- (present on admission)  Assessment & Plan  Resume Lipitor    H/O: CVA (cerebrovascular accident)- (present on admission)  Assessment & Plan  Cont aspirin, statin    Hypertensive urgency- (present on admission)  Assessment & Plan  Control improving  Continue on Norvasc. Increase metoprolol. Added oral hydralazine with hold parameters  Holding lisinopril for SCOTT  IV as needed ordered         VTE prophylaxis: enoxaparin ppx    I have performed a physical exam and reviewed and updated ROS and Plan today (3/28/2023). In review of yesterday's note (3/27/2023), there are no changes except as documented above.

## 2023-03-29 PROBLEM — R11.2 NAUSEA & VOMITING: Status: RESOLVED | Noted: 2023-03-25 | Resolved: 2023-03-29

## 2023-03-29 LAB
ANION GAP SERPL CALC-SCNC: 10 MMOL/L (ref 7–16)
BUN SERPL-MCNC: 42 MG/DL (ref 8–22)
CALCIUM SERPL-MCNC: 8.6 MG/DL (ref 8.5–10.5)
CHLORIDE SERPL-SCNC: 104 MMOL/L (ref 96–112)
CO2 SERPL-SCNC: 23 MMOL/L (ref 20–33)
CREAT SERPL-MCNC: 0.95 MG/DL (ref 0.5–1.4)
ERYTHROCYTE [DISTWIDTH] IN BLOOD BY AUTOMATED COUNT: 52.5 FL (ref 35.9–50)
GAMMA INTERFERON BACKGROUND BLD IA-ACNC: 0.04 IU/ML
GFR SERPLBLD CREATININE-BSD FMLA CKD-EPI: 62 ML/MIN/1.73 M 2
GLUCOSE SERPL-MCNC: 108 MG/DL (ref 65–99)
HCT VFR BLD AUTO: 27.3 % (ref 37–47)
HGB BLD-MCNC: 7.6 G/DL (ref 12–16)
M TB IFN-G BLD-IMP: NEGATIVE
M TB IFN-G CD4+ BCKGRND COR BLD-ACNC: 0.01 IU/ML
MCH RBC QN AUTO: 19.1 PG (ref 27–33)
MCHC RBC AUTO-ENTMCNC: 27.8 G/DL (ref 33.6–35)
MCV RBC AUTO: 68.6 FL (ref 81.4–97.8)
MITOGEN IGNF BCKGRD COR BLD-ACNC: >10 IU/ML
PLATELET # BLD AUTO: 193 K/UL (ref 164–446)
PMV BLD AUTO: 10 FL (ref 9–12.9)
POTASSIUM SERPL-SCNC: 4.2 MMOL/L (ref 3.6–5.5)
QFT TB2 - NIL TBQ2: 0 IU/ML
RBC # BLD AUTO: 3.98 M/UL (ref 4.2–5.4)
SODIUM SERPL-SCNC: 137 MMOL/L (ref 135–145)
WBC # BLD AUTO: 7.1 K/UL (ref 4.8–10.8)

## 2023-03-29 PROCEDURE — 36415 COLL VENOUS BLD VENIPUNCTURE: CPT

## 2023-03-29 PROCEDURE — 700102 HCHG RX REV CODE 250 W/ 637 OVERRIDE(OP): Performed by: INTERNAL MEDICINE

## 2023-03-29 PROCEDURE — 99232 SBSQ HOSP IP/OBS MODERATE 35: CPT | Performed by: HOSPITALIST

## 2023-03-29 PROCEDURE — A9270 NON-COVERED ITEM OR SERVICE: HCPCS | Performed by: INTERNAL MEDICINE

## 2023-03-29 PROCEDURE — 770001 HCHG ROOM/CARE - MED/SURG/GYN PRIV*

## 2023-03-29 PROCEDURE — 80048 BASIC METABOLIC PNL TOTAL CA: CPT

## 2023-03-29 PROCEDURE — 85027 COMPLETE CBC AUTOMATED: CPT

## 2023-03-29 PROCEDURE — 700111 HCHG RX REV CODE 636 W/ 250 OVERRIDE (IP): Performed by: INTERNAL MEDICINE

## 2023-03-29 RX ADMIN — HYDRALAZINE HYDROCHLORIDE 10 MG: 10 TABLET, FILM COATED ORAL at 13:34

## 2023-03-29 RX ADMIN — ASPIRIN 81 MG: 81 TABLET, COATED ORAL at 04:45

## 2023-03-29 RX ADMIN — SERTRALINE 25 MG: 50 TABLET, FILM COATED ORAL at 04:45

## 2023-03-29 RX ADMIN — AMLODIPINE BESYLATE 10 MG: 5 TABLET ORAL at 04:45

## 2023-03-29 RX ADMIN — ATORVASTATIN CALCIUM 40 MG: 40 TABLET, FILM COATED ORAL at 16:23

## 2023-03-29 RX ADMIN — METOPROLOL SUCCINATE 50 MG: 25 TABLET, EXTENDED RELEASE ORAL at 16:23

## 2023-03-29 RX ADMIN — HEPARIN SODIUM 5000 UNITS: 5000 INJECTION, SOLUTION INTRAVENOUS; SUBCUTANEOUS at 04:47

## 2023-03-29 RX ADMIN — HEPARIN SODIUM 5000 UNITS: 5000 INJECTION, SOLUTION INTRAVENOUS; SUBCUTANEOUS at 16:22

## 2023-03-29 RX ADMIN — SENNOSIDES AND DOCUSATE SODIUM 2 TABLET: 50; 8.6 TABLET ORAL at 16:23

## 2023-03-29 RX ADMIN — SENNOSIDES AND DOCUSATE SODIUM 2 TABLET: 50; 8.6 TABLET ORAL at 04:45

## 2023-03-29 RX ADMIN — HYDRALAZINE HYDROCHLORIDE 10 MG: 10 TABLET, FILM COATED ORAL at 21:21

## 2023-03-29 ASSESSMENT — PAIN DESCRIPTION - PAIN TYPE
TYPE: ACUTE PAIN
TYPE: ACUTE PAIN

## 2023-03-29 ASSESSMENT — ENCOUNTER SYMPTOMS
MEMORY LOSS: 1
SHORTNESS OF BREATH: 0
COUGH: 0
DIZZINESS: 0
MYALGIAS: 0
ABDOMINAL PAIN: 0
NAUSEA: 0
NERVOUS/ANXIOUS: 0
HEADACHES: 0
HALLUCINATIONS: 0

## 2023-03-29 NOTE — CARE PLAN
The patient is Stable - Low risk of patient condition declining or worsening    Shift Goals  Clinical Goals: BP control, Safety  Patient Goals: See family  Family Goals: jeannie    Progress made toward(s) clinical / shift goals:    Problem: Knowledge Deficit - Standard  Goal: Patient and family/care givers will demonstrate understanding of plan of care, disease process/condition, diagnostic tests and medications  Outcome: Progressing  Note: Pt able to demonstrate some understanding of plan of care.     Problem: Fall Risk  Goal: Patient will remain free from falls  Outcome: Progressing  Note: Pt does not call for assistance, bed alarm and chair alarm on, no slip socks on pt. Moving to desk bed.

## 2023-03-29 NOTE — DISCHARGE PLANNING
Case Management Discharge Planning    Admission Date: 3/24/2023  GMLOS: 3.4  ALOS: 5    6-Clicks ADL Score: 18  6-Clicks Mobility Score: 21      Anticipated Discharge Dispo: Discharge Disposition: Discharged to home/self care (01)  Discharge Address: Pending group home    DME Needed: No    Action(s) Taken: Updated Provider/Nurse on Discharge Plan  LUISA called patient's niece Anjana [phone] to update. Patient will admit to [facility] and one a bed opens at [facility] (where patient's brother lives) group home can transfer patient over. Anjana said her family will be thrilled with the plans. No other needs at this time.     LUISA received call from APS worker Angelo [phone] who inquired about discharge plans. Luisa informed patient will be discharged to group [place], Angelo to close out APS case as patient will not be discharge back home. LUISA to inform other APS worker Yoselyn [phone] upon discharge.     [facility]  [address]   [place] [address]     Elif (owner) [phone]  Fax [phone]     Escalations Completed: None    Medically Clear: No    Next Steps: Pending Quantiferon results. Transfer to group home     Barriers to Discharge: Medical clearance    Is the patient up for discharge tomorrow: No

## 2023-03-29 NOTE — PROGRESS NOTES
Hospital Medicine Daily Progress Note    Date of Service  3/29/2023    Chief Complaint  Tania Madison is a 77 y.o. female admitted 3/24/2023 with confusion    Hospital Course  76 yo woman with dementia, DM, CVA, HTN, anxiety and depression who lives alone who was found by EMS with confusion. She was hypertensive to 243/107. CTH was unremarkable with nonfocal neuro exam. She was admitted for encephalopathy and hypertensive urgency. Vit B12, TSH, alcohol, ammonia, UA, UDS, syphilis wnl. TTE showed EF 75%, mild LA dilation. She developed SCOTT and her HTN medications are being adjusted. Discussed with family and she will need a group home.    Interval Problem Update  No acute vents overnight  SCOTT resolved  We will resume lisinopril  Pending quant gold    I have discussed this patient's plan of care and discharge plan at IDT rounds today with Case Management, Nursing, Nursing leadership, and other members of the IDT team.    Consultants/Specialty  none    Code Status  Full Code    Disposition  Patient is medically cleared pending quantiferon results  for discharge.   Anticipate discharge to  group home .  I have placed the appropriate orders for post-discharge needs.    Review of Systems  Review of Systems   Constitutional:  Negative for malaise/fatigue.   Respiratory:  Negative for cough and shortness of breath.    Cardiovascular:  Negative for chest pain.   Gastrointestinal:  Negative for abdominal pain and nausea.   Genitourinary:  Negative for dysuria.   Musculoskeletal:  Negative for myalgias.   Neurological:  Negative for dizziness and headaches.   Psychiatric/Behavioral:  Positive for memory loss. Negative for hallucinations. The patient is not nervous/anxious.       Physical Exam  Temp:  [36.5 °C (97.7 °F)-37.1 °C (98.8 °F)] 36.6 °C (97.9 °F)  Pulse:  [50-56] 54  Resp:  [16-18] 16  BP: (131-158)/(49-61) 158/49  SpO2:  [95 %-97 %] 95 %    Physical Exam  Vitals and nursing note reviewed.   Constitutional:        Appearance: She is not diaphoretic.   HENT:      Head: Normocephalic.      Mouth/Throat:      Mouth: Mucous membranes are moist.   Eyes:      General:         Right eye: No discharge.         Left eye: No discharge.   Cardiovascular:      Rate and Rhythm: Normal rate and regular rhythm.      Heart sounds: Murmur heard.   Pulmonary:      Effort: No respiratory distress.      Breath sounds: No wheezing or rales.   Abdominal:      General: There is no distension.      Palpations: Abdomen is soft.      Tenderness: There is no abdominal tenderness. There is no guarding or rebound.   Musculoskeletal:         General: No swelling.      Cervical back: Neck supple.   Skin:     General: Skin is warm and dry.   Neurological:      Mental Status: She is alert.      Comments: Oriented to self, knows she is in a hospital in [place]. Disoriented to month, year, president. Follows instructions and able to have conversation.   Full strength in upper and lower extremities, no facial droop   Psychiatric:      Comments: pleasant       Fluids    Intake/Output Summary (Last 24 hours) at 3/29/2023 1259  Last data filed at 3/28/2023 1430  Gross per 24 hour   Intake 300 ml   Output --   Net 300 ml       Laboratory  Recent Labs     03/27/23  0454 03/28/23  0201 03/29/23  0335   WBC 11.6* 7.7 7.1   RBC 4.87 3.91* 3.98*   HEMOGLOBIN 9.3* 7.5* 7.6*   HEMATOCRIT 32.4* 26.3* 27.3*   MCV 66.5* 67.3* 68.6*   MCH 19.1* 19.2* 19.1*   MCHC 28.7* 28.5* 27.8*   RDW 49.0 51.1* 52.5*   PLATELETCT 286 208 193   MPV 9.7 10.0 10.0     Recent Labs     03/27/23  0454 03/28/23  0201 03/29/23  0335   SODIUM 135 134* 137   POTASSIUM 4.3 4.1 4.2   CHLORIDE 100 102 104   CO2 23 24 23   GLUCOSE 151* 121* 108*   BUN 56* 49* 42*   CREATININE 1.72* 1.33 0.95   CALCIUM 9.0 8.5 8.6                   Imaging  EC-ECHOCARDIOGRAM COMPLETE W/O CONT   Final Result      US-RENAL   Final Result      1.  No hydronephrosis.      2.  Increased renal echogenicity could indicate medical  renal disease.      CT-HEAD W/O   Final Result         1. No acute intracranial abnormality. No evidence of acute intracranial hemorrhage or mass lesion.                     DX-CHEST-PORTABLE (1 VIEW)   Final Result      1.  Cardiomegaly.   2.  Mild interstitial infiltrates and/or edema.           Assessment/Plan  * Encephalopathy- (present on admission)  Assessment & Plan  History of dementia but was living independently. Per family it sounds like she has dementia at baseline  CT head negative for acute findings  Possible hypertensive encephalopathy as well  Vit B12, TSH, alcohol, UA, UDS, ammonia, syphilis wnl.  No sign of infection  Fall precautions, PT OT evaluation  Cog eval  Will need group home placement    Murmur, cardiac  Assessment & Plan  Cardiomegaly on imaging  TTE done, EF 75%, no significant valve disease    Anemia  Assessment & Plan  New anemia, has not had bleeding here  Iron deficient, IV iron ordered  Stool occult blood pending  Hgb stable    SCOTT (acute kidney injury) (HCC)  Assessment & Plan  Resolved  Resume lisinopril    Type 2 diabetes mellitus (HCC)- (present on admission)  Assessment & Plan  History of  A1c 5.8%, diet controlled    Depression- (present on admission)  Assessment & Plan  Continue sertraline    Dyslipidemia- (present on admission)  Assessment & Plan  Resume Lipitor    H/O: CVA (cerebrovascular accident)- (present on admission)  Assessment & Plan  Cont aspirin, statin    Hypertensive urgency- (present on admission)  Assessment & Plan  Control improving  Continue on Norvasc. Increase metoprolol. Added oral hydralazine with hold parameters  Holding lisinopril for SCOTT  IV as needed ordered         VTE prophylaxis: enoxaparin ppx    I have performed a physical exam and reviewed and updated ROS and Plan today (3/29/2023). In review of yesterday's note (3/28/2023), there are no changes except as documented above.

## 2023-03-29 NOTE — CARE PLAN
The patient is Stable - Low risk of patient condition declining or worsening    Shift Goals  Clinical Goals: Safety/mobility  Patient Goals: See family  Family Goals: jeannie    Progress made toward(s) clinical / shift goals:    Problem: Fall Risk  Goal: Patient will remain free from falls  Outcome: Progressing   Fall precautions in place. Call light, bedside table, and pt belongings within reach.    Problem: Risk for Aspiration  Goal: Patient's risk for aspiration will be absent or decrease  Outcome: Progressing   Pt up to chair for breakfast and lunch, encourage pt to sit up to chair for dinner.     Patient is not progressing towards the following goals: N/A

## 2023-03-30 ENCOUNTER — PHARMACY VISIT (OUTPATIENT)
Dept: PHARMACY | Facility: MEDICAL CENTER | Age: 78
End: 2023-03-30
Payer: COMMERCIAL

## 2023-03-30 VITALS
DIASTOLIC BLOOD PRESSURE: 59 MMHG | HEIGHT: 57 IN | OXYGEN SATURATION: 98 % | RESPIRATION RATE: 18 BRPM | SYSTOLIC BLOOD PRESSURE: 169 MMHG | HEART RATE: 62 BPM | BODY MASS INDEX: 17.88 KG/M2 | TEMPERATURE: 98.1 F | WEIGHT: 82.89 LBS

## 2023-03-30 PROCEDURE — A9270 NON-COVERED ITEM OR SERVICE: HCPCS | Performed by: INTERNAL MEDICINE

## 2023-03-30 PROCEDURE — 99239 HOSP IP/OBS DSCHRG MGMT >30: CPT | Performed by: HOSPITALIST

## 2023-03-30 PROCEDURE — 700102 HCHG RX REV CODE 250 W/ 637 OVERRIDE(OP): Performed by: INTERNAL MEDICINE

## 2023-03-30 PROCEDURE — RXMED WILLOW AMBULATORY MEDICATION CHARGE: Performed by: HOSPITALIST

## 2023-03-30 PROCEDURE — 97535 SELF CARE MNGMENT TRAINING: CPT | Mod: CO

## 2023-03-30 PROCEDURE — 700111 HCHG RX REV CODE 636 W/ 250 OVERRIDE (IP): Performed by: INTERNAL MEDICINE

## 2023-03-30 RX ORDER — FAMOTIDINE 20 MG/1
20 TABLET, FILM COATED ORAL
Qty: 30 TABLET | Refills: 0 | Status: SHIPPED | OUTPATIENT
Start: 2023-03-30 | End: 2023-05-30

## 2023-03-30 RX ORDER — METOPROLOL SUCCINATE 50 MG/1
50 TABLET, EXTENDED RELEASE ORAL EVERY EVENING
Qty: 90 TABLET | Refills: 0 | Status: SHIPPED | OUTPATIENT
Start: 2023-03-30 | End: 2023-05-01

## 2023-03-30 RX ORDER — AMOXICILLIN 250 MG
2 CAPSULE ORAL 2 TIMES DAILY
Qty: 30 TABLET | Refills: 0 | Status: SHIPPED | OUTPATIENT
Start: 2023-03-30 | End: 2023-05-01

## 2023-03-30 RX ORDER — ASPIRIN 81 MG/1
81 TABLET ORAL DAILY
Qty: 90 TABLET | Refills: 1 | Status: SHIPPED | OUTPATIENT
Start: 2023-03-30 | End: 2023-05-01

## 2023-03-30 RX ORDER — ATORVASTATIN CALCIUM 40 MG/1
40 TABLET, FILM COATED ORAL EVERY EVENING
Qty: 90 TABLET | Refills: 1 | Status: SHIPPED | OUTPATIENT
Start: 2023-03-30 | End: 2023-05-01

## 2023-03-30 RX ORDER — SERTRALINE HYDROCHLORIDE 25 MG/1
25 TABLET, FILM COATED ORAL DAILY
Qty: 90 TABLET | Refills: 0 | Status: SHIPPED | OUTPATIENT
Start: 2023-03-30 | End: 2023-05-01

## 2023-03-30 RX ORDER — AMLODIPINE BESYLATE 10 MG/1
10 TABLET ORAL DAILY
Qty: 90 TABLET | Refills: 0 | Status: SHIPPED | OUTPATIENT
Start: 2023-03-31 | End: 2023-04-17

## 2023-03-30 RX ORDER — LISINOPRIL 40 MG/1
40 TABLET ORAL DAILY
Qty: 90 TABLET | Refills: 0 | Status: SHIPPED | OUTPATIENT
Start: 2023-03-30 | End: 2023-05-01

## 2023-03-30 RX ADMIN — HEPARIN SODIUM 5000 UNITS: 5000 INJECTION, SOLUTION INTRAVENOUS; SUBCUTANEOUS at 05:46

## 2023-03-30 RX ADMIN — ASPIRIN 81 MG: 81 TABLET, COATED ORAL at 05:45

## 2023-03-30 RX ADMIN — HYDRALAZINE HYDROCHLORIDE 10 MG: 10 TABLET, FILM COATED ORAL at 05:45

## 2023-03-30 RX ADMIN — LISINOPRIL 40 MG: 20 TABLET ORAL at 05:45

## 2023-03-30 RX ADMIN — AMLODIPINE BESYLATE 10 MG: 5 TABLET ORAL at 05:45

## 2023-03-30 RX ADMIN — SENNOSIDES AND DOCUSATE SODIUM 2 TABLET: 50; 8.6 TABLET ORAL at 05:46

## 2023-03-30 RX ADMIN — SERTRALINE 25 MG: 50 TABLET, FILM COATED ORAL at 05:45

## 2023-03-30 ASSESSMENT — COGNITIVE AND FUNCTIONAL STATUS - GENERAL
DRESSING REGULAR UPPER BODY CLOTHING: A LITTLE
EATING MEALS: A LITTLE
DRESSING REGULAR LOWER BODY CLOTHING: A LITTLE
TOILETING: A LITTLE
HELP NEEDED FOR BATHING: A LITTLE
PERSONAL GROOMING: A LITTLE
SUGGESTED CMS G CODE MODIFIER DAILY ACTIVITY: CK
DAILY ACTIVITIY SCORE: 18

## 2023-03-30 ASSESSMENT — PAIN DESCRIPTION - PAIN TYPE: TYPE: ACUTE PAIN

## 2023-03-30 NOTE — FACE TO FACE
Face to Face Supporting Documentation - Home Health    The encounter with this patient was in whole or in part the primary reason for home health admission.    Date of encounter:   Patient:                    MRN:                       YOB: 2021  Tania Madison  [id number]  1945     Home health to see patient for:  Skilled Nursing care for assessment, interventions & education, Physical Therapy evaluation and treatment, and Occupational therapy evaluation and treatment    Skilled need for:  New Onset Medical Diagnosis confusion    Skilled nursing interventions to include:  Comment: as above    Homebound status evidenced by:  Need the aid of supportive devices such as crutches, canes, wheelchairs or walkers or Needs the assistance of another person in order to leave the home. Leaving home requires a considerable and taxing effort. There is a normal inability to leave the home.    Community Physician to provide follow up care: Pcp Unknown     Optional Interventions? No      I certify the face to face encounter for this home health care referral meets the CMS requirements and the encounter/clinical assessment with the patient was, in whole, or in part, for the medical condition(s) listed above, which is the primary reason for home health care. Based on my clinical findings: the service(s) are medically necessary, support the need for home health care, and the homebound criteria are met.  I certify that this patient has had a face to face encounter by myself.  Willard Iglesias M.D. - SHAHNAZI: [phone]

## 2023-03-30 NOTE — DISCHARGE PLANNING
Case Management Discharge Planning    Admission Date: 3/24/2023  GMLOS: 3.4  ALOS: 6    6-Clicks ADL Score: 18  6-Clicks Mobility Score: 21    Anticipated Discharge Dispo: Discharge Disposition: Discharged to home/self care (01)  Discharge Address: Pending group home    DME Needed: No    Action(s) Taken: Updated Provider/Nurse on Discharge Plan  Patient discussed during morning IDT rounds with team. Patient is medically cleared for discharge at this time. Patient's quaniferon results came back negative. LUISA called El[facility] to inform patient can transfer, plan for Elif to  patient at 12pm. Elif requested meds to bed, bedside nurse informed. SW spoke to patient to inform, patient in agreement. SW called patient's family niece Anjana to update, Anjana updated her mother (patient's sister). IMM discussed, verbal from Anjana. LUISA called APS SW Yoselyn to inform, no answer, SW left call back message. No other needs at this time.     [facility]  [address]   [place] [address]     Elif (owner) [phone]  Fax [phone]    Escalations Completed: None    Medically Clear: Yes    Next Steps: Elif picking up patient at noon today     Barriers to Discharge: None

## 2023-03-30 NOTE — DISCHARGE PLANNING
Meds-to-Beds: Discharge prescription orders listed below delivered to patient's bedside [facility]AS[facility] Barton. Written information regarding the dispensed prescriptions was provided to the patient including the phone number of the pharmacy to call for any questions.       Current Outpatient Medications   Medication Sig Dispense Refill    [START ON 3/31/2023] amLODIPine (NORVASC) 10 MG Tab Take 1 Tablet by mouth every day. 90 Tablet 0    aspirin 81 MG EC tablet Take 1 Tablet by mouth every day. 90 Tablet 1    atorvastatin (LIPITOR) 40 MG Tab Take 1 Tablet by mouth every evening. 90 Tablet 1    famotidine (PEPCID) 20 MG Tab Take 1 Tablet by mouth 1 time a day as needed (heartburn). 30 Tablet 0    lisinopril (PRINIVIL) 40 MG tablet Take 1 Tablet by mouth every day. 90 Tablet 0    metoprolol SR (TOPROL XL) 50 MG TABLET SR 24 HR Take 1 Tablet by mouth every evening. 90 Tablet 0    sertraline (ZOLOFT) 25 MG tablet Take 1 Tablet by mouth every day. 90 Tablet 0    senna-docusate (PERICOLACE OR SENOKOT S) 8.6-50 MG Tab Take 2 Tablets by mouth 2 times a day. 30 Tablet 0      Brook Donato, PharmD

## 2023-03-30 NOTE — DISCHARGE PLANNING
Received Choice form @: 1300  Agency/Facility Name: [facility]   Referral sent per Choice form @: [id number]

## 2023-03-30 NOTE — CARE PLAN
The patient is Stable - Low risk of patient condition declining or worsening    Shift Goals  Clinical Goals: Safety, Stable neuro exams  Patient Goals: Eat dinner, sleep  Family Goals: OLIVIER    Progress made toward(s) clinical / shift goals:  Patient is AAOx1, this might be patient's baseline, she has a history of dementia. She is able to communicate her needs.Education provided on safety and using her light for assistance. Patient verbalizes understanding but she does not use call light. Patient has desk bed for monitoring. Bed low, locked and call light in reach.    Problem: Skin Integrity  Goal: Skin integrity is maintained or improved  Outcome: Progressing   Patient is mobile and skin remains intact  Problem: Fall Risk  Goal: Patient will remain free from falls  Outcome: Progressing   She is able to mobilize as a HH assist, she remains free from falls  Problem: Pain - Standard  Goal: Alleviation of pain or a reduction in pain to the patient’s comfort goal  Outcome: Progressing   No complaints of pain, will continue to reassess  Problem: Self Care  Goal: Patient will have the ability to perform ADLs independently or with assistance (bathe, groom, dress, toilet and feed)  Outcome: Progressing   Patient is able to perform ADLs with guidance  Problem: Mobility  Goal: Patient's capacity to carry out activities will improve  Outcome: Progressing   Patient is mobile in all extremities and able to perform ADLs and turn self in bed    Patient is not progressing towards the following goals: N/A

## 2023-03-30 NOTE — DISCHARGE SUMMARY
Discharge Summary    CHIEF COMPLAINT ON ADMISSION  Chief Complaint   Patient presents with    Mental Status Change     Pt's  last saw her 2 weeks ago, today found her to be more confused than usual, putting strange objects in the freezer, etc. No unilateral neuro deficits, but pt not aware of the year or month. .Pt repetitive. Denies pain, no trauma noted.       Reason for Admission  EMS     Admission Date  3/24/2023    CODE STATUS  Full Code    HPI & HOSPITAL COURSE  78 yo F with PMHx of dementia, DM, CVA, HTN, anxiety and depression who lives alone who was found by EMS with confusion. She was hypertensive to 243/107. CTH was unremarkable with nonfocal neuro exam. She was admitted for encephalopathy and hypertensive urgency. Vit B12, TSH, alcohol, ammonia, UA, UDS, syphilis wnl. TTE showed EF 75%, mild LA dilation. She developed SCOTT and her HTN medications were adjusted. Discussed with family and she will need a group home. Arrangements for GH have been made and her quantiferon resulted negative.       Therefore, she is discharged in fair and stable condition to home with close outpatient follow-up.    The patient met 2-midnight criteria for an inpatient stay at the time of discharge.    Discharge Date  3/30/23    FOLLOW UP ITEMS POST DISCHARGE  F/u with PCP for BP management    DISCHARGE DIAGNOSES  Principal Problem:    Encephalopathy POA: Yes  Active Problems:    Hypertensive urgency POA: Yes    H/O: CVA (cerebrovascular accident) POA: Yes    Dyslipidemia POA: Yes    Depression POA: Yes    Type 2 diabetes mellitus (HCC) POA: Yes    SCOTT (acute kidney injury) (HCC) POA: Unknown    Anemia POA: Unknown    Murmur, cardiac POA: Unknown  Resolved Problems:    Nausea & vomiting POA: Unknown      FOLLOW UP  No future appointments.  No follow-up provider specified.    MEDICATIONS ON DISCHARGE     Medication List        START taking these medications        Instructions   senna-docusate 8.6-50 MG  Tabs  Commonly known as: PERICOLACE or SENOKOT S   Take 2 Tablets by mouth 2 times a day.  Dose: 2 Tablet            CHANGE how you take these medications        Instructions   metoprolol SR 50 MG Tb24  What changed:   medication strength  how much to take  when to take this  Commonly known as: TOPROL XL   Take 1 Tablet by mouth every evening.  Dose: 50 mg            CONTINUE taking these medications        Instructions   amLODIPine 10 MG Tabs  Start taking on: March 31, 2023  Commonly known as: NORVASC   Take 1 Tablet by mouth every day.  Dose: 10 mg     aspirin 81 MG EC tablet   Take 1 Tablet by mouth every day.  Dose: 81 mg     atorvastatin 40 MG Tabs  Commonly known as: LIPITOR   Take 1 Tablet by mouth every evening.  Dose: 40 mg     famotidine 20 MG Tabs  Commonly known as: PEPCID   Take 1 Tablet by mouth 1 time a day as needed (heartburn).  Dose: 20 mg     lisinopril 40 MG tablet  Commonly known as: PRINIVIL   Take 1 Tablet by mouth every day.  Dose: 40 mg     sertraline 25 MG tablet  Commonly known as: Zoloft   Take 1 Tablet by mouth every day.  Dose: 25 mg              Allergies  No Known Allergies    DIET  Orders Placed This Encounter   Procedures    Diet Order Diet: Regular     Standing Status:   Standing     Number of Occurrences:   1     Order Specific Question:   Diet:     Answer:   Regular [1]       ACTIVITY  As tolerated.  Weight bearing as tolerated    CONSULTATIONS  none    PROCEDURES  none    LABORATORY  Lab Results   Component Value Date    SODIUM 137 03/29/2023    POTASSIUM 4.2 03/29/2023    CHLORIDE 104 03/29/2023    CO2 23 03/29/2023    GLUCOSE 108 (H) 03/29/2023    BUN 42 (H) 03/29/2023    CREATININE 0.95 03/29/2023    CREATININE 0.8 07/19/2005        Lab Results   Component Value Date    WBC 7.1 03/29/2023    HEMOGLOBIN 7.6 (L) 03/29/2023    HEMATOCRIT 27.3 (L) 03/29/2023    PLATELETCT 193 03/29/2023        Total time of the discharge process exceeds 34 minutes.

## 2023-03-30 NOTE — THERAPY
Occupational Therapy  Daily Treatment     Patient Name: Tania Madison  Age:  77 y.o., Sex:  female  Medical Record #: [id number]  Today's Date: 3/30/2023       Precautions: Fall Risk    Assessment    Pt seen for OT tx. Continues to be grossly limited by cognitive deficits impacting ability to complete ADLs and ADL transfers independently. Pt pleasant and cooperative throughout. Able to complete ADLs w/ setup and SBA-CGA for safety.    Plan    Treatment Plan Status: Continue Current Treatment Plan    DC Equipment Recommendations: Unable to determine at this time  Discharge Recommendations: Recommend home health for continued occupational therapy services. Pt would benefit from supportive living environment d/t cognitive deficits impacting safety and insight.        03/30/23 0801   Cognition    Cognition / Consciousness X   Safety Awareness Impaired   New Learning Impaired   Comments pleasant and cooperative, hx of dementia   Balance   Sitting Balance (Static) Fair +   Sitting Balance (Dynamic) Fair   Standing Balance (Static) Fair   Standing Balance (Dynamic) Fair -   Weight Shift Sitting Fair   Weight Shift Standing Fair   Bed Mobility    Supine to Sit Supervised   Sit to Supine Supervised   Activities of Daily Living   Grooming Contact Guard Assist;Standing   Upper Body Dressing Supervision   Lower Body Dressing Supervision   Toileting Minimal Assist   Functional Mobility   Sit to Stand Supervised   Bed, Chair, Wheelchair Transfer Contact Guard Assist   Toilet Transfers Contact Guard Assist   Short Term Goals   Short Term Goal # 1 Pt will demo toilet hygiene w/ SPV   Goal Outcome # 1 Progressing as expected   Short Term Goal # 2 Pt will demo toilet txf w/ SPV   Goal Outcome # 2 Progressing as expected   Anticipated Discharge Equipment and Recommendations   DC Equipment Recommendations Unable to determine at this time   Discharge Recommendations Recommend home health for continued occupational therapy services

## 2023-04-17 PROBLEM — G47.00 INSOMNIA: Status: ACTIVE | Noted: 2023-04-17

## 2023-05-30 PROBLEM — R21 RASH AND OTHER NONSPECIFIC SKIN ERUPTION: Status: RESOLVED | Noted: 2019-02-24 | Resolved: 2023-05-30

## 2023-07-14 ENCOUNTER — HOSPITAL ENCOUNTER (OUTPATIENT)
Dept: LAB | Facility: MEDICAL CENTER | Age: 78
End: 2023-07-14
Payer: MEDICARE

## 2023-07-14 DIAGNOSIS — E11.9 TYPE 2 DIABETES MELLITUS WITHOUT COMPLICATION, WITHOUT LONG-TERM CURRENT USE OF INSULIN (HCC): ICD-10-CM

## 2023-07-14 DIAGNOSIS — D50.8 OTHER IRON DEFICIENCY ANEMIA: ICD-10-CM

## 2023-07-14 DIAGNOSIS — Z11.59 ENCOUNTER FOR HEPATITIS C SCREENING TEST FOR LOW RISK PATIENT: ICD-10-CM

## 2023-07-14 DIAGNOSIS — E78.5 DYSLIPIDEMIA: ICD-10-CM

## 2023-07-14 DIAGNOSIS — E55.9 VITAMIN D DEFICIENCY: ICD-10-CM

## 2023-07-14 LAB
25(OH)D3 SERPL-MCNC: 17 NG/ML (ref 30–100)
ALBUMIN SERPL BCP-MCNC: 4.6 G/DL (ref 3.2–4.9)
ALBUMIN/GLOB SERPL: 1.4 G/DL
ALP SERPL-CCNC: 82 U/L (ref 30–99)
ALT SERPL-CCNC: 46 U/L (ref 2–50)
ANION GAP SERPL CALC-SCNC: 12 MMOL/L (ref 7–16)
AST SERPL-CCNC: 32 U/L (ref 12–45)
BASOPHILS # BLD AUTO: 0.8 % (ref 0–1.8)
BASOPHILS # BLD: 0.05 K/UL (ref 0–0.12)
BILIRUB SERPL-MCNC: 0.7 MG/DL (ref 0.1–1.5)
BUN SERPL-MCNC: 29 MG/DL (ref 8–22)
CALCIUM ALBUM COR SERPL-MCNC: 9.3 MG/DL (ref 8.5–10.5)
CALCIUM SERPL-MCNC: 9.8 MG/DL (ref 8.5–10.5)
CHLORIDE SERPL-SCNC: 101 MMOL/L (ref 96–112)
CHOLEST SERPL-MCNC: 193 MG/DL (ref 100–199)
CO2 SERPL-SCNC: 25 MMOL/L (ref 20–33)
CREAT SERPL-MCNC: 1.08 MG/DL (ref 0.5–1.4)
EOSINOPHIL # BLD AUTO: 0.18 K/UL (ref 0–0.51)
EOSINOPHIL NFR BLD: 3.1 % (ref 0–6.9)
ERYTHROCYTE [DISTWIDTH] IN BLOOD BY AUTOMATED COUNT: 48 FL (ref 35.9–50)
EST. AVERAGE GLUCOSE BLD GHB EST-MCNC: 154 MG/DL
FASTING STATUS PATIENT QL REPORTED: NORMAL
FERRITIN SERPL-MCNC: 310 NG/ML (ref 10–291)
GFR SERPLBLD CREATININE-BSD FMLA CKD-EPI: 53 ML/MIN/1.73 M 2
GLOBULIN SER CALC-MCNC: 3.3 G/DL (ref 1.9–3.5)
GLUCOSE SERPL-MCNC: 118 MG/DL (ref 65–99)
HBA1C MFR BLD: 7 % (ref 4–5.6)
HCT VFR BLD AUTO: 42.2 % (ref 37–47)
HCV AB SER QL: NORMAL
HDLC SERPL-MCNC: 77 MG/DL
HGB BLD-MCNC: 13.4 G/DL (ref 12–16)
IMM GRANULOCYTES # BLD AUTO: 0.01 K/UL (ref 0–0.11)
IMM GRANULOCYTES NFR BLD AUTO: 0.2 % (ref 0–0.9)
IRON SATN MFR SERPL: 31 % (ref 15–55)
IRON SERPL-MCNC: 120 UG/DL (ref 40–170)
LDLC SERPL CALC-MCNC: 87 MG/DL
LYMPHOCYTES # BLD AUTO: 2.02 K/UL (ref 1–4.8)
LYMPHOCYTES NFR BLD: 34.2 % (ref 22–41)
MCH RBC QN AUTO: 28.9 PG (ref 27–33)
MCHC RBC AUTO-ENTMCNC: 31.8 G/DL (ref 32.2–35.5)
MCV RBC AUTO: 90.9 FL (ref 81.4–97.8)
MONOCYTES # BLD AUTO: 0.34 K/UL (ref 0–0.85)
MONOCYTES NFR BLD AUTO: 5.8 % (ref 0–13.4)
NEUTROPHILS # BLD AUTO: 3.3 K/UL (ref 1.82–7.42)
NEUTROPHILS NFR BLD: 55.9 % (ref 44–72)
NRBC # BLD AUTO: 0 K/UL
NRBC BLD-RTO: 0 /100 WBC (ref 0–0.2)
PLATELET # BLD AUTO: 198 K/UL (ref 164–446)
PMV BLD AUTO: 10.5 FL (ref 9–12.9)
POTASSIUM SERPL-SCNC: 4.6 MMOL/L (ref 3.6–5.5)
PROT SERPL-MCNC: 7.9 G/DL (ref 6–8.2)
RBC # BLD AUTO: 4.64 M/UL (ref 4.2–5.4)
SODIUM SERPL-SCNC: 138 MMOL/L (ref 135–145)
TIBC SERPL-MCNC: 386 UG/DL (ref 250–450)
TRIGL SERPL-MCNC: 146 MG/DL (ref 0–149)
UIBC SERPL-MCNC: 266 UG/DL (ref 110–370)
WBC # BLD AUTO: 5.9 K/UL (ref 4.8–10.8)

## 2023-07-14 PROCEDURE — 36415 COLL VENOUS BLD VENIPUNCTURE: CPT | Mod: GA

## 2023-07-14 PROCEDURE — 83036 HEMOGLOBIN GLYCOSYLATED A1C: CPT | Mod: GA

## 2023-07-14 PROCEDURE — 80053 COMPREHEN METABOLIC PANEL: CPT

## 2023-07-14 PROCEDURE — 83550 IRON BINDING TEST: CPT

## 2023-07-14 PROCEDURE — 85025 COMPLETE CBC W/AUTO DIFF WBC: CPT

## 2023-07-14 PROCEDURE — 80061 LIPID PANEL: CPT

## 2023-07-14 PROCEDURE — 86803 HEPATITIS C AB TEST: CPT

## 2023-07-14 PROCEDURE — 82570 ASSAY OF URINE CREATININE: CPT

## 2023-07-14 PROCEDURE — 83540 ASSAY OF IRON: CPT

## 2023-07-14 PROCEDURE — 82306 VITAMIN D 25 HYDROXY: CPT

## 2023-07-14 PROCEDURE — 82728 ASSAY OF FERRITIN: CPT

## 2023-07-14 PROCEDURE — 82043 UR ALBUMIN QUANTITATIVE: CPT

## 2023-07-15 LAB
CREAT UR-MCNC: 79.6 MG/DL
MICROALBUMIN UR-MCNC: 13.4 MG/DL
MICROALBUMIN/CREAT UR: 168 MG/G (ref 0–30)

## 2023-07-18 PROBLEM — N18.31 STAGE 3A CHRONIC KIDNEY DISEASE: Status: ACTIVE | Noted: 2023-07-18

## 2023-07-18 PROBLEM — R00.1 BRADYCARDIA: Status: ACTIVE | Noted: 2023-07-18

## 2023-07-18 PROBLEM — E11.29 MICROALBUMINURIA DUE TO TYPE 2 DIABETES MELLITUS (HCC): Status: ACTIVE | Noted: 2023-07-18

## 2023-07-18 PROBLEM — E55.9 VITAMIN D DEFICIENCY: Status: ACTIVE | Noted: 2023-07-18

## 2023-07-18 PROBLEM — R80.9 MICROALBUMINURIA DUE TO TYPE 2 DIABETES MELLITUS (HCC): Status: ACTIVE | Noted: 2023-07-18

## 2023-07-18 PROBLEM — D63.8 ANEMIA OF CHRONIC DISEASE: Status: ACTIVE | Noted: 2023-03-24

## 2023-09-05 PROBLEM — M25.569 RECURRENT KNEE PAIN: Status: ACTIVE | Noted: 2023-09-05

## 2023-09-05 PROBLEM — R42 DIZZINESS: Status: ACTIVE | Noted: 2023-09-05

## 2023-09-05 PROBLEM — R51.9 HEADACHE: Status: ACTIVE | Noted: 2023-09-05

## 2023-09-05 PROBLEM — N17.9 AKI (ACUTE KIDNEY INJURY) (HCC): Status: RESOLVED | Noted: 2019-02-25 | Resolved: 2023-09-05

## 2023-09-05 PROBLEM — D63.8 ANEMIA OF CHRONIC DISEASE: Status: RESOLVED | Noted: 2023-03-24 | Resolved: 2023-09-05

## 2023-09-21 PROBLEM — R41.89 COGNITIVE IMPAIRMENT: Status: ACTIVE | Noted: 2023-09-21

## 2023-10-16 PROBLEM — M79.606 LEG PAIN: Status: ACTIVE | Noted: 2023-10-16

## 2024-01-16 PROBLEM — R42 DIZZINESS: Status: RESOLVED | Noted: 2023-09-05 | Resolved: 2024-01-16

## 2024-01-16 PROBLEM — M79.606 LEG PAIN: Status: RESOLVED | Noted: 2023-10-16 | Resolved: 2024-01-16

## 2024-01-16 PROBLEM — R41.82 AMS (ALTERED MENTAL STATUS): Status: RESOLVED | Noted: 2023-03-24 | Resolved: 2024-01-16

## 2024-05-13 PROBLEM — M25.561 ACUTE PAIN OF RIGHT KNEE: Status: ACTIVE | Noted: 2023-09-05

## 2024-05-13 PROBLEM — R63.5 WEIGHT GAIN: Status: ACTIVE | Noted: 2024-05-13

## 2024-05-13 PROBLEM — R00.0 TACHYCARDIA: Status: ACTIVE | Noted: 2023-07-18

## 2024-06-24 PROBLEM — N39.0 UTI (URINARY TRACT INFECTION): Status: ACTIVE | Noted: 2024-06-24

## 2024-06-24 PROBLEM — R74.8 ELEVATED ALKALINE PHOSPHATASE LEVEL: Status: ACTIVE | Noted: 2024-06-24

## 2024-08-05 PROBLEM — N39.0 UTI (URINARY TRACT INFECTION): Status: RESOLVED | Noted: 2024-06-24 | Resolved: 2024-08-05

## 2024-08-05 PROBLEM — R63.5 WEIGHT GAIN: Status: RESOLVED | Noted: 2024-05-13 | Resolved: 2024-08-05

## 2024-09-18 PROBLEM — N18.30 CKD STAGE 3 SECONDARY TO DIABETES (HCC): Status: ACTIVE | Noted: 2023-07-18

## 2024-09-18 PROBLEM — E11.22 CKD STAGE 3 SECONDARY TO DIABETES (HCC): Status: ACTIVE | Noted: 2023-07-18

## 2024-12-05 PROBLEM — R01.1 MURMUR, CARDIAC: Status: RESOLVED | Noted: 2023-03-26 | Resolved: 2024-12-05

## 2025-01-09 PROBLEM — R51.9 HEADACHE: Status: RESOLVED | Noted: 2023-09-05 | Resolved: 2025-01-09

## 2025-03-27 ENCOUNTER — HOSPITAL ENCOUNTER (OUTPATIENT)
Facility: MEDICAL CENTER | Age: 80
End: 2025-03-27
Attending: NURSE PRACTITIONER
Payer: MEDICARE

## 2025-03-27 DIAGNOSIS — E11.9 TYPE 2 DIABETES MELLITUS WITHOUT COMPLICATION, WITHOUT LONG-TERM CURRENT USE OF INSULIN (HCC): ICD-10-CM

## 2025-03-27 DIAGNOSIS — R74.8 ELEVATED ALKALINE PHOSPHATASE LEVEL: ICD-10-CM

## 2025-03-27 DIAGNOSIS — Z20.1 EXPOSURE TO TB: ICD-10-CM

## 2025-03-27 DIAGNOSIS — E78.5 DYSLIPIDEMIA: ICD-10-CM

## 2025-03-27 DIAGNOSIS — E11.22 CKD STAGE 3 SECONDARY TO DIABETES (HCC): ICD-10-CM

## 2025-03-27 DIAGNOSIS — N18.30 CKD STAGE 3 SECONDARY TO DIABETES (HCC): ICD-10-CM

## 2025-03-27 DIAGNOSIS — R00.0 TACHYCARDIA: ICD-10-CM

## 2025-03-27 DIAGNOSIS — E55.9 VITAMIN D DEFICIENCY: ICD-10-CM

## 2025-03-27 LAB
25(OH)D3 SERPL-MCNC: 40 NG/ML (ref 30–100)
ALBUMIN SERPL BCP-MCNC: 4.7 G/DL (ref 3.2–4.9)
ALBUMIN/GLOB SERPL: 1.4 G/DL
ALP SERPL-CCNC: 135 U/L (ref 30–99)
ALT SERPL-CCNC: 59 U/L (ref 2–50)
ANION GAP SERPL CALC-SCNC: 14 MMOL/L (ref 7–16)
AST SERPL-CCNC: 50 U/L (ref 12–45)
BILIRUB SERPL-MCNC: 0.5 MG/DL (ref 0.1–1.5)
BUN SERPL-MCNC: 26 MG/DL (ref 8–22)
CALCIUM ALBUM COR SERPL-MCNC: 8.8 MG/DL (ref 8.5–10.5)
CALCIUM SERPL-MCNC: 9.4 MG/DL (ref 8.5–10.5)
CHLORIDE SERPL-SCNC: 101 MMOL/L (ref 96–112)
CHOLEST SERPL-MCNC: 211 MG/DL (ref 100–199)
CO2 SERPL-SCNC: 20 MMOL/L (ref 20–33)
CREAT SERPL-MCNC: 1.27 MG/DL (ref 0.5–1.4)
EST. AVERAGE GLUCOSE BLD GHB EST-MCNC: 151 MG/DL
GFR SERPLBLD CREATININE-BSD FMLA CKD-EPI: 43 ML/MIN/1.73 M 2
GLOBULIN SER CALC-MCNC: 3.3 G/DL (ref 1.9–3.5)
GLUCOSE SERPL-MCNC: 75 MG/DL (ref 65–99)
HBA1C MFR BLD: 6.9 % (ref 4–5.6)
HDLC SERPL-MCNC: 62 MG/DL
LDLC SERPL CALC-MCNC: 84 MG/DL
POTASSIUM SERPL-SCNC: 4.6 MMOL/L (ref 3.6–5.5)
PROT SERPL-MCNC: 8 G/DL (ref 6–8.2)
SODIUM SERPL-SCNC: 135 MMOL/L (ref 135–145)
TRIGL SERPL-MCNC: 324 MG/DL (ref 0–149)
TSH SERPL-ACNC: 1.6 UIU/ML (ref 0.35–5.5)

## 2025-03-27 PROCEDURE — 82306 VITAMIN D 25 HYDROXY: CPT

## 2025-03-27 PROCEDURE — 83036 HEMOGLOBIN GLYCOSYLATED A1C: CPT

## 2025-03-27 PROCEDURE — 86480 TB TEST CELL IMMUN MEASURE: CPT

## 2025-03-27 PROCEDURE — 84443 ASSAY THYROID STIM HORMONE: CPT

## 2025-03-27 PROCEDURE — 80061 LIPID PANEL: CPT

## 2025-03-27 PROCEDURE — 80053 COMPREHEN METABOLIC PANEL: CPT

## 2025-03-29 LAB
GAMMA INTERFERON BACKGROUND BLD IA-ACNC: 0.61 IU/ML
M TB IFN-G BLD-IMP: NEGATIVE
M TB IFN-G CD4+ BCKGRND COR BLD-ACNC: 0.15 IU/ML
MITOGEN IGNF BCKGRD COR BLD-ACNC: 7.75 IU/ML
QFT TB2 - NIL TBQ2: 0.15 IU/ML

## 2025-05-15 PROBLEM — G89.29 CHRONIC PAIN OF RIGHT KNEE: Status: ACTIVE | Noted: 2023-09-05

## 2025-06-09 PROBLEM — R25.2 MUSCLE CRAMP: Status: ACTIVE | Noted: 2025-06-09

## 2025-07-28 ENCOUNTER — HOSPITAL ENCOUNTER (OUTPATIENT)
Facility: MEDICAL CENTER | Age: 80
End: 2025-07-28
Payer: MEDICARE

## 2025-07-28 DIAGNOSIS — R74.8 ELEVATED ALKALINE PHOSPHATASE LEVEL: ICD-10-CM

## 2025-07-28 DIAGNOSIS — E78.5 DYSLIPIDEMIA: ICD-10-CM

## 2025-07-28 DIAGNOSIS — N18.30 CKD STAGE 3 SECONDARY TO DIABETES (HCC): ICD-10-CM

## 2025-07-28 DIAGNOSIS — G89.29 CHRONIC PAIN OF RIGHT KNEE: ICD-10-CM

## 2025-07-28 DIAGNOSIS — E11.22 CKD STAGE 3 SECONDARY TO DIABETES (HCC): ICD-10-CM

## 2025-07-28 DIAGNOSIS — M25.561 CHRONIC PAIN OF RIGHT KNEE: ICD-10-CM

## 2025-07-28 LAB
ALBUMIN SERPL BCP-MCNC: 4.9 G/DL (ref 3.2–4.9)
ALBUMIN/GLOB SERPL: 1.6 G/DL
ALP SERPL-CCNC: 115 U/L (ref 30–99)
ALT SERPL-CCNC: 47 U/L (ref 2–50)
ANION GAP SERPL CALC-SCNC: 15 MMOL/L (ref 7–16)
AST SERPL-CCNC: 43 U/L (ref 12–45)
BILIRUB SERPL-MCNC: 0.5 MG/DL (ref 0.1–1.5)
BUN SERPL-MCNC: 23 MG/DL (ref 8–22)
CALCIUM ALBUM COR SERPL-MCNC: 9 MG/DL (ref 8.5–10.5)
CALCIUM SERPL-MCNC: 9.7 MG/DL (ref 8.5–10.5)
CHLORIDE SERPL-SCNC: 104 MMOL/L (ref 96–112)
CHOLEST SERPL-MCNC: 243 MG/DL (ref 100–199)
CO2 SERPL-SCNC: 21 MMOL/L (ref 20–33)
CREAT SERPL-MCNC: 1.17 MG/DL (ref 0.5–1.4)
ERYTHROCYTE [DISTWIDTH] IN BLOOD BY AUTOMATED COUNT: 46.7 FL (ref 35.9–50)
GFR SERPLBLD CREATININE-BSD FMLA CKD-EPI: 48 ML/MIN/1.73 M 2
GLOBULIN SER CALC-MCNC: 3.1 G/DL (ref 1.9–3.5)
GLUCOSE SERPL-MCNC: 102 MG/DL (ref 65–99)
HCT VFR BLD AUTO: 42.9 % (ref 37–47)
HDLC SERPL-MCNC: 63 MG/DL
HGB BLD-MCNC: 13.4 G/DL (ref 12–16)
LDLC SERPL CALC-MCNC: 101 MG/DL
MAGNESIUM SERPL-MCNC: 2.5 MG/DL (ref 1.5–2.5)
MCH RBC QN AUTO: 30.1 PG (ref 27–33)
MCHC RBC AUTO-ENTMCNC: 31.2 G/DL (ref 32.2–35.5)
MCV RBC AUTO: 96.4 FL (ref 81.4–97.8)
PHOSPHATE SERPL-MCNC: 2.9 MG/DL (ref 2.5–4.5)
PLATELET # BLD AUTO: 231 K/UL (ref 164–446)
PMV BLD AUTO: 10.2 FL (ref 9–12.9)
POTASSIUM SERPL-SCNC: 4.5 MMOL/L (ref 3.6–5.5)
PROT SERPL-MCNC: 8 G/DL (ref 6–8.2)
RBC # BLD AUTO: 4.45 M/UL (ref 4.2–5.4)
SODIUM SERPL-SCNC: 140 MMOL/L (ref 135–145)
TRIGL SERPL-MCNC: 395 MG/DL (ref 0–149)
WBC # BLD AUTO: 8.2 K/UL (ref 4.8–10.8)

## 2025-07-28 PROCEDURE — 84100 ASSAY OF PHOSPHORUS: CPT

## 2025-07-28 PROCEDURE — 83735 ASSAY OF MAGNESIUM: CPT

## 2025-07-28 PROCEDURE — 85027 COMPLETE CBC AUTOMATED: CPT

## 2025-07-28 PROCEDURE — 80061 LIPID PANEL: CPT

## 2025-07-28 PROCEDURE — 80053 COMPREHEN METABOLIC PANEL: CPT
